# Patient Record
Sex: MALE | Race: WHITE | Employment: FULL TIME | URBAN - METROPOLITAN AREA
[De-identification: names, ages, dates, MRNs, and addresses within clinical notes are randomized per-mention and may not be internally consistent; named-entity substitution may affect disease eponyms.]

---

## 2017-02-06 ENCOUNTER — OFFICE VISIT (OUTPATIENT)
Dept: ORTHOPEDIC SURGERY | Age: 67
End: 2017-02-06

## 2017-02-06 ENCOUNTER — HOSPITAL ENCOUNTER (OUTPATIENT)
Dept: GENERAL RADIOLOGY | Facility: MEDICAL CENTER | Age: 67
Discharge: HOME | End: 2017-02-06
Attending: ORTHOPAEDIC SURGERY | Admitting: ORTHOPAEDIC SURGERY

## 2017-02-06 VITALS
SYSTOLIC BLOOD PRESSURE: 125 MMHG | HEART RATE: 65 BPM | WEIGHT: 221 LBS | HEIGHT: 73 IN | DIASTOLIC BLOOD PRESSURE: 76 MMHG | BODY MASS INDEX: 29.29 KG/M2

## 2017-02-06 DIAGNOSIS — Z98.890 STATUS POST ROTATOR CUFF SURGERY: ICD-10-CM

## 2017-02-06 DIAGNOSIS — G89.29 CHRONIC RIGHT SHOULDER PAIN: ICD-10-CM

## 2017-02-06 DIAGNOSIS — M54.2 NECK PAIN: ICD-10-CM

## 2017-02-06 DIAGNOSIS — S46.811A STRAIN OF DELTOID MUSCLE, RIGHT, INITIAL ENCOUNTER: ICD-10-CM

## 2017-02-06 DIAGNOSIS — M75.21 BICEPS TENDINITIS OF RIGHT SHOULDER: ICD-10-CM

## 2017-02-06 DIAGNOSIS — M25.511 CHRONIC RIGHT SHOULDER PAIN: ICD-10-CM

## 2017-02-06 DIAGNOSIS — G89.29 CHRONIC RIGHT SHOULDER PAIN: Primary | ICD-10-CM

## 2017-02-06 DIAGNOSIS — M25.511 CHRONIC RIGHT SHOULDER PAIN: Primary | ICD-10-CM

## 2017-02-06 DIAGNOSIS — Z98.890 S/P SHOULDER SURGERY: ICD-10-CM

## 2017-02-06 DIAGNOSIS — M75.81 ROTATOR CUFF TENDINITIS, RIGHT: ICD-10-CM

## 2017-02-06 PROCEDURE — 99214 OFFICE O/P EST MOD 30 MIN: CPT | Performed by: PHYSICIAN ASSISTANT

## 2017-02-06 PROCEDURE — 73030 X-RAY EXAM OF SHOULDER: CPT | Performed by: PHYSICIAN ASSISTANT

## 2017-02-06 RX ORDER — HYDROCODONE BITARTRATE AND ACETAMINOPHEN 5; 325 MG/1; MG/1
1 TABLET ORAL EVERY 6 HOURS PRN
COMMUNITY
End: 2017-05-10 | Stop reason: HOSPADM

## 2017-02-06 RX ORDER — MONTELUKAST SODIUM 10 MG/1
10 TABLET ORAL NIGHTLY
COMMUNITY

## 2017-02-13 ENCOUNTER — OFFICE VISIT (OUTPATIENT)
Dept: ORTHOPEDIC SURGERY | Age: 67
End: 2017-02-13

## 2017-02-13 ENCOUNTER — TELEPHONE (OUTPATIENT)
Dept: ORTHOPEDIC SURGERY | Age: 67
End: 2017-02-13

## 2017-02-13 VITALS
WEIGHT: 221 LBS | BODY MASS INDEX: 29.29 KG/M2 | HEART RATE: 70 BPM | HEIGHT: 73 IN | SYSTOLIC BLOOD PRESSURE: 121 MMHG | DIASTOLIC BLOOD PRESSURE: 72 MMHG

## 2017-02-13 DIAGNOSIS — M75.81 TENDINITIS OF RIGHT ROTATOR CUFF: ICD-10-CM

## 2017-02-13 DIAGNOSIS — Z98.890 STATUS POST SURGERY: ICD-10-CM

## 2017-02-13 DIAGNOSIS — M75.21 BICEPS TENDINITIS OF RIGHT SHOULDER: ICD-10-CM

## 2017-02-13 DIAGNOSIS — Z98.890 S/P SHOULDER SURGERY: ICD-10-CM

## 2017-02-13 DIAGNOSIS — Z98.890 STATUS POST ROTATOR CUFF SURGERY: ICD-10-CM

## 2017-02-13 DIAGNOSIS — S46.811A STRAIN OF RIGHT DELTOID MUSCLE, INITIAL ENCOUNTER: ICD-10-CM

## 2017-02-13 DIAGNOSIS — G89.29 CHRONIC RIGHT SHOULDER PAIN: Primary | ICD-10-CM

## 2017-02-13 DIAGNOSIS — M75.121 COMPLETE TEAR OF RIGHT ROTATOR CUFF: ICD-10-CM

## 2017-02-13 DIAGNOSIS — M25.511 CHRONIC RIGHT SHOULDER PAIN: Primary | ICD-10-CM

## 2017-02-13 PROBLEM — M75.80 ROTATOR CUFF TENDINITIS: Status: ACTIVE | Noted: 2017-02-13

## 2017-02-13 PROBLEM — S46.819A STRAIN OF DELTOID MUSCLE: Status: ACTIVE | Noted: 2017-02-13

## 2017-02-13 PROCEDURE — 99214 OFFICE O/P EST MOD 30 MIN: CPT | Performed by: ORTHOPAEDIC SURGERY

## 2017-02-13 RX ORDER — MELOXICAM 15 MG/1
TABLET ORAL
COMMUNITY
Start: 2017-02-13

## 2017-03-28 ENCOUNTER — TELEPHONE (OUTPATIENT)
Dept: ORTHOPEDIC SURGERY | Age: 67
End: 2017-03-28

## 2017-05-01 ENCOUNTER — TELEPHONE (OUTPATIENT)
Dept: ORTHOPEDIC SURGERY | Age: 67
End: 2017-05-01

## 2017-05-03 ENCOUNTER — TELEPHONE (OUTPATIENT)
Dept: ORTHOPEDIC SURGERY | Age: 67
End: 2017-05-03

## 2017-05-04 ENCOUNTER — HOSPITAL ENCOUNTER (OUTPATIENT)
Dept: PREADMISSION TESTING | Age: 67
Discharge: HOME | End: 2017-05-04
Attending: ORTHOPAEDIC SURGERY | Admitting: ORTHOPAEDIC SURGERY

## 2017-05-04 VITALS — BODY MASS INDEX: 29.16 KG/M2 | WEIGHT: 220 LBS | HEIGHT: 73 IN

## 2017-05-04 RX ORDER — CHLORHEXIDINE GLUCONATE 0.12 MG/ML
15 RINSE ORAL 2 TIMES DAILY
Status: CANCELLED | OUTPATIENT
Start: 2017-05-04

## 2017-05-10 ENCOUNTER — HOSPITAL ENCOUNTER (OUTPATIENT)
Dept: SURGERY | Age: 67
Discharge: HOME | End: 2017-05-10
Admitting: ORTHOPAEDIC SURGERY

## 2017-05-10 VITALS
HEIGHT: 73 IN | OXYGEN SATURATION: 94 % | RESPIRATION RATE: 18 BRPM | WEIGHT: 220 LBS | HEART RATE: 61 BPM | BODY MASS INDEX: 29.16 KG/M2 | TEMPERATURE: 97.9 F | SYSTOLIC BLOOD PRESSURE: 103 MMHG | DIASTOLIC BLOOD PRESSURE: 54 MMHG

## 2017-05-10 PROCEDURE — 93010 ELECTROCARDIOGRAM REPORT: CPT | Performed by: INTERNAL MEDICINE

## 2017-05-10 RX ORDER — ONDANSETRON 2 MG/ML
4 INJECTION INTRAMUSCULAR; INTRAVENOUS
Status: ACTIVE | OUTPATIENT
Start: 2017-05-10 | End: 2017-05-10

## 2017-05-10 RX ORDER — SODIUM CHLORIDE, SODIUM LACTATE, POTASSIUM CHLORIDE, CALCIUM CHLORIDE 600; 310; 30; 20 MG/100ML; MG/100ML; MG/100ML; MG/100ML
INJECTION, SOLUTION INTRAVENOUS CONTINUOUS
Status: DISCONTINUED | OUTPATIENT
Start: 2017-05-10 | End: 2017-05-11 | Stop reason: HOSPADM

## 2017-05-10 RX ORDER — ROPIVACAINE HYDROCHLORIDE 5 MG/ML
30 INJECTION, SOLUTION EPIDURAL; INFILTRATION; PERINEURAL ONCE
Status: DISCONTINUED | OUTPATIENT
Start: 2017-05-10 | End: 2017-05-11 | Stop reason: HOSPADM

## 2017-05-10 RX ORDER — MORPHINE SULFATE 2 MG/ML
1 INJECTION, SOLUTION INTRAMUSCULAR; INTRAVENOUS EVERY 5 MIN PRN
Status: DISCONTINUED | OUTPATIENT
Start: 2017-05-10 | End: 2017-05-11 | Stop reason: HOSPADM

## 2017-05-10 RX ORDER — PROMETHAZINE HYDROCHLORIDE 25 MG/1
25 TABLET ORAL EVERY 8 HOURS PRN
Qty: 30 TABLET | Refills: 0 | Status: SHIPPED | OUTPATIENT
Start: 2017-05-10 | End: 2017-05-17

## 2017-05-10 RX ORDER — CHLORHEXIDINE GLUCONATE 0.12 MG/ML
15 RINSE ORAL 2 TIMES DAILY
Status: DISCONTINUED | OUTPATIENT
Start: 2017-05-10 | End: 2017-05-11 | Stop reason: HOSPADM

## 2017-05-10 RX ORDER — LABETALOL HYDROCHLORIDE 5 MG/ML
5 INJECTION, SOLUTION INTRAVENOUS EVERY 10 MIN PRN
Status: DISCONTINUED | OUTPATIENT
Start: 2017-05-10 | End: 2017-05-11 | Stop reason: HOSPADM

## 2017-05-10 RX ORDER — LIDOCAINE HYDROCHLORIDE 10 MG/ML
INJECTION, SOLUTION INFILTRATION; PERINEURAL
Status: DISCONTINUED
Start: 2017-05-10 | End: 2017-05-10 | Stop reason: WASHOUT

## 2017-05-10 RX ORDER — SODIUM CHLORIDE 0.9 % (FLUSH) 0.9 %
10 SYRINGE (ML) INJECTION PRN
Status: DISCONTINUED | OUTPATIENT
Start: 2017-05-10 | End: 2017-05-11 | Stop reason: HOSPADM

## 2017-05-10 RX ORDER — SODIUM CHLORIDE 0.9 % (FLUSH) 0.9 %
10 SYRINGE (ML) INJECTION EVERY 12 HOURS SCHEDULED
Status: DISCONTINUED | OUTPATIENT
Start: 2017-05-10 | End: 2017-05-11 | Stop reason: HOSPADM

## 2017-05-10 RX ORDER — MIDAZOLAM HYDROCHLORIDE 1 MG/ML
4 INJECTION INTRAMUSCULAR; INTRAVENOUS
Status: COMPLETED | OUTPATIENT
Start: 2017-05-10 | End: 2017-05-10

## 2017-05-10 RX ORDER — OXYCODONE HYDROCHLORIDE AND ACETAMINOPHEN 5; 325 MG/1; MG/1
2 TABLET ORAL PRN
Status: ACTIVE | OUTPATIENT
Start: 2017-05-10 | End: 2017-05-10

## 2017-05-10 RX ORDER — FENTANYL CITRATE 50 UG/ML
100 INJECTION, SOLUTION INTRAMUSCULAR; INTRAVENOUS ONCE
Status: DISCONTINUED | OUTPATIENT
Start: 2017-05-10 | End: 2017-05-11 | Stop reason: HOSPADM

## 2017-05-10 RX ORDER — HYDRALAZINE HYDROCHLORIDE 20 MG/ML
5 INJECTION INTRAMUSCULAR; INTRAVENOUS
Status: DISCONTINUED | OUTPATIENT
Start: 2017-05-10 | End: 2017-05-11 | Stop reason: HOSPADM

## 2017-05-10 RX ORDER — OXYCODONE HYDROCHLORIDE AND ACETAMINOPHEN 5; 325 MG/1; MG/1
1 TABLET ORAL EVERY 4 HOURS PRN
Qty: 30 TABLET | Refills: 0 | Status: SHIPPED | OUTPATIENT
Start: 2017-05-10 | End: 2017-05-17

## 2017-05-10 RX ORDER — MEPERIDINE HYDROCHLORIDE 25 MG/ML
12.5 INJECTION INTRAMUSCULAR; INTRAVENOUS; SUBCUTANEOUS EVERY 5 MIN PRN
Status: DISCONTINUED | OUTPATIENT
Start: 2017-05-10 | End: 2017-05-11 | Stop reason: HOSPADM

## 2017-05-10 RX ORDER — GLYCOPYRROLATE 0.2 MG/ML
0.1 INJECTION INTRAMUSCULAR; INTRAVENOUS ONCE
Status: COMPLETED | OUTPATIENT
Start: 2017-05-10 | End: 2017-05-10

## 2017-05-10 RX ORDER — OXYCODONE HYDROCHLORIDE AND ACETAMINOPHEN 5; 325 MG/1; MG/1
1 TABLET ORAL PRN
Status: ACTIVE | OUTPATIENT
Start: 2017-05-10 | End: 2017-05-10

## 2017-05-10 RX ORDER — MORPHINE SULFATE 2 MG/ML
2 INJECTION, SOLUTION INTRAMUSCULAR; INTRAVENOUS EVERY 5 MIN PRN
Status: DISCONTINUED | OUTPATIENT
Start: 2017-05-10 | End: 2017-05-11 | Stop reason: HOSPADM

## 2017-05-10 RX ORDER — LIDOCAINE HYDROCHLORIDE 10 MG/ML
INJECTION, SOLUTION INFILTRATION; PERINEURAL
Status: DISPENSED
Start: 2017-05-10 | End: 2017-05-10

## 2017-05-10 RX ADMIN — CHLORHEXIDINE GLUCONATE 15 ML: 0.12 RINSE ORAL at 13:39

## 2017-05-10 RX ADMIN — CHLORHEXIDINE GLUCONATE 15 ML: 0.12 RINSE ORAL at 08:44

## 2017-05-10 RX ADMIN — MIDAZOLAM HYDROCHLORIDE 2 MG: 1 INJECTION INTRAMUSCULAR; INTRAVENOUS at 09:20

## 2017-05-10 RX ADMIN — GLYCOPYRROLATE 0.1 MG: 0.2 INJECTION INTRAMUSCULAR; INTRAVENOUS at 09:46

## 2017-05-10 RX ADMIN — SODIUM CHLORIDE, SODIUM LACTATE, POTASSIUM CHLORIDE, CALCIUM CHLORIDE: 600; 310; 30; 20 INJECTION, SOLUTION INTRAVENOUS at 08:53

## 2017-05-10 ASSESSMENT — PAIN DESCRIPTION - PAIN TYPE: TYPE: SURGICAL PAIN

## 2017-05-10 ASSESSMENT — ENCOUNTER SYMPTOMS: SHORTNESS OF BREATH: 1

## 2017-05-10 ASSESSMENT — PAIN SCALES - GENERAL
PAINLEVEL_OUTOF10: 0

## 2017-05-10 ASSESSMENT — PAIN - FUNCTIONAL ASSESSMENT: PAIN_FUNCTIONAL_ASSESSMENT: 0-10

## 2017-05-10 ASSESSMENT — PAIN DESCRIPTION - ORIENTATION: ORIENTATION: RIGHT

## 2017-05-10 ASSESSMENT — PAIN DESCRIPTION - LOCATION: LOCATION: SHOULDER

## 2017-05-11 ENCOUNTER — HOSPITAL ENCOUNTER (OUTPATIENT)
Dept: PHYSICAL THERAPY | Age: 67
Discharge: HOME | End: 2017-05-31
Admitting: ORTHOPAEDIC SURGERY

## 2017-05-11 LAB
EKG ATRIAL RATE: 54 BPM
EKG DIAGNOSIS: NORMAL
EKG P AXIS: 36 DEGREES
EKG P-R INTERVAL: 110 MS
EKG Q-T INTERVAL: 462 MS
EKG QRS DURATION: 94 MS
EKG QTC CALCULATION (BAZETT): 438 MS
EKG R AXIS: 19 DEGREES
EKG T AXIS: 17 DEGREES
EKG VENTRICULAR RATE: 54 BPM

## 2017-05-16 DIAGNOSIS — Z98.890 S/P ROTATOR CUFF REPAIR: ICD-10-CM

## 2017-05-19 ENCOUNTER — OFFICE VISIT (OUTPATIENT)
Dept: ORTHOPEDIC SURGERY | Age: 67
End: 2017-05-19

## 2017-05-19 VITALS
HEART RATE: 68 BPM | HEIGHT: 72 IN | DIASTOLIC BLOOD PRESSURE: 72 MMHG | WEIGHT: 220.02 LBS | BODY MASS INDEX: 29.8 KG/M2 | SYSTOLIC BLOOD PRESSURE: 136 MMHG

## 2017-05-19 DIAGNOSIS — Z98.890 S/P ROTATOR CUFF REPAIR: Primary | ICD-10-CM

## 2017-05-19 PROCEDURE — 99024 POSTOP FOLLOW-UP VISIT: CPT | Performed by: ORTHOPAEDIC SURGERY

## 2017-07-14 ENCOUNTER — OFFICE VISIT (OUTPATIENT)
Dept: ORTHOPEDIC SURGERY | Age: 67
End: 2017-07-14

## 2017-07-14 VITALS
SYSTOLIC BLOOD PRESSURE: 129 MMHG | HEART RATE: 69 BPM | WEIGHT: 220.02 LBS | DIASTOLIC BLOOD PRESSURE: 70 MMHG | HEIGHT: 72 IN | BODY MASS INDEX: 29.8 KG/M2

## 2017-07-14 DIAGNOSIS — Z98.890 S/P ROTATOR CUFF REPAIR: Primary | ICD-10-CM

## 2017-07-14 PROCEDURE — 99024 POSTOP FOLLOW-UP VISIT: CPT | Performed by: PHYSICIAN ASSISTANT

## 2017-09-13 ENCOUNTER — HOSPITAL ENCOUNTER (OUTPATIENT)
Dept: PHYSICAL THERAPY | Age: 67
Discharge: HOME | End: 2017-09-14
Admitting: ORTHOPAEDIC SURGERY

## 2017-09-18 ENCOUNTER — HOSPITAL ENCOUNTER (OUTPATIENT)
Dept: PHYSICAL THERAPY | Age: 67
Discharge: HOME | End: 2017-09-19
Admitting: ORTHOPAEDIC SURGERY

## 2017-09-22 ENCOUNTER — OFFICE VISIT (OUTPATIENT)
Dept: ORTHOPEDIC SURGERY | Age: 67
End: 2017-09-22

## 2017-09-22 VITALS
HEIGHT: 72 IN | SYSTOLIC BLOOD PRESSURE: 112 MMHG | HEART RATE: 65 BPM | BODY MASS INDEX: 29.8 KG/M2 | DIASTOLIC BLOOD PRESSURE: 68 MMHG | WEIGHT: 220 LBS

## 2017-09-22 DIAGNOSIS — Z98.890 S/P ROTATOR CUFF REPAIR: ICD-10-CM

## 2017-09-22 DIAGNOSIS — M25.511 CHRONIC RIGHT SHOULDER PAIN: Primary | ICD-10-CM

## 2017-09-22 DIAGNOSIS — G89.29 CHRONIC RIGHT SHOULDER PAIN: Primary | ICD-10-CM

## 2017-09-22 PROCEDURE — 99213 OFFICE O/P EST LOW 20 MIN: CPT | Performed by: PHYSICIAN ASSISTANT

## 2018-05-22 ENCOUNTER — HOSPITAL ENCOUNTER (OUTPATIENT)
Dept: OTHER | Age: 68
Discharge: HOME | End: 2018-05-31
Attending: INTERNAL MEDICINE | Admitting: INTERNAL MEDICINE

## 2018-06-01 ENCOUNTER — HOSPITAL ENCOUNTER (OUTPATIENT)
Dept: PHYSICAL THERAPY | Age: 68
Discharge: HOME | End: 2018-06-02
Admitting: INTERNAL MEDICINE

## 2018-06-01 ENCOUNTER — HOSPITAL ENCOUNTER (OUTPATIENT)
Dept: OTHER | Age: 68
Discharge: HOME | End: 2018-06-30
Attending: INTERNAL MEDICINE | Admitting: INTERNAL MEDICINE

## 2018-06-04 ENCOUNTER — HOSPITAL ENCOUNTER (OUTPATIENT)
Dept: PHYSICAL THERAPY | Age: 68
Discharge: HOME | End: 2018-06-05
Admitting: INTERNAL MEDICINE

## 2018-06-04 NOTE — FLOWSHEET NOTE
Physical Therapy Daily Treatment Note  Date:  2018    Patient Name:  China Meléndez    :  1950  MRN: [id number]  Restrictions/Precautions:  B quad weakness- R 2/5 and L 3/5. 18 had lumbar surgery to attempt to straighten back by connecting prior S1 - L1 fusion to T8- 13 hour surgery. Pt seems to have nerve palsy from laying prone which has caused B quad weakness. MD said she regain strength quickly    Pertinent Medical History: OA, HTN, Restless Leg, SOB, Back Surgery- multiple fusions prior to current one. Cervical Fusion. L TKR. L shoulder surgery. R shoulder surgery    Medical/Treatment Diagnosis Information:  · Diagnosis: B LE weakness  · Treatment Diagnosis: B LE weakness, impaired standing balance and ability to ambulate    Insurance/Certification information:  PT Insurance Information: [employer]. Physician Information:  Referring Practitioner: Dr. Smyth Square of care signed (Y/N):    Visit# / total visits:    Pain level: 0-1/10     G-Code (if applicable):      Date / Visit # G-Code Applied:  18  PT G-Codes  Functional Assessment Tool Used: PT assessment  Functional Limitation: Mobility: Walking and moving around  Mobility: Walking and Moving Around Current Status (): At least 80 percent but less than 100 percent impaired, limited or restricted  Mobility: Walking and Moving Around Goal Status (): At least 1 percent but less than 20 percent impaired, limited or restricted    Progress Note: []  Yes  []  No  Next due by: Visit #10      History of Injury:  Pt states he had lumbar surgery on 18 and 18 to do S1 disectomy and then to correct S1 to L5 fusion by connecting it to T8 to improve posture. Pt was having left leg pain from his knee down that was really bad, leading to the surgery. Currently, 0-1/10 pain in low back, nothing in LE. Pt states he was discharged from [facility] on 18. At that time he was doing transfers independently.  Last day at [facility] he ambulated 50 feet with RW and CGA from PT. Pt states his left leg has been getting stronger, left leg is still weak. Subjective:       5/24/18: States he is doing good   6/1/18: Pt states he is doing good  6/4/18: Pt states he has noticed he can move his R foot a little more when he tries to kick his leg out    Objective:  Observation:   Test measurements:    6/1/18 Knee Extension MMT: R 2/5, L 3+/5    Exercises:  Exercise/Equipment Resistance/Repetitions Other comments   // Bars Stood x 10 min, x 5 min with CGA. Nu Step X 10 min, level 4                                  HEP      Supine Quad and Glut set, bridging  5/22/18   Seated HS STretch  5/22/18                    Other Therapeutic Activities:      Home Exercise Program:    5/24/18: Practiced sit to stand at / bar to mimic standing at home at sink.  Pt to start standing at home with chair behind him and 1 person assist    Manual Treatments:    STM To B quads    Modalities:    Estim (attended): VMS to R quad x 10 min: 10 sec on/ 10 sec off - did quad contraction when on    Timed Code Treatment Minutes:  70    Total Treatment Minutes:  70    Treatment/Activity Tolerance:  [x] Patient tolerated treatment well [] Patient limited by fatigue  [] Patient limited by pain  [] Patient limited by other medical complications  [] Other:     Prognosis: [x] Good [] Fair  [] Poor    Patient Requires Follow-up: [x] Yes  [] No    Plan:   [x] Continue per plan of care [] Alter current plan (see comments)  [] Plan of care initiated [] Hold pending MD visit [] Discharge    Plan for Next Session:    Progress standing tolerance and gait in // bars  Work on quad strengthening with Estim if helping    Electronically signed by:  Silvia Khan, PT

## 2018-06-06 ENCOUNTER — HOSPITAL ENCOUNTER (OUTPATIENT)
Dept: PHYSICAL THERAPY | Age: 68
Discharge: HOME | End: 2018-06-07
Admitting: INTERNAL MEDICINE

## 2018-06-06 NOTE — FLOWSHEET NOTE
Physical Therapy Daily Treatment Note  Date:  2018    Patient Name:  Makenzie Hidalgo    :  1950  MRN: [id number]  Restrictions/Precautions:  B quad weakness- R 2/5 and L 3/5. 18 had lumbar surgery to attempt to straighten back by connecting prior S1 - L1 fusion to T8- 13 hour surgery. Pt seems to have nerve palsy from laying prone which has caused B quad weakness. MD said she regain strength quickly    Pertinent Medical History: OA, HTN, Restless Leg, SOB, Back Surgery- multiple fusions prior to current one. Cervical Fusion. L TKR. L shoulder surgery. R shoulder surgery    Medical/Treatment Diagnosis Information:  · Diagnosis: B LE weakness  · Treatment Diagnosis: B LE weakness, impaired standing balance and ability to ambulate    Insurance/Certification information:  PT Insurance Information[facility] Physician Information:  Referring Practitioner: Dr. Orestes Rodriguez of care signed (Y/N):    Visit# / total visits:     Pain level: 0-1/10     G-Code (if applicable):      Date / Visit # G-Code Applied:  18  PT G-Codes  Functional Assessment Tool Used: PT assessment  Functional Limitation: Mobility: Walking and moving around  Mobility: Walking and Moving Around Current Status (): At least 80 percent but less than 100 percent impaired, limited or restricted  Mobility: Walking and Moving Around Goal Status (): At least 1 percent but less than 20 percent impaired, limited or restricted    Progress Note: []  Yes  []  No  Next due by: Visit #10      History of Injury:  Pt states he had lumbar surgery on 18 and 18 to do S1 disectomy and then to correct S1 to L5 fusion by connecting it to T8 to improve posture. Pt was having left leg pain from his knee down that was really bad, leading to the surgery. Currently, 0-1/10 pain in low back, nothing in LE. Pt states he was discharged from [facility] on 18. At that time he was doing transfers independently.  Last

## 2018-06-11 ENCOUNTER — HOSPITAL ENCOUNTER (OUTPATIENT)
Dept: PHYSICAL THERAPY | Age: 68
Discharge: HOME | End: 2018-06-12
Admitting: INTERNAL MEDICINE

## 2018-06-11 NOTE — FLOWSHEET NOTE
Physical Therapy Daily Treatment Note  Date:  2018    Patient Name:  Tom Lees    :  1950  MRN: [id number]  Restrictions/Precautions:  B quad weakness- R 2/5 and L 3/5. 18 had lumbar surgery to attempt to straighten back by connecting prior S1 - L1 fusion to T8- 13 hour surgery. Pt seems to have nerve palsy from laying prone which has caused B quad weakness. MD said she regain strength quickly    Pertinent Medical History: OA, HTN, Restless Leg, SOB, Back Surgery- multiple fusions prior to current one. Cervical Fusion. L TKR. L shoulder surgery. R shoulder surgery    Medical/Treatment Diagnosis Information:  · Diagnosis: B LE weakness  · Treatment Diagnosis: B LE weakness, impaired standing balance and ability to ambulate    Insurance/Certification information:  PT Insurance Information: [email or web address]. Physician Information:  Referring Practitioner: Dr. Esdras Bhat of care signed (Y/N):    Visit# / total visits:     Pain level: 0-1/10     G-Code (if applicable):      Date / Visit # G-Code Applied:  18  PT G-Codes  Functional Assessment Tool Used: PT assessment  Functional Limitation: Mobility: Walking and moving around  Mobility: Walking and Moving Around Current Status (): At least 80 percent but less than 100 percent impaired, limited or restricted  Mobility: Walking and Moving Around Goal Status (): At least 1 percent but less than 20 percent impaired, limited or restricted    Progress Note: []  Yes  []  No  Next due by: Visit #10      History of Injury:  Pt states he had lumbar surgery on 18 and 18 to do S1 disectomy and then to correct S1 to L5 fusion by connecting it to T8 to improve posture. Pt was having left leg pain from his knee down that was really bad, leading to the surgery. Currently, 0-1/10 pain in low back, nothing in LE. Pt states he was discharged from [facility] on 18. At that time he was doing transfers independently.  Last day at [facility] he ambulated 50 feet with RW and CGA from PT. Pt states his left leg has been getting stronger, left leg is still weak. Subjective:       5/24/18: States he is doing good   6/1/18: Pt states he is doing good  6/4/18: Pt states he has noticed he can move his R foot a little more when he tries to kick his leg out  6/6/18: Pt states he is doing good  6/11/18: States his R foot is kicking out a little more    Objective:  Observation:   Test measurements:    6/1/18 Knee Extension MMT: R 2/5, L 3+/5    Exercises:  Exercise/Equipment Resistance/Repetitions Other comments   // Bars Stood x 10 min, x 5 min with CGA. Nu Step X 10 min, level 3    Seated LAQ X 10 B with assist- did eccentric on left                             HEP      Supine Quad and Glut set, bridging  5/22/18   Seated HS STretch  5/22/18                    Other Therapeutic Activities:      Home Exercise Program:    5/24/18: Practiced sit to stand at / bar to mimic standing at home at [facility].  Pt to start standing at home with chair behind him and 1 person assist    Manual Treatments:        Modalities:    Estim (attended): VMS to R quad x 10 min: 10 sec on/ 10 sec off - did quad contraction when on    Timed Code Treatment Minutes:  57    Total Treatment Minutes:  65    Treatment/Activity Tolerance:  [x] Patient tolerated treatment well [] Patient limited by fatigue  [] Patient limited by pain  [] Patient limited by other medical complications  [] Other:     Prognosis: [x] Good [] Fair  [] Poor    Patient Requires Follow-up: [x] Yes  [] No    Plan:   [x] Continue per plan of care [] Alter current plan (see comments)  [] Plan of care initiated [] Hold pending MD visit [] Discharge    Plan for Next Session:    Progress standing tolerance and gait in // bars  Work on quad strengthening with Estim if helping    Electronically signed by:  Quintin Quintanilla, PT

## 2018-06-13 ENCOUNTER — HOSPITAL ENCOUNTER (OUTPATIENT)
Dept: PHYSICAL THERAPY | Age: 68
Discharge: HOME | End: 2018-06-14
Admitting: INTERNAL MEDICINE

## 2018-06-13 NOTE — FLOWSHEET NOTE
Physical Therapy Daily Treatment Note  Date:  2018    Patient Name:  Colton Childress    :  1950  MRN: [id number]  Restrictions/Precautions:  B quad weakness- R 2/5 and L 3/5. 18 had lumbar surgery to attempt to straighten back by connecting prior S1 - L1 fusion to T8- 13 hour surgery. Pt seems to have nerve palsy from laying prone which has caused B quad weakness. MD said she regain strength quickly    Pertinent Medical History: OA, HTN, Restless Leg, SOB, Back Surgery- multiple fusions prior to current one. Cervical Fusion. L TKR. L shoulder surgery. R shoulder surgery    Medical/Treatment Diagnosis Information:  · Diagnosis: B LE weakness  · Treatment Diagnosis: B LE weakness, impaired standing balance and ability to ambulate    Insurance/Certification information:  PT Insurance Information: [employer]. Physician Information:  Referring Practitioner: Dr. Viola Magdaleno of care signed (Y/N):    Visit# / total visits:     Pain level: 0-1/10     G-Code (if applicable):      Date / Visit # G-Code Applied:  18  PT G-Codes  Functional Assessment Tool Used: PT assessment  Functional Limitation: Mobility: Walking and moving around  Mobility: Walking and Moving Around Current Status (): At least 80 percent but less than 100 percent impaired, limited or restricted  Mobility: Walking and Moving Around Goal Status (): At least 1 percent but less than 20 percent impaired, limited or restricted    Progress Note: []  Yes  []  No  Next due by: Visit #10      History of Injury:  Pt states he had lumbar surgery on 18 and 18 to do S1 disectomy and then to correct S1 to L5 fusion by connecting it to T8 to improve posture. Pt was having left leg pain from his knee down that was really bad, leading to the surgery. Currently, 0-1/10 pain in low back, nothing in LE. Pt states he was discharged from [facility] on 18. At that time he was doing transfers independently.  Last day at [facility] he ambulated 50 feet with RW and CGA from PT. Pt states his left leg has been getting stronger, left leg is still weak. Subjective:       5/24/18: States he is doing good   6/1/18: Pt states he is doing good  6/4/18: Pt states he has noticed he can move his R foot a little more when he tries to kick his leg out  6/6/18: Pt states he is doing good  6/11/18: States his R foot is kicking out a little more  06/13/18 Patient reports he is able to stand at kitchen sink a little longer. Objective:  Observation:   Test measurements:    6/1/18 Knee Extension MMT: R 2/5, L 3+/5    Exercises:  Exercise/Equipment Resistance/Repetitions Other comments   // Bars Stood x 10 min, x 5 min with CGA. Nu Step X 10 min, level 3    Seated LAQ X 10 B with assist- did eccentric on left                             HEP      Supine Quad and Glut set, bridging  5/22/18   Seated HS STretch  5/22/18                    Other Therapeutic Activities:      Home Exercise Program:    5/24/18: Practiced sit to stand at / bar to mimic standing at home at sink.  Pt to start standing at home with chair behind him and 1 person assist    Manual Treatments:        Modalities:    Estim (attended): VMS to R quad x 10 min: 10 sec on/ 10 sec off - did quad contraction when on    Timed Code Treatment Minutes:  57    Total Treatment Minutes:  65    Treatment/Activity Tolerance:  [x] Patient tolerated treatment well [] Patient limited by fatigue  [] Patient limited by pain  [] Patient limited by other medical complications  [] Other:     Prognosis: [x] Good [] Fair  [] Poor    Patient Requires Follow-up: [x] Yes  [] No    Plan:   [x] Continue per plan of care [] Alter current plan (see comments)  [] Plan of care initiated [] Hold pending MD visit [] Discharge    Plan for Next Session:    Progress standing tolerance and gait in // bars  Work on quad strengthening with Estim if helping    Electronically signed by:  Bao Swanson PTA

## 2018-06-17 NOTE — FLOWSHEET NOTE
Physical Therapy Daily Treatment Note  Date:  2018    Patient Name:  Amanda Francis    :  1950  MRN: [id number]  Restrictions/Precautions:  B quad weakness- R 2/5 and L 3/5. 18 had lumbar surgery to attempt to straighten back by connecting prior S1 - L1 fusion to T8- 13 hour surgery. Pt seems to have nerve palsy from laying prone which has caused B quad weakness. MD said she regain strength quickly    Pertinent Medical History: OA, HTN, Restless Leg, SOB, Back Surgery- multiple fusions prior to current one. Cervical Fusion. L TKR. L shoulder surgery. R shoulder surgery    Medical/Treatment Diagnosis Information:  · Diagnosis: B LE weakness  · Treatment Diagnosis: B LE weakness, impaired standing balance and ability to ambulate    Insurance/Certification information:  PT Insurance Information: [facility]. Physician Information:  Referring Practitioner: Dr. Renita Vargas of care signed (Y/N):    Visit# / total visits:  3/12  Pain level: 0-1/10     G-Code (if applicable):      Date / Visit # G-Code Applied:  18  PT G-Codes  Functional Assessment Tool Used: PT assessment  Functional Limitation: Mobility: Walking and moving around  Mobility: Walking and Moving Around Current Status (): At least 80 percent but less than 100 percent impaired, limited or restricted  Mobility: Walking and Moving Around Goal Status (): At least 1 percent but less than 20 percent impaired, limited or restricted    Progress Note: []  Yes  []  No  Next due by: Visit #10      History of Injury:  Pt states he had lumbar surgery on 18 and 18 to do S1 disectomy and then to correct S1 to L5 fusion by connecting it to T8 to improve posture. Pt was having left leg pain from his knee down that was really bad, leading to the surgery. Currently, 0-1/10 pain in low back, nothing in LE. Pt states he was discharged from [facility] on 18. At that time he was doing transfers independently.  Last day 18-Jun-2018 01:44

## 2018-06-25 ENCOUNTER — HOSPITAL ENCOUNTER (OUTPATIENT)
Dept: PHYSICAL THERAPY | Age: 68
Discharge: HOME | End: 2018-06-26
Admitting: INTERNAL MEDICINE

## 2018-06-27 ENCOUNTER — HOSPITAL ENCOUNTER (OUTPATIENT)
Dept: PHYSICAL THERAPY | Age: 68
Discharge: HOME | End: 2018-06-28
Admitting: INTERNAL MEDICINE

## 2018-07-01 ENCOUNTER — HOSPITAL ENCOUNTER (OUTPATIENT)
Dept: OTHER | Age: 68
Discharge: HOME | End: 2018-07-31
Attending: INTERNAL MEDICINE | Admitting: INTERNAL MEDICINE

## 2018-07-05 ENCOUNTER — HOSPITAL ENCOUNTER (OUTPATIENT)
Dept: PHYSICAL THERAPY | Age: 68
Discharge: HOME | End: 2018-07-06
Admitting: INTERNAL MEDICINE

## 2018-07-11 ENCOUNTER — HOSPITAL ENCOUNTER (OUTPATIENT)
Dept: PHYSICAL THERAPY | Age: 68
Discharge: HOME | End: 2018-07-12
Admitting: INTERNAL MEDICINE

## 2018-07-11 NOTE — FLOWSHEET NOTE
Last day at [facility] he ambulated 50 feet with RW and CGA from PT. Pt states his left leg has been getting stronger, left leg is still weak. Subjective:     6/11/18: States his R foot is kicking out a little more  06/13/18 Patient reports he is able to stand at kitchen sink a little longer. 6/18/18: Pt states he is doing good. R leg is moving further out when he kicks- glad it is progressing like the left one did  6/20/18: Pt states he stood for 12 minutes at home at the sink and was reaching around some to do dishes. Felt steady, no instances of knees buckling on him. 6/25/18: Things are continuing to progress  6/27/18: Pt states things are going good  7/2/18: States his right knee was stiff today  7/9/18: Pt reports both knees are stiff today  7/11/18: Pt states he had the EMG done yesterday but is not sure of the results- talks with Dr. Davian Oliver in two weeks      Objective:  Observation:   Test measurements:    6/1/18 Knee Extension MMT: R 2/5, L 3+/5    Exercises:  Exercise/Equipment Resistance/Repetitions Other comments   // Bars Stood x 5 min, x 5 min with CGA. Nu Step X 10 min, level 4    Seated LAQ X 10 B with assist- did eccentric on left, 3# on right                             HEP      Supine Quad and Glut set, bridging  5/22/18   Seated HS STretch  5/22/18                    Other Therapeutic Activities:      Home Exercise Program:    5/24/18: Practiced sit to stand at / bar to mimic standing at home at sink. Pt to start standing at home with chair behind him and 1 person assist    Manual Treatments:        Modalities:    Estim (attended): VMS to R quad x 10 min: 10 sec on/ 10 sec off - did quad contraction when on .      Timed Code Treatment Minutes:  41    Total Treatment Minutes:  45    Treatment/Activity Tolerance:  [x] Patient tolerated treatment well [] Patient limited by fatigue  [] Patient limited by pain  [] Patient limited by other medical complications  [] Other:     Prognosis: [x]

## 2018-07-12 ENCOUNTER — HOSPITAL ENCOUNTER (OUTPATIENT)
Dept: PHYSICAL THERAPY | Age: 68
Discharge: HOME | End: 2018-07-13
Admitting: INTERNAL MEDICINE

## 2018-07-16 ENCOUNTER — HOSPITAL ENCOUNTER (OUTPATIENT)
Dept: PHYSICAL THERAPY | Age: 68
Discharge: HOME | End: 2018-07-17
Admitting: INTERNAL MEDICINE

## 2018-07-16 NOTE — FLOWSHEET NOTE
Physical Therapy Daily Treatment Note  Date:  2018    Patient Name:  Lyndsay Rodriguez    :  1950  MRN: [id number]     Restrictions/Precautions:  B quad weakness- R 2/5 and L 3/5. 18 had lumbar surgery to attempt to straighten back by connecting prior S1 - L1 fusion to T8- 13 hour surgery. Pt seems to have nerve palsy from laying prone which has caused B quad weakness. MD said she regain strength quickly  Pertinent Medical History: OA, HTN, Restless Leg, SOB, Back Surgery- multiple fusions prior to current one. Cervical Fusion. L TKR. L shoulder surgery. R shoulder surgery    Medical/Treatment Diagnosis Information:  · Diagnosis: B LE weakness  · Treatment Diagnosis: B LE weakness, impaired standing balance and ability to ambulate    Insurance/Certification information:  PT Insurance Information: [employer]. Physician Information:  Referring Practitioner: Dr. Kristin Cook of care signed (Y/N):    Visit# / total visits:     Pain level: 0-1/10     G-Code (if applicable):      Date / Visit # G-Code Applied:  18  PT G-Codes  Functional Assessment Tool Used: PT assessment  Functional Limitation: Mobility: Walking and moving around  Mobility: Walking and Moving Around Current Status (): At least 80 percent but less than 100 percent impaired, limited or restricted  Mobility: Walking and Moving Around Goal Status (): At least 1 percent but less than 20 percent impaired, limited or restricted    Progress Note: [x]  Yes  []  No  Next due by: Visit #10      History of Injury:  Pt states he had lumbar surgery on 18 and 18 to do S1 disectomy and then to correct S1 to L5 fusion by connecting it to T8 to improve posture. Pt was having left leg pain from his knee down that was really bad, leading to the surgery. Currently, 0-1/10 pain in low back, nothing in LE. Pt states he was discharged from [facility] on 18. At that time he was doing transfers independently. Last day at [facility] he ambulated 50 feet with RW and CGA from PT. Pt states his left leg has been getting stronger, left leg is still weak. Subjective:     6/11/18: States his R foot is kicking out a little more  06/13/18 Patient reports he is able to stand at kitchen sink a little longer. 6/18/18: Pt states he is doing good. R leg is moving further out when he kicks- glad it is progressing like the left one did  6/20/18: Pt states he stood for 12 minutes at home at the sink and was reaching around some to do dishes. Felt steady, no instances of knees buckling on him. 6/25/18: Things are continuing to progress  6/27/18: Pt states things are going good  7/2/18: States his right knee was stiff today  7/9/18: Pt reports both knees are stiff today  7/11/18: Pt states he had the EMG done yesterday but is not sure of the results- talks with Dr. Kylie Escalante in two weeks  7/16/18: Pt states he is continuing to improve. Pool therapy went well      Objective:  Observation:   Test measurements:    6/1/18 Knee Extension MMT: R 2/5, L 3+/5    Exercises:  Exercise/Equipment Resistance/Repetitions Other comments   // Bars Stood x 5 min, x 5 min with CGA. Nu Step X 10 min, level 4    Seated LAQ X 10 B with assist- did eccentric on left, 4# on right                             HEP      Supine Quad and Glut set, bridging  5/22/18   Seated HS STretch  5/22/18                    Other Therapeutic Activities:      Home Exercise Program:    5/24/18: Practiced sit to stand at / bar to mimic standing at home at sink. Pt to start standing at home with chair behind him and 1 person assist    Manual Treatments:        Modalities:    Estim (attended): VMS to R quad x 10 min: 10 sec on/ 10 sec off - did quad contraction when on .      Timed Code Treatment Minutes:  57    Total Treatment Minutes:  62    Treatment/Activity Tolerance:  [x] Patient tolerated treatment well [] Patient limited by fatigue  [] Patient limited by pain  []

## 2018-07-17 ENCOUNTER — HOSPITAL ENCOUNTER (OUTPATIENT)
Dept: PHYSICAL THERAPY | Age: 68
Discharge: HOME | End: 2018-07-18
Admitting: INTERNAL MEDICINE

## 2018-07-17 NOTE — FLOWSHEET NOTE
Currently using a bedside commode and sponge bathing. Discussed options to safely maneuver in bathroom in order to use toilet. Patient to discuss with land PT practicing taking a few steps with use of rolling walker in order to get to toilet. Patient informed of the availability of raised toilet seats that have hand rails to assist with getting up. Patient also informed of tub benches that extend beyond the tub to allow him to shower without having to step over tub to enter shower. Patient verbalized understanding and is to check into the above options.      Plan:   [x] Continue per plan of care [] Alter current plan (see comments)  [] Plan of care initiated [] Hold pending MD visit [] Discharge    Plan for Next Session: add box step and step downs    Electronically signed by: Anish Quinonez, [id number] [address]

## 2018-07-19 ENCOUNTER — HOSPITAL ENCOUNTER (OUTPATIENT)
Dept: PHYSICAL THERAPY | Age: 68
Discharge: HOME | End: 2018-07-20
Admitting: INTERNAL MEDICINE

## 2018-07-23 ENCOUNTER — HOSPITAL ENCOUNTER (OUTPATIENT)
Dept: PHYSICAL THERAPY | Age: 68
Discharge: HOME | End: 2018-07-24
Admitting: INTERNAL MEDICINE

## 2018-07-23 NOTE — FLOWSHEET NOTE
Physical Therapy Daily Treatment Note  Date:  2018    Patient Name:  Connee Boxer    :  1950  MRN: [id number]     Restrictions/Precautions:  B quad weakness- R 2/5 and L 3/5. 18 had lumbar surgery to attempt to straighten back by connecting prior S1 - L1 fusion to T8- 13 hour surgery. Pt seems to have nerve palsy from laying prone which has caused B quad weakness. MD said she regain strength quickly  Pertinent Medical History: OA, HTN, Restless Leg, SOB, Back Surgery- multiple fusions prior to current one. Cervical Fusion. L TKR. L shoulder surgery. 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 Physician Information:  Referring Practitioner: Dr. Quigley Floor of care signed (Y/N):    Visit# / total visits:     Pain level: 0-1/10     G-Code (if applicable):      Date / Visit # G-Code Applied:  18  PT G-Codes  Functional Assessment Tool Used: PT assessment  Functional Limitation: Mobility: Walking and moving around  Mobility: Walking and Moving Around Current Status (): At least 80 percent but less than 100 percent impaired, limited or restricted  Mobility: Walking and Moving Around Goal Status (): At least 1 percent but less than 20 percent impaired, limited or restricted    Progress Note: [x]  Yes  []  No  Next due by: Visit #10      History of Injury:  Pt states he had lumbar surgery on 18 and 18 to do S1 disectomy and then to correct S1 to L5 fusion by connecting it to T8 to improve posture. Pt was having left leg pain from his knee down that was really bad, leading to the surgery. Currently, 0-1/10 pain in low back, nothing in LE. Pt states he was discharged from [facility] on 18. At that time he was doing transfers independently. Tolerance:  [x] Patient tolerated treatment well [] Patient limited by fatigue  [] Patient limited by pain  [] Patient limited by other medical complications  [] Other:     Prognosis: [x] Good [] Fair  [] Poor    Patient Requires Follow-up: [x] Yes  [] No    Plan:   [x] Continue per plan of care [] Alter current plan (see comments)  [] Plan of care initiated [] Hold pending MD visit [] Discharge    Plan for Next Session:    Progress standing tolerance and gait in // bars  Work on quad strengthening with Estim if helping    Electronically signed by:  Can Landry PTA

## 2018-07-24 ENCOUNTER — HOSPITAL ENCOUNTER (OUTPATIENT)
Dept: PHYSICAL THERAPY | Age: 68
Discharge: HOME | End: 2018-07-25
Admitting: INTERNAL MEDICINE

## 2018-07-26 ENCOUNTER — HOSPITAL ENCOUNTER (OUTPATIENT)
Dept: PHYSICAL THERAPY | Age: 68
Discharge: HOME | End: 2018-07-27
Admitting: INTERNAL MEDICINE

## 2018-07-26 NOTE — FLOWSHEET NOTE
Physical Therapy Aquatic Flow Sheet   Date:  2018    Patient Name:  Afsaneh Richards    :  1950    Restrictions/Precautions: B quad weakness- R 2/5 and L 3/5. 18 had lumbar surgery to attempt to straighten back by connecting prior S1 - L1 fusion to T8- 13 hour surgery. Pt seems to have nerve palsy from laying prone which has caused B quad weakness. MD said she regain strength quickly     Pertinent Medical History: OA, HTN, Restless Leg, SOB, Back Surgery- multiple fusions prior to current one. Cervical Fusion. L TKR. L shoulder surgery. R shoulder surgery     Diagnosis:  B LE weakness  Treatment Diagnosis:  B LE weakness, impaired standing balance and ability to ambulate    Insurance/Certification information: [employer]  Referring Physician: Dr. Randy Harper     Visit# / total visits:    Pain level: 0/10         Progress Note: []  Yes  [x]  No  Next due by: Visit #10:      Subjective:  MD reported at appt yesterday that he has femoral neuropathy and could take up to 18 months to heal.  MD thinks function will eventually return. Notes MD ordered patient a brace for LEs so he can return to walking. Per results EMG showed:  1. 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 Axonal continuity was not seen in all right lower extremity femoral-innervated muscles.    2. 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    Objective:  Observation:  See eval  Test measurements:      Key  B= Belt DB= Dumbells T= Theratube   G=Gloves N= Noodles W= Weights   P= Paddles WW=Water Walker K= Kickboard        Transfers:  Lift chair      % Immersion: 75%            Ambulation: laps Exercises UE:      Forwards 4 + 2 + 2  Shoulder Shrugs     Lateral 2 + 2   Shoulder circles     Marching    Scapular Retraction      Retro 1 Rolling      [place]  Push Downs    Swim using LEs only with belt and 2 noodles under arms for support 1/2 lap IR/ER      Punching    Stretching:  Rowing lewis  [] Patient limited by pain  [] Patient limited by other medical complications  [] Other:     Prognosis: [x] Good [] Fair  [] Poor    Patient Requires Follow-up: [x] Yes  [] No    Patient Education:   Plan:   [x] Continue per plan of care [] Alter current plan (see comments)  [] Plan of care initiated [] Hold pending MD visit [] Discharge    Plan for Next Session:  Add lateral lunges. Issue patient aquatic HEP. Patient to continue on own with aquatics after 1 more session and follow up with land PT.      Electronically signed by: Aditya Prieto, [id number] [address]

## 2018-07-30 ENCOUNTER — HOSPITAL ENCOUNTER (OUTPATIENT)
Dept: PHYSICAL THERAPY | Age: 68
Discharge: HOME | End: 2018-08-20
Admitting: INTERNAL MEDICINE

## 2018-07-30 NOTE — FLOWSHEET NOTE
Physical Therapy Daily Treatment Note  Date:  2018    Patient Name:  Lora Pedersen    :  1950  MRN: [id number]     Restrictions/Precautions:  B quad weakness- R 2/5 and L 3/5. 18 had lumbar surgery to attempt to straighten back by connecting prior S1 - L1 fusion to T8- 13 hour surgery. Pt seems to have nerve palsy from laying prone which has caused B quad weakness. MD said she regain strength quickly  Pertinent Medical History: OA, HTN, Restless Leg, SOB, Back Surgery- multiple fusions prior to current one. Cervical Fusion. L TKR. L shoulder surgery. R shoulder surgery    Medical/Treatment Diagnosis Information:  · Diagnosis: B LE weakness  · Treatment Diagnosis: B LE weakness, impaired standing balance and ability to ambulate    Insurance/Certification information:  PT Insurance Information: [employer]. Physician Information:  Referring Practitioner: Dr. Maxwell Bhatt of care signed (Y/N):    Visit# / total visits:     + 12  Pain level: 0-1/10     G-Code (if applicable):      Date / Visit # G-Code Applied:  18  PT G-Codes  Functional Assessment Tool Used: PT assessment  Functional Limitation: Mobility: Walking and moving around  Mobility: Walking and Moving Around Current Status (): At least 80 percent but less than 100 percent impaired, limited or restricted  Mobility: Walking and Moving Around Goal Status (): At least 1 percent but less than 20 percent impaired, limited or restricted    Progress Note: [x]  Yes  []  No  Next due by: Visit #10      History of Injury:  Pt states he had lumbar surgery on 18 and 18 to do S1 disectomy and then to correct S1 to L5 fusion by connecting it to T8 to improve posture. Pt was having left leg pain from his knee down that was really bad, leading to the surgery. Currently, 0-1/10 pain in low back, nothing in LE. Pt states he was discharged from [facility] on 18.  At that time he was doing transfers independently. Last day at [facility] he ambulated 50 feet with RW and CGA from PT. Pt states his left leg has been getting stronger, left leg is still weak. Subjective:     6/11/18: States his R foot is kicking out a little more  06/13/18 Patient reports he is able to stand at kitchen sink a little longer. 6/18/18: Pt states he is doing good. R leg is moving further out when he kicks- glad it is progressing like the left one did  6/20/18: Pt states he stood for 12 minutes at home at the sink and was reaching around some to do dishes. Felt steady, no instances of knees buckling on him. 6/25/18: Things are continuing to progress  6/27/18: Pt states things are going good  7/2/18: States his right knee was stiff today  7/9/18: Pt reports both knees are stiff today  7/11/18: Pt states he had the EMG done yesterday but is not sure of the results- talks with Dr. Kylie Escalante in two weeks  7/16/18: Pt states he is continuing to improve. Pool therapy went well  07/23/18 Patient reports he has been walking in a pool more. States right knee feels a little stiff today. 07/30/18 Patient reports MD ordered a brace for his right knee so he can start walking. Objective:  Observation:   Test measurements:    6/1/18 Knee Extension MMT: R 2/5, L 3+/5    Exercises:  Exercise/Equipment Resistance/Repetitions Other comments   // Bars    semisquats  HR Stood x 5 min, x 5 min with CGA. 15 x   15 x     Nu Step X 10 min, level 4    Seated LAQ X 10 B with assist- did eccentric on left, 4# on right                             HEP      Supine Quad and Glut set, bridging  5/22/18   Seated HS STretch  5/22/18                    Other Therapeutic Activities:      Home Exercise Program:    5/24/18: Practiced sit to stand at / bar to mimic standing at home at sink.  Pt to start standing at home with chair behind him and 1 person assist    Manual Treatments:        Modalities:    Estim (attended): VMS to R quad x 10 min: 10 sec on/ 10 sec off - did quad contraction when on .      Timed Code Treatment Minutes:  57    Total Treatment Minutes:  62    Treatment/Activity Tolerance:  [x] Patient tolerated treatment well [] Patient limited by fatigue  [] Patient limited by pain  [] Patient limited by other medical complications  [] Other:     Prognosis: [x] Good [] Fair  [] Poor    Patient Requires Follow-up: [x] Yes  [] No    Plan:   [x] Continue per plan of care [] Alter current plan (see comments)  [] Plan of care initiated [] Hold pending MD visit [] Discharge    Plan for Next Session:    Progress standing tolerance and gait in // bars  Work on quad strengthening with Estim if helping    Electronically signed by:  Simba Sanchez PTA

## 2018-07-31 ENCOUNTER — HOSPITAL ENCOUNTER (OUTPATIENT)
Dept: PHYSICAL THERAPY | Age: 68
Discharge: HOME | End: 2018-08-01
Admitting: INTERNAL MEDICINE

## 2018-07-31 NOTE — FLOWSHEET NOTE
Abd/Add    Piriformis   Horiz Abd/Add     Hip flexor    Arm Circles     SKTC   PNF Diagonals    DKTC  UE oscillations f/b, s/s    ITB   Wall Push-ups    Quad  Figure 8's    Mid back   Buoy pull/push downs    UT  Tband rows    Rhom  Tband lats    Post Shoulder  Lumbar Rot w/ paddles    Pec   Small ball pull downs                   diagonals             Cervical:       AROM Flex       AROM Extension     LEs exercises:   AROM Side Bending    Marching 10x  AROM Rotation    Heel Raises 10x  Chin tucks    Toe Raises 10x  Chin nods     Squats 10x       Hamstring Curls 10x      Hip Flexion 10x  Balance:      Hip Abduction 10x SLS    Hip Circles 10x Tandem stance x30 sec L/R   TA set 10x NBOS eyes open    Glut Set 10x NBOS eyes closed    Hip Extension 10x Hand to Opposite Knee    Hip Adduction    Box Step 3x L/R    Hip IR   Noodle Stance x30 sec     Hip ER  Stop/Go Gait    Fig 8's  Switch Gait                Seated: Lift chair Functional:    Ankle Pumps 20x Step up forward 10x L/R   Ankle circles  Step up lateral 10x L/R   Knee flex & ext 20x Step down 10x L/R   Hip Abd & Adduction 20x Shallow Water squats    Bicycle  20x Crate Lifts    Add Set with ball 10x Lunges forward 5x L/R   LX stab with med ball throws  Lunges lateral 5 R/L   Ankle INV  Lunges retro    Ankle EV  Lower ab curl with noodle      Upper ab curl with ball      Med ball straight lifts      Med ball diagonal lifts      Hydrorider          Noodle:      Leg Press Small 10x L/R Deep Water:    Noodle hang at wall  Jog    Noodle hang deep water  Jumping Jacks    Noodle Bicycle x3 mins Heel to toe      Hand to opposite knee      Cross country skier      Rocking Horse        Timed Code Treatment Minutes:  55    Total Treatment Minutes:  55    Treatment/Activity Tolerance:  [x] Patient tolerated treatment well [] Patient limited by fatique  [] Patient limited by pain  [] Patient limited by other medical complications  [] Other:     Prognosis: [x] Good [] Fair  []

## 2018-08-01 ENCOUNTER — HOSPITAL ENCOUNTER (OUTPATIENT)
Dept: OTHER | Age: 68
Discharge: HOME | End: 2018-08-01
Attending: INTERNAL MEDICINE | Admitting: INTERNAL MEDICINE

## 2018-08-02 ENCOUNTER — HOSPITAL ENCOUNTER (OUTPATIENT)
Dept: PHYSICAL THERAPY | Age: 68
Discharge: HOME | End: 2018-08-03
Admitting: INTERNAL MEDICINE

## 2018-08-02 NOTE — FLOWSHEET NOTE
Follow-up: [x] Yes  [] No    Patient Education: 7/31:   Pt given written aquatic HEP for pt to use at [facility]. Pt questions were answered. Pt verbalized an understanding of materials given. Plan:   [x] Continue per plan of care [] Alter current plan (see comments)  [] Plan of care initiated [] Hold pending MD visit [] Discharge    Plan for Next Session:  Pt to continue  land PT only and aquatics on own.     Electronically signed by: Anaid Bartholomew,

## 2018-08-06 ENCOUNTER — HOSPITAL ENCOUNTER (OUTPATIENT)
Dept: PHYSICAL THERAPY | Age: 68
Discharge: HOME | End: 2018-08-07
Admitting: INTERNAL MEDICINE

## 2018-08-06 NOTE — FLOWSHEET NOTE
Physical Therapy Daily Treatment Note  Date:  2018    Patient Name:  Geetha uLa    :  1950  MRN: [id number]     Restrictions/Precautions:  B quad weakness- R 2/5 and L 3/5. 18 had lumbar surgery to attempt to straighten back by connecting prior S1 - L1 fusion to T8- 13 hour surgery. Pt seems to have nerve palsy from laying prone which has caused B quad weakness. MD said she regain strength quickly  Pertinent Medical History: OA, HTN, Restless Leg, SOB, Back Surgery- multiple fusions prior to current one. Cervical Fusion. L TKR. L shoulder surgery. R shoulder surgery    Medical/Treatment Diagnosis Information:  · Diagnosis: B LE weakness  · Treatment Diagnosis: B LE weakness, impaired standing balance and ability to ambulate    Insurance/Certification information:  PT Insurance Information: [employer]. Physician Information:  Referring Practitioner: Dr. Tiffanie Waldron of care signed (Y/N):    Visit# / total visits:     +   Pain level: 0-1/10     G-Code (if applicable):      Date / Visit # G-Code Applied:  18  PT G-Codes  Functional Assessment Tool Used: PT assessment  Functional Limitation: Mobility: Walking and moving around  Mobility: Walking and Moving Around Current Status (): At least 80 percent but less than 100 percent impaired, limited or restricted  Mobility: Walking and Moving Around Goal Status (): At least 1 percent but less than 20 percent impaired, limited or restricted    Progress Note: [x]  Yes  []  No  Next due by: Visit #10      History of Injury:  Pt states he had lumbar surgery on 18 and 18 to do S1 disectomy and then to correct S1 to L5 fusion by connecting it to T8 to improve posture. Pt was having left leg pain from his knee down that was really bad, leading to the surgery. Currently, 0-1/10 pain in low back, nothing in LE. Pt states he was discharged from [facility] on 18.  At that time he was doing transfers independently. Last day at [facility] he ambulated 50 feet with RW and CGA from PT. Pt states his left leg has been getting stronger, left leg is still weak. Subjective:     6/25/18: Things are continuing to progress  6/27/18: Pt states things are going good  7/2/18: States his right knee was stiff today  7/9/18: Pt reports both knees are stiff today  7/11/18: Pt states he had the EMG done yesterday but is not sure of the results- talks with Dr. Hany Grijalva in two weeks  7/16/18: Pt states he is continuing to improve. Pool therapy went well  07/23/18 Patient reports he has been walking in a pool more. States right knee feels a little stiff today. 07/30/18 Patient reports MD ordered a brace for his right knee so he can start walking. 8/6/18: States aquatic PT went well- he is doing it on his own now. Pt states he wants to be able to walk a few steps with a walker to be able to get in the bathroom. Objective:  Observation:   Test measurements:    6/1/18 Knee Extension MMT: R 2/5, L 3+/5    Exercises:  Exercise/Equipment Resistance/Repetitions Other comments   // Bars    semisquats  HR     Nu Step X 10 min, level 4    Seated LAQ X 10 B with assist- did eccentric on left, 4# on right    // Bars Pt ambulated 2 laps with CGA and W/C follow  Then pt ambulated 1 lap with RW in // bars                        HEP      Supine Quad and Glut set, bridging  5/22/18   Seated HS STretch  5/22/18                    Other Therapeutic Activities:    8/6/18: Discussed possibilities of pt ambulating into bathroom with RW and what equipment/modifications he might need. PT recommended pt not do this until he has knee brace. Home Exercise Program:    5/24/18: Practiced sit to stand at / bar to mimic standing at home at sink.  Pt to start standing at home with chair behind him and 1 person assist    Manual Treatments:        Modalities:    Estim (attended): VMS to R quad x 10 min: 10 sec on/ 10 sec off - did quad contraction when on

## 2018-08-08 ENCOUNTER — HOSPITAL ENCOUNTER (OUTPATIENT)
Dept: PHYSICAL THERAPY | Age: 68
Discharge: HOME | End: 2018-08-09
Admitting: INTERNAL MEDICINE

## 2018-08-15 ENCOUNTER — HOSPITAL ENCOUNTER (OUTPATIENT)
Dept: PHYSICAL THERAPY | Age: 68
Discharge: HOME | End: 2018-08-16
Admitting: INTERNAL MEDICINE

## 2018-08-15 NOTE — FLOWSHEET NOTE
independently. Last day at [facility] he ambulated 50 feet with RW and CGA from PT. Pt states his left leg has been getting stronger, left leg is still weak. Subjective:     7/11/18: Pt states he had the EMG done yesterday but is not sure of the results- talks with Dr. Lissa Damon in two weeks  7/16/18: Pt states he is continuing to improve. Pool therapy went well  07/23/18 Patient reports he has been walking in a pool more. States right knee feels a little stiff today. 07/30/18 Patient reports MD ordered a brace for his right knee so he can start walking. 8/6/18: States aquatic PT went well- he is doing it on his own now. Pt states he wants to be able to walk a few steps with a walker to be able to get in the bathroom. 8/8/18: Doing good, R knee is getting stronger. SHould be getting brace soon  8/15/18: Pt states he is walking to the bathroom with his RW- hasn't buckled on him      Objective:  Observation:   Test measurements:    6/1/18 Knee Extension MMT: R 2/5, L 3+/5    Exercises:  Exercise/Equipment Resistance/Repetitions Other comments   // Bars   Mini Squats 4 x 10 reps with CGA    Nu Step X 10 min, level 4    Seated LAQ 2 X 20 B with assist- 1#  On right, 4# on Left Increase next visit   // Bars Pt ambulated 5 laps with CGA and then 5 laps in // bars with CGA Cueing to lock R knee straight before taking steps                       HEP      Supine Quad and Glut set, bridging  5/22/18   Seated HS STretch  5/22/18                    Other Therapeutic Activities:    8/6/18: Discussed possibilities of pt ambulating into bathroom with RW and what equipment/modifications he might need. PT recommended pt not do this until he has knee brace that locks his knee. Home Exercise Program:    5/24/18: Practiced sit to stand at / bar to mimic standing at home at sink.  Pt to start standing at home with chair behind him and 1 person assist    Manual Treatments:        Modalities:    Estim (attended): VMS to R quad x 10 min: 10 sec on/ 10 sec off - did quad contraction when on .      Timed Code Treatment Minutes:  45    Total Treatment Minutes:  68    Treatment/Activity Tolerance:  [x] Patient tolerated treatment well [] Patient limited by fatigue  [] Patient limited by pain  [] Patient limited by other medical complications  [] Other:     Prognosis: [x] Good [] Fair  [] Poor    Patient Requires Follow-up: [x] Yes  [] No    Plan:   [x] Continue per plan of care [] Alter current plan (see comments)  [] Plan of care initiated [] Hold pending MD visit [] Discharge    Plan for Next Session:    Progress standing tolerance and gait in // bars  Work on quad strengthening with Estim if helping    Electronically signed by:  Aga Boswell, PT

## 2018-08-20 ENCOUNTER — HOSPITAL ENCOUNTER (OUTPATIENT)
Dept: PHYSICAL THERAPY | Age: 68
Discharge: HOME | End: 2018-08-21
Admitting: INTERNAL MEDICINE

## 2018-08-20 NOTE — FLOWSHEET NOTE
Physical Therapy Daily Treatment Note  Date:  2018    Patient Name:  Owen Roe    :  1950  MRN: [id number]     Restrictions/Precautions:  B quad weakness- R 2/5 and L 3/5. 18 had lumbar surgery to attempt to straighten back by connecting prior S1 - L1 fusion to T8- 13 hour surgery. Pt seems to have nerve palsy from laying prone which has caused B quad weakness. MD said she regain strength quickly    Pertinent Medical History: OA, HTN, Restless Leg, SOB, Back Surgery- multiple fusions prior to current one. Cervical Fusion. L TKR. L shoulder surgery. R shoulder surgery    Medical/Treatment Diagnosis Information:  · Diagnosis: B LE weakness  · Treatment Diagnosis: B LE weakness, impaired standing balance and ability to ambulate    Insurance/Certification information:  PT Insurance Information: [employer]. Physician Information:  Referring Practitioner: Dr. Tamia Lucas of care signed (Y/N):    Visit# / total visits:     +   Pain level: 0-1/10     G-Code (if applicable):      Date / Visit # G-Code Applied:  18  PT G-Codes  Functional Assessment Tool Used: PT assessment  Functional Limitation: Mobility: Walking and moving around  Mobility: Walking and Moving Around Current Status (): At least 80 percent but less than 100 percent impaired, limited or restricted  Mobility: Walking and Moving Around Goal Status (): At least 1 percent but less than 20 percent impaired, limited or restricted    Progress Note: [x]  Yes  []  No  Next due by: Visit #10      History of Injury:  Pt states he had lumbar surgery on 18 and 18 to do S1 disectomy and then to correct S1 to L5 fusion by connecting it to T8 to improve posture. Pt was having left leg pain from his knee down that was really bad, leading to the surgery. Currently, 0-1/10 pain in low back, nothing in LE. Pt states he was discharged from [facility] on 18.  At that time he was doing transfers

## 2018-08-22 ENCOUNTER — HOSPITAL ENCOUNTER (OUTPATIENT)
Dept: OTHER | Age: 68
Discharge: HOME | End: 2018-08-31
Attending: INTERNAL MEDICINE | Admitting: INTERNAL MEDICINE

## 2018-08-27 ENCOUNTER — HOSPITAL ENCOUNTER (OUTPATIENT)
Dept: PHYSICAL THERAPY | Age: 68
Discharge: HOME | End: 2018-08-28
Admitting: INTERNAL MEDICINE

## 2018-08-27 NOTE — FLOWSHEET NOTE
independently. Last day at [facility] he ambulated 50 feet with RW and CGA from PT. Pt states his left leg has been getting stronger, left leg is still weak. Subjective:     7/11/18: Pt states he had the EMG done yesterday but is not sure of the results- talks with Dr. Rasheed Zhou in two weeks  7/16/18: Pt states he is continuing to improve. Pool therapy went well  07/23/18 Patient reports he has been walking in a pool more. States right knee feels a little stiff today. 07/30/18 Patient reports MD ordered a brace for his right knee so he can start walking. 8/6/18: States aquatic PT went well- he is doing it on his own now. Pt states he wants to be able to walk a few steps with a walker to be able to get in the bathroom. 8/8/18: Doing good, R knee is getting stronger. SHould be getting brace soon  8/15/18: Pt states he is walking to the bathroom with his RW- hasn't buckled on him  8/20/18: Pt states he is doing good  8/22/18: Still doing good, ready to walk more with his walker  08/27/18: Patient reports knee is getting stronger. Objective:  Observation:   Test measurements:    6/1/18 Knee Extension MMT: R 2+/5, L 3+/5    Exercises:  Exercise/Equipment Resistance/Repetitions Other comments   // Bars   Mini Squats 5 x 10 reps with SBA    Nu Step X 10 min, level 4    Seated LAQ 2 X 20 B with assist- 2#  On right, 5# on Left Increase next visit   // Bars Pt ambulated 6 laps with CGA x 3 reps for total of 18 laps Cueing to lock R knee straight before taking steps                       HEP      Supine Quad and Glut set, bridging  5/22/18   Seated HS STretch  5/22/18                    Other Therapeutic Activities:    8/6/18: Discussed possibilities of pt ambulating into bathroom with RW and what equipment/modifications he might need. PT recommended pt not do this until he has knee brace that locks his knee. Home Exercise Program:    5/24/18: Practiced sit to stand at / bar to mimic standing at home at sink.  Pt to start standing at home with chair behind him and 1 person assist    Manual Treatments:        Modalities:    Estim (attended): VMS to R quad x 10 min: 10 sec on/ 10 sec off - did quad contraction when on .      Timed Code Treatment Minutes:  55    Total Treatment Minutes:  63    Treatment/Activity Tolerance:  [x] Patient tolerated treatment well [] Patient limited by fatigue  [] Patient limited by pain  [] Patient limited by other medical complications  [] Other:     Prognosis: [x] Good [] Fair  [] Poor    Patient Requires Follow-up: [x] Yes  [] No    Plan:   [x] Continue per plan of care [] Alter current plan (see comments)  [] Plan of care initiated [] Hold pending MD visit [] Discharge    Plan for Next Session:    Progress standing tolerance and gait in // bars  Work on quad strengthening with Estim if helping    Electronically signed by:  Raya Hayward PTA

## 2018-09-01 ENCOUNTER — HOSPITAL ENCOUNTER (OUTPATIENT)
Dept: OTHER | Age: 68
Discharge: HOME | End: 2018-09-01
Attending: INTERNAL MEDICINE | Admitting: INTERNAL MEDICINE

## 2018-09-24 ENCOUNTER — HOSPITAL ENCOUNTER (OUTPATIENT)
Dept: PHYSICAL THERAPY | Age: 68
Setting detail: THERAPIES SERIES
Discharge: HOME | End: 2018-09-24
Payer: MEDICARE

## 2018-09-24 PROCEDURE — 97110 THERAPEUTIC EXERCISES: CPT

## 2018-09-24 PROCEDURE — G0283 ELEC STIM OTHER THAN WOUND: HCPCS

## 2018-09-24 NOTE — FLOWSHEET NOTE
Physical Therapy Daily Treatment Note  Date:  2018    Patient Name:  Jillian Long    :  1950  MRN: [id number]     Restrictions/Precautions:    B quad weakness- R 2/5 and L 3/5. 18 had lumbar surgery to attempt to straighten back by connecting prior S1 - L1 fusion to T8- 13 hour surgery. Pt seems to have nerve palsy from laying prone which has caused B quad weakness. MD said she regain strength quickly    Pertinent Medical History:   OA, HTN, Restless Leg, SOB, Back Surgery- multiple fusions prior to current one. Cervical Fusion. L TKR. L shoulder surgery. R shoulder surgery    Medical/Treatment Diagnosis Information:  · Diagnosis: B LE weakness  · Treatment Diagnosis: B LE weakness, impaired standing balance and ability to ambulate    Insurance/Certification information: PT Insurance Information: [employer] Physician Information:  Referring Practitioner: Dr. Herber Rosario of care signed (Y/N):    Visit# / total visits:     + + 2  Pain level: 0-1/10     G-Code (if applicable):      Date / Visit # G-Code Applied:  18  PT G-Codes  Functional Assessment Tool Used: PT assessment  Functional Limitation: Mobility: Walking and moving around  Mobility: Walking and Moving Around Current Status (): At least 80 percent but less than 100 percent impaired, limited or restricted  Mobility: Walking and Moving Around Goal Status (): At least 1 percent but less than 20 percent impaired, limited or restricted    Progress Note: [x]  Yes  []  No  Next due by: Visit #10      History of Injury:  Pt states he had lumbar surgery on 18 and 18 to do S1 disectomy and then to correct S1 to L5 fusion by connecting it to T8 to improve posture. Pt was having left leg pain from his knee down that was really bad, leading to the surgery. Currently, 0-1/10 pain in low back, nothing in LE. Pt states he was discharged from [facility] on 18.  At that time he was doing transfers

## 2018-09-26 ENCOUNTER — HOSPITAL ENCOUNTER (OUTPATIENT)
Dept: PHYSICAL THERAPY | Age: 68
Setting detail: THERAPIES SERIES
Discharge: HOME | End: 2018-09-26
Payer: MEDICARE

## 2018-09-26 PROCEDURE — 97116 GAIT TRAINING THERAPY: CPT

## 2018-09-26 PROCEDURE — 97110 THERAPEUTIC EXERCISES: CPT

## 2018-09-26 PROCEDURE — 97530 THERAPEUTIC ACTIVITIES: CPT

## 2018-09-26 NOTE — FLOWSHEET NOTE
Treatments:        Modalities:    Estim (attended): VMS to R quad x 10 min: 10 sec on/ 10 sec off - did quad contraction when on .      Timed Code Treatment Minutes:  56    Total Treatment Minutes:  64    Treatment/Activity Tolerance:  [x] Patient tolerated treatment well [] Patient limited by fatigue  [] Patient limited by pain  [] Patient limited by other medical complications  [] Other:     Prognosis: [x] Good [] Fair  [] Poor    Patient Requires Follow-up: [x] Yes  [] No    Plan:   [x] Continue per plan of care [] Alter current plan (see comments)  [] Plan of care initiated [] Hold pending MD visit [] Discharge    Plan for Next Session:    Progress gait training and standing balance in // bars with no UE support    Electronically signed by:  Aga Boswell, PT

## 2018-10-01 ENCOUNTER — HOSPITAL ENCOUNTER (OUTPATIENT)
Dept: PHYSICAL THERAPY | Age: 68
Setting detail: THERAPIES SERIES
Discharge: HOME | End: 2018-10-01
Payer: MEDICARE

## 2018-10-01 PROCEDURE — G0283 ELEC STIM OTHER THAN WOUND: HCPCS

## 2018-10-01 PROCEDURE — 97110 THERAPEUTIC EXERCISES: CPT

## 2018-10-01 NOTE — FLOWSHEET NOTE
mimic standing at home at sink. Pt to start standing at home with chair behind him and 1 person assist    Manual Treatments:        Modalities:    Estim (attended): VMS to R quad x 10 min: 10 sec on/ 10 sec off - did quad contraction when on .      Timed Code Treatment Minutes:  56    Total Treatment Minutes:  64    Treatment/Activity Tolerance:  [x] Patient tolerated treatment well [] Patient limited by fatigue  [] Patient limited by pain  [] Patient limited by other medical complications  [] Other:     Prognosis: [x] Good [] Fair  [] Poor    Patient Requires Follow-up: [x] Yes  [] No    Plan:   [x] Continue per plan of care [] Alter current plan (see comments)  [] Plan of care initiated [] Hold pending MD visit [] Discharge    Plan for Next Session:    Progress gait training and standing balance in // bars with no UE support    Electronically signed by:  Rohit Andrews PTA

## 2018-10-03 ENCOUNTER — HOSPITAL ENCOUNTER (OUTPATIENT)
Dept: PHYSICAL THERAPY | Age: 68
Setting detail: THERAPIES SERIES
Discharge: HOME | End: 2018-10-03
Payer: MEDICARE

## 2018-10-03 PROCEDURE — 97530 THERAPEUTIC ACTIVITIES: CPT

## 2018-10-03 PROCEDURE — 97116 GAIT TRAINING THERAPY: CPT

## 2018-10-03 PROCEDURE — 97110 THERAPEUTIC EXERCISES: CPT

## 2018-10-08 ENCOUNTER — HOSPITAL ENCOUNTER (OUTPATIENT)
Dept: PHYSICAL THERAPY | Age: 68
Setting detail: THERAPIES SERIES
Discharge: HOME | End: 2018-10-08
Payer: MEDICARE

## 2018-10-08 PROCEDURE — G0283 ELEC STIM OTHER THAN WOUND: HCPCS

## 2018-10-08 PROCEDURE — 97110 THERAPEUTIC EXERCISES: CPT

## 2018-10-10 ENCOUNTER — HOSPITAL ENCOUNTER (OUTPATIENT)
Dept: PHYSICAL THERAPY | Age: 68
Setting detail: THERAPIES SERIES
Discharge: HOME | End: 2018-10-10
Payer: MEDICARE

## 2018-10-10 PROCEDURE — G0283 ELEC STIM OTHER THAN WOUND: HCPCS

## 2018-10-10 PROCEDURE — 97110 THERAPEUTIC EXERCISES: CPT

## 2018-10-10 NOTE — FLOWSHEET NOTE
transfers independently. Last day at [facility] he ambulated 50 feet with RW and CGA from PT. Pt states his left leg has been getting stronger, left leg is still weak. Subjective:     9/5/18: States he is doing great. Has been walking short distances at home with RW and states his knee has not given out at all  9/10/18: Pt states he is doing good. Has been walking more at home with the RW and was able to go down two steps to his porch with holding onto the house. 9/12/18: Pt states he is doing good, walking more with his RW but is being careful with transfers. Knee has not buckled on him. 9/17/18: Pt states he went out to a concert over the weekend and had some trouble getting in and out of the seat-used his RW to get in and out. Still doing water exercises  9/24/18 Patient reports he has been walking more with RW. States knee feels a little more stable. 9/26/18: Pt states he is doing better. 10/01/18: Patient reports he has been using rolling walker to get around instead of wheelchair. 10/3/18: States he is doing good, legs get tired on him occasionally but he just takes a break  10/08/18: Patient reports his legs doing good,only occasional fatigue in right leg. 10/10/18: Patient reports knee were  a little sore yesterday.       Objective:  Observation: 10/3/18: Pt came to PT clinic with 4WW  Test measurements:    9/26/18 Knee Extension MMT: R 3+/5, L 4-/5    Exercises:  Exercise/Equipment Resistance/Repetitions Other comments   // Bars   Mini Squats 2 x 20 reps    Nu Step X 10 min, Level 5    Seated LAQ 2 X 20 B with assist- 5#  on right, 10# on Left    // BarsPt ambulated 6 laps with CGA in // bars and no UE support  DLB on airex x 2 min with perturbationsTandem Balance x 1 min B  Step Up  6 inch  Fwd and Side   2 x 10  Wobble board 2 min  iram disc balance 2 min  Steady                     HEP      Supine Quad and Glut set, bridging  5/22/18   Seated HS STretch  5/22/18                    Other

## 2018-10-15 ENCOUNTER — HOSPITAL ENCOUNTER (OUTPATIENT)
Dept: PHYSICAL THERAPY | Age: 68
Setting detail: THERAPIES SERIES
Discharge: HOME | End: 2018-10-15
Payer: MEDICARE

## 2018-10-15 PROCEDURE — G0283 ELEC STIM OTHER THAN WOUND: HCPCS

## 2018-10-15 PROCEDURE — 97110 THERAPEUTIC EXERCISES: CPT

## 2018-10-17 ENCOUNTER — HOSPITAL ENCOUNTER (OUTPATIENT)
Dept: PHYSICAL THERAPY | Age: 68
Setting detail: THERAPIES SERIES
Discharge: HOME | End: 2018-10-17
Payer: MEDICARE

## 2018-10-17 PROCEDURE — 97530 THERAPEUTIC ACTIVITIES: CPT

## 2018-10-17 PROCEDURE — 97110 THERAPEUTIC EXERCISES: CPT

## 2018-10-17 PROCEDURE — 97116 GAIT TRAINING THERAPY: CPT

## 2018-10-22 ENCOUNTER — HOSPITAL ENCOUNTER (OUTPATIENT)
Dept: PHYSICAL THERAPY | Age: 68
Setting detail: THERAPIES SERIES
Discharge: HOME | End: 2018-10-22
Payer: MEDICARE

## 2018-10-22 PROCEDURE — 97530 THERAPEUTIC ACTIVITIES: CPT

## 2018-10-22 PROCEDURE — 97110 THERAPEUTIC EXERCISES: CPT

## 2018-10-22 NOTE — FLOWSHEET NOTE
transfers independently. Last day at [facility] he ambulated 50 feet with RW and CGA from PT. Pt states his left leg has been getting stronger, left leg is still weak. Subjective:     10/01/18: Patient reports he has been using rolling walker to get around instead of wheelchair. 10/3/18: States he is doing good, legs get tired on him occasionally but he just takes a break  10/08/18: Patient reports his legs doing good,only occasional fatigue in right leg. 10/10/18: Patient reports knee were  a little sore yesterday. 10/15/18: Patient reports legs are getting stronger. States he still don't have much sensation below the knees. 10/17/18: States things are going good- know his limitations at home. Trouble reaching down for ADLs  10/22/18: Pt states he is doing good, had to go up and down 22 steps over the weekend. Was able to do it with 1 HR. Objective:  Observation:   10/3/18: Pt came to PT clinic with 4WW        Test measurements:           10/17/18 Knee Extension MMT: R 3+/5, L 4/5    Exercises:  Exercise/Equipment Resistance/Repetitions Other comments   // Bars   Mini Squats 2 x 20 reps    Nu Step X 10 min, Level 5    Seated LAQ 2 X 20 B with assist- 5#  on right, 10# on Left    // BarsPt ambulated 6 laps with SBA  in // bars and no UE support, 1 lap side stepping  DLB on airex x 2 min with perturbations  DLB with minisquats on airexTandem Balance x 1 min B on airex  Step Up  6 inch  Fwd and Side   2 x 10  Wobble board 2 min  Talia Disc balance 2 min  Steady                     HEP      Supine Quad and Glut set, bridging  5/22/18   Seated HS STretch  5/22/18                    Other Therapeutic Activities:    8/6/18: Discussed possibilities of pt ambulating into bathroom with RW and what equipment/modifications he might need. PT recommended pt not do this until he has knee brace that locks his knee.   10/22/18: Practiced pt kneeling to one knee and getting up using his 4WW- Needed MIN A to get up and used // bars to get down to one knee      Home Exercise Program:    5/24/18: Practiced sit to stand at / bar to mimic standing at home at sink. Pt to start standing at home with chair behind him and 1 person assist    Manual Treatments:        Modalities:    Estim (attended): VMS to R quad x 10 min: 10 sec on/ 10 sec off - did quad contraction when on .      Timed Code Treatment Minutes:  62     Total Treatment Minutes:  75    Treatment/Activity Tolerance:  [x] Patient tolerated treatment well [] Patient limited by fatigue  [] Patient limited by pain  [] Patient limited by other medical complications  [] Other:     Prognosis: [x] Good [] Fair  [] Poor    Patient Requires Follow-up: [x] Yes  [] No    Plan:   [x] Continue per plan of care [] Alter current plan (see comments)  [] Plan of care initiated [] Hold pending MD visit [] Discharge    Plan for Next Session:    Progress gait training and standing balance in // bars with no UE support    Electronically signed by:  Sole Norris, PT

## 2018-10-29 ENCOUNTER — HOSPITAL ENCOUNTER (OUTPATIENT)
Dept: PHYSICAL THERAPY | Age: 68
Setting detail: THERAPIES SERIES
Discharge: HOME | End: 2018-10-29
Payer: MEDICARE

## 2018-10-29 PROCEDURE — G0283 ELEC STIM OTHER THAN WOUND: HCPCS

## 2018-10-29 PROCEDURE — 97110 THERAPEUTIC EXERCISES: CPT

## 2018-11-05 ENCOUNTER — HOSPITAL ENCOUNTER (OUTPATIENT)
Dept: PHYSICAL THERAPY | Age: 68
Setting detail: THERAPIES SERIES
Discharge: HOME | End: 2018-11-05
Payer: MEDICARE

## 2018-11-05 PROCEDURE — 97110 THERAPEUTIC EXERCISES: CPT

## 2018-11-05 PROCEDURE — 97530 THERAPEUTIC ACTIVITIES: CPT

## 2018-11-05 PROCEDURE — 97116 GAIT TRAINING THERAPY: CPT

## 2018-11-05 NOTE — FLOWSHEET NOTE
transfers independently. Last day at [facility] he ambulated 50 feet with RW and CGA from PT. Pt states his left leg has been getting stronger, left leg is still weak. Subjective:     10/01/18: Patient reports he has been using rolling walker to get around instead of wheelchair. 10/3/18: States he is doing good, legs get tired on him occasionally but he just takes a break  10/08/18: Patient reports his legs doing good,only occasional fatigue in right leg. 10/10/18: Patient reports knee were  a little sore yesterday. 10/15/18: Patient reports legs are getting stronger. States he still don't have much sensation below the knees. 10/17/18: States things are going good- know his limitations at home. Trouble reaching down for ADLs  10/22/18: Pt states he is doing good, had to go up and down 22 steps over the weekend. Was able to do it with 1 HR.  10/29/18: Patient reports he is able to go up and down the steps easier. States he was able to cut the grass last week. 11/5/18: Doing good, was able to bend in the yard to do some landscaping.  He used the 4WW to get himself up-little difficult    Objective:  Observation:   10/3/18: Pt came to PT clinic with 4WW        Test measurements:           11/5/18 Knee Extension MMT: R 4-/5, L 4/5    Exercises:  Exercise/Equipment Resistance/Repetitions Other comments   // Bars   Mini Squats 2 x 20 reps    Nu Step X 10 min, Level 5    Seated LAQ 2 X 20 B with assist- 5#  on right, 10# on Left    // Bars  DLB on airex x 2 min with perturbations  DLB with minisquats on airexTandem Balance x 1 min B on airex    6 inch    Fwd step up and down  2 x 10  Fwd side step  2 x 20  Wobble board 2 min  Talia Disc balance 2 min  Steady   GaitPt ambulated with SPC and CGA  x 200 feet                  HEP      Supine Quad and Glut set, bridging  5/22/18   Seated HS STretch  5/22/18                    Other Therapeutic Activities:    8/6/18: Discussed possibilities of pt ambulating into bathroom

## 2018-11-12 ENCOUNTER — HOSPITAL ENCOUNTER (OUTPATIENT)
Dept: PHYSICAL THERAPY | Age: 68
Setting detail: THERAPIES SERIES
Discharge: HOME | End: 2018-11-12
Payer: MEDICARE

## 2018-11-12 PROCEDURE — 97530 THERAPEUTIC ACTIVITIES: CPT

## 2018-11-12 PROCEDURE — 97110 THERAPEUTIC EXERCISES: CPT

## 2018-11-12 PROCEDURE — 97116 GAIT TRAINING THERAPY: CPT

## 2018-11-12 NOTE — FLOWSHEET NOTE
Physical Therapy Daily Treatment Note  Date:  2018    Patient Name:  Satish Torres    :  1950  MRN: [id number]     Restrictions/Precautions:    B quad weakness- R 2/ and L 3/. 18 had lumbar surgery to attempt to straighten back by connecting prior S1 - L1 fusion to T8- 13 hour surgery. Pt seems to have nerve palsy from laying prone which has caused B quad weakness. MD said she regain strength quickly    Pertinent Medical History:   OA, HTN, Restless Leg, SOB, Back Surgery- multiple fusions prior to current one. Cervical Fusion. L TKR. L shoulder surgery. R shoulder surgery    Medical/Treatment Diagnosis Information:  · Diagnosis: B LE weakness  · Treatment Diagnosis: B LE weakness, impaired standing balance and ability to ambulate    Insurance/Certification information: PT Insurance Information: [employer]. Physician Information:  Referring Practitioner: Dr. Ad Rodriguez of care signed (Y/N):    Visit# / total visits:     + +  +   Pain level: 0-1/10     G-Code (if applicable):      Date / Visit # G-Code Applied:  18  PT G-Codes  Functional Assessment Tool Used: PT assessment  Functional Limitation: Mobility: Walking and moving around  Mobility: Walking and Moving Around Current Status (): At least 80 percent but less than 100 percent impaired, limited or restricted  Mobility: Walking and Moving Around Goal Status (): At least 1 percent but less than 20 percent impaired, limited or restricted    Progress Note: [x]  Yes  []  No  Next due by: Visit #10      History of Injury:  Pt states he had lumbar surgery on 18 and 18 to do S1 disectomy and then to correct S1 to L5 fusion by connecting it to T8 to improve posture. Pt was having left leg pain from his knee down that was really bad, leading to the surgery. Currently, 0-1/10 pain in low back, nothing in LE. Pt states he was discharged from [facility] on 18.  At that time he was doing

## 2018-11-19 ENCOUNTER — HOSPITAL ENCOUNTER (OUTPATIENT)
Dept: PHYSICAL THERAPY | Age: 68
Setting detail: THERAPIES SERIES
Discharge: HOME | End: 2018-11-19
Payer: MEDICARE

## 2018-11-19 PROCEDURE — G0283 ELEC STIM OTHER THAN WOUND: HCPCS

## 2018-11-19 PROCEDURE — 97110 THERAPEUTIC EXERCISES: CPT

## 2018-11-26 ENCOUNTER — HOSPITAL ENCOUNTER (OUTPATIENT)
Dept: PHYSICAL THERAPY | Age: 68
Setting detail: THERAPIES SERIES
Discharge: HOME | End: 2018-11-26
Payer: MEDICARE

## 2018-11-26 PROCEDURE — 97530 THERAPEUTIC ACTIVITIES: CPT

## 2018-11-26 PROCEDURE — 97110 THERAPEUTIC EXERCISES: CPT

## 2018-11-26 PROCEDURE — 97116 GAIT TRAINING THERAPY: CPT

## 2018-12-03 ENCOUNTER — HOSPITAL ENCOUNTER (OUTPATIENT)
Dept: PHYSICAL THERAPY | Age: 68
Setting detail: THERAPIES SERIES
Discharge: HOME | End: 2018-12-03
Payer: MEDICARE

## 2018-12-03 PROCEDURE — 97110 THERAPEUTIC EXERCISES: CPT

## 2018-12-03 PROCEDURE — 97530 THERAPEUTIC ACTIVITIES: CPT

## 2018-12-10 ENCOUNTER — HOSPITAL ENCOUNTER (OUTPATIENT)
Dept: PHYSICAL THERAPY | Age: 68
Setting detail: THERAPIES SERIES
Discharge: HOME | End: 2018-12-10
Payer: MEDICARE

## 2018-12-10 PROCEDURE — 97530 THERAPEUTIC ACTIVITIES: CPT

## 2018-12-10 PROCEDURE — 97110 THERAPEUTIC EXERCISES: CPT

## 2018-12-10 PROCEDURE — G0283 ELEC STIM OTHER THAN WOUND: HCPCS

## 2018-12-10 NOTE — FLOWSHEET NOTE
doing transfers independently. Last day at [facility] he ambulated 50 feet with RW and CGA from PT. Pt states his left leg has been getting stronger, left leg is still weak. Subjective:     10/29/18: Patient reports he is able to go up and down the steps easier. States he was able to cut the grass last week. 11/5/18: Doing good, was able to bend in the yard to do some landscaping. He used the 4WW to get himself up-little difficult  11/12/18: Pt states he is doing good, left leg is still weak and he has trouble getting up from kneeling position. Has been using the [facility]  11/19/18: Patient reports knee has been doing ok,he has been using SPC and able to get up from a toilet easier. 11/26/18: Doing good, left leg is still weaker. Was able to get on /off a commode that was raised without UE support  12/3/18: Pt states he is doing good. Able to go up and down the steps reciprocally with HR. Going down his R leg still feels a little unsteady going down the steps  12/10/18: Pt states he is doing better. Able to bend down to the floor then stand up with his cane.     Objective:  Observation:   10/3/18: Pt came to PT clinic with 4WW        Test measurements:           11/5/18 Knee Extension MMT: R 4-/5, L 4/5    Exercises:  Exercise/Equipment Resistance/Repetitions Other comments   // Bars   Mini Squats 2 x 20 reps    Nu Step X 10 min, Level 6    Seated LAQ 2 X 20 B with assist- 5#  on right, 10# on Left    // Bars  DLB on airex x 2 min with perturbations  DLB with minisquats on airexTandem Balance x 1 min B on airex    6 inch    Fwd step up and down  2 x 10  Fwd side step    Fwd lunge and Bwd lunge x 20 B with no UE support    Wobble board 2 min  Steady   Gait                  HEP      Supine Quad and Glut set, bridging  5/22/18   Seated HS STretch  5/22/18                    Other Therapeutic Activities:    8/6/18: Discussed possibilities of pt ambulating into bathroom with RW and what equipment/modifications he might

## 2019-01-08 ENCOUNTER — HOSPITAL ENCOUNTER (OUTPATIENT)
Dept: PHYSICAL THERAPY | Age: 69
Setting detail: THERAPIES SERIES
Discharge: HOME | End: 2019-01-08
Payer: MEDICARE

## 2019-01-08 PROCEDURE — 97530 THERAPEUTIC ACTIVITIES: CPT

## 2019-01-08 PROCEDURE — 97110 THERAPEUTIC EXERCISES: CPT

## 2019-01-14 ENCOUNTER — HOSPITAL ENCOUNTER (OUTPATIENT)
Dept: PHYSICAL THERAPY | Age: 69
Setting detail: THERAPIES SERIES
Discharge: HOME | End: 2019-01-14
Payer: MEDICARE

## 2019-01-14 PROCEDURE — 97110 THERAPEUTIC EXERCISES: CPT

## 2019-01-14 PROCEDURE — 97530 THERAPEUTIC ACTIVITIES: CPT

## 2019-01-15 ENCOUNTER — APPOINTMENT (OUTPATIENT)
Dept: PHYSICAL THERAPY | Age: 69
End: 2019-01-15
Payer: MEDICARE

## 2019-01-21 ENCOUNTER — APPOINTMENT (OUTPATIENT)
Dept: PHYSICAL THERAPY | Age: 69
End: 2019-01-21
Payer: MEDICARE

## 2019-01-22 ENCOUNTER — APPOINTMENT (OUTPATIENT)
Dept: PHYSICAL THERAPY | Age: 69
End: 2019-01-22
Payer: MEDICARE

## 2019-01-28 ENCOUNTER — APPOINTMENT (OUTPATIENT)
Dept: PHYSICAL THERAPY | Age: 69
End: 2019-01-28
Payer: MEDICARE

## 2019-01-29 ENCOUNTER — APPOINTMENT (OUTPATIENT)
Dept: PHYSICAL THERAPY | Age: 69
End: 2019-01-29
Payer: MEDICARE

## 2023-04-03 ENCOUNTER — HOSPITAL ENCOUNTER (OUTPATIENT)
Age: 73
Discharge: HOME | End: 2023-04-03
Payer: MEDICARE

## 2023-04-03 ENCOUNTER — HOSPITAL ENCOUNTER (OUTPATIENT)
Dept: GENERAL RADIOLOGY | Age: 73
Discharge: HOME | End: 2023-04-03
Payer: MEDICARE

## 2023-04-03 DIAGNOSIS — M54.16 LUMBAR RADICULOPATHY: ICD-10-CM

## 2023-04-03 DIAGNOSIS — M54.14 THORACIC RADICULOPATHY: ICD-10-CM

## 2023-04-03 PROCEDURE — 72074 X-RAY EXAM THORAC SPINE4/>VW: CPT

## 2023-04-03 PROCEDURE — 72110 X-RAY EXAM L-2 SPINE 4/>VWS: CPT

## 2024-03-07 ENCOUNTER — APPOINTMENT (OUTPATIENT)
Dept: CT IMAGING | Age: 74
DRG: 460 | End: 2024-03-07
Payer: MEDICARE

## 2024-03-07 ENCOUNTER — HOSPITAL ENCOUNTER (INPATIENT)
Age: 74
LOS: 6 days | Discharge: NURSING, REHAB OR LONG-TERM CARE | DRG: 460 | End: 2024-03-13
Attending: STUDENT IN AN ORGANIZED HEALTH CARE EDUCATION/TRAINING PROGRAM | Admitting: FAMILY MEDICINE
Payer: MEDICARE

## 2024-03-07 DIAGNOSIS — R26.2 DIFFICULTY WALKING: Primary | ICD-10-CM

## 2024-03-07 DIAGNOSIS — M48.04 SPINAL STENOSIS, THORACIC: ICD-10-CM

## 2024-03-07 LAB
ANION GAP SERPL CALCULATED.3IONS-SCNC: 14 MMOL/L (ref 3–16)
BASOPHILS # BLD: 0.1 K/UL (ref 0–0.2)
BASOPHILS NFR BLD: 0.7 %
BUN SERPL-MCNC: 15 MG/DL (ref 7–20)
CALCIUM SERPL-MCNC: 9.5 MG/DL (ref 8.3–10.6)
CHLORIDE SERPL-SCNC: 104 MMOL/L (ref 99–110)
CO2 SERPL-SCNC: 26 MMOL/L (ref 21–32)
CREAT SERPL-MCNC: 1.4 MG/DL (ref 0.8–1.3)
DEPRECATED RDW RBC AUTO: 12.7 % (ref 12.4–15.4)
EOSINOPHIL # BLD: 0.1 K/UL (ref 0–0.6)
EOSINOPHIL NFR BLD: 1.5 %
GFR SERPLBLD CREATININE-BSD FMLA CKD-EPI: 53 ML/MIN/{1.73_M2}
GLUCOSE SERPL-MCNC: 85 MG/DL (ref 70–99)
HCT VFR BLD AUTO: 47.7 % (ref 40.5–52.5)
HGB BLD-MCNC: 16 G/DL (ref 13.5–17.5)
LYMPHOCYTES # BLD: 1.2 K/UL (ref 1–5.1)
LYMPHOCYTES NFR BLD: 13.9 %
MCH RBC QN AUTO: 34.3 PG (ref 26–34)
MCHC RBC AUTO-ENTMCNC: 33.5 G/DL (ref 31–36)
MCV RBC AUTO: 102.4 FL (ref 80–100)
MONOCYTES # BLD: 0.8 K/UL (ref 0–1.3)
MONOCYTES NFR BLD: 9.8 %
NEUTROPHILS # BLD: 6.2 K/UL (ref 1.7–7.7)
NEUTROPHILS NFR BLD: 74.1 %
PLATELET # BLD AUTO: 164 K/UL (ref 135–450)
PMV BLD AUTO: 9.9 FL (ref 5–10.5)
POTASSIUM SERPL-SCNC: 4.1 MMOL/L (ref 3.5–5.1)
RBC # BLD AUTO: 4.66 M/UL (ref 4.2–5.9)
SODIUM SERPL-SCNC: 144 MMOL/L (ref 136–145)
WBC # BLD AUTO: 8.4 K/UL (ref 4–11)

## 2024-03-07 PROCEDURE — 87040 BLOOD CULTURE FOR BACTERIA: CPT

## 2024-03-07 PROCEDURE — 99285 EMERGENCY DEPT VISIT HI MDM: CPT

## 2024-03-07 PROCEDURE — 80048 BASIC METABOLIC PNL TOTAL CA: CPT

## 2024-03-07 PROCEDURE — 36415 COLL VENOUS BLD VENIPUNCTURE: CPT

## 2024-03-07 PROCEDURE — 85025 COMPLETE CBC W/AUTO DIFF WBC: CPT

## 2024-03-07 PROCEDURE — 99223 1ST HOSP IP/OBS HIGH 75: CPT | Performed by: FAMILY MEDICINE

## 2024-03-07 PROCEDURE — 1200000000 HC SEMI PRIVATE

## 2024-03-07 PROCEDURE — 72128 CT CHEST SPINE W/O DYE: CPT

## 2024-03-07 PROCEDURE — 72131 CT LUMBAR SPINE W/O DYE: CPT

## 2024-03-07 RX ORDER — MAGNESIUM SULFATE IN WATER 40 MG/ML
2000 INJECTION, SOLUTION INTRAVENOUS PRN
Status: DISCONTINUED | OUTPATIENT
Start: 2024-03-07 | End: 2024-03-13 | Stop reason: HOSPADM

## 2024-03-07 RX ORDER — CARBIDOPA/LEVODOPA 25MG-250MG
1 TABLET ORAL NIGHTLY
Status: ON HOLD | COMMUNITY

## 2024-03-07 RX ORDER — SODIUM CHLORIDE 9 MG/ML
INJECTION, SOLUTION INTRAVENOUS CONTINUOUS
Status: DISCONTINUED | OUTPATIENT
Start: 2024-03-07 | End: 2024-03-09

## 2024-03-07 RX ORDER — ACETAMINOPHEN 650 MG/1
650 SUPPOSITORY RECTAL EVERY 6 HOURS PRN
Status: DISCONTINUED | OUTPATIENT
Start: 2024-03-07 | End: 2024-03-09

## 2024-03-07 RX ORDER — POTASSIUM CHLORIDE 7.45 MG/ML
10 INJECTION INTRAVENOUS PRN
Status: DISCONTINUED | OUTPATIENT
Start: 2024-03-07 | End: 2024-03-13 | Stop reason: HOSPADM

## 2024-03-07 RX ORDER — OMEPRAZOLE 20 MG/1
40 CAPSULE, DELAYED RELEASE ORAL DAILY
Status: ON HOLD | COMMUNITY

## 2024-03-07 RX ORDER — ACETAMINOPHEN 325 MG/1
650 TABLET ORAL EVERY 6 HOURS PRN
Status: DISCONTINUED | OUTPATIENT
Start: 2024-03-07 | End: 2024-03-09

## 2024-03-07 RX ORDER — SPIRONOLACTONE 25 MG/1
25 TABLET ORAL DAILY
Status: ON HOLD | COMMUNITY

## 2024-03-07 RX ORDER — FLUTICASONE PROPIONATE 50 MCG
1 SPRAY, SUSPENSION (ML) NASAL 2 TIMES DAILY
Status: ON HOLD | COMMUNITY

## 2024-03-07 RX ORDER — AMLODIPINE BESYLATE 5 MG/1
5 TABLET ORAL DAILY
Status: ON HOLD | COMMUNITY

## 2024-03-07 RX ORDER — POTASSIUM CHLORIDE 20 MEQ/1
40 TABLET, EXTENDED RELEASE ORAL PRN
Status: DISCONTINUED | OUTPATIENT
Start: 2024-03-07 | End: 2024-03-13 | Stop reason: HOSPADM

## 2024-03-07 RX ORDER — OXYCODONE HYDROCHLORIDE 5 MG/1
5 TABLET ORAL EVERY 4 HOURS PRN
Status: DISCONTINUED | OUTPATIENT
Start: 2024-03-07 | End: 2024-03-09

## 2024-03-07 RX ORDER — VITAMIN B COMPLEX
1 CAPSULE ORAL DAILY
Status: ON HOLD | COMMUNITY

## 2024-03-07 RX ORDER — TRAZODONE HYDROCHLORIDE 100 MG/1
100 TABLET ORAL NIGHTLY
Status: ON HOLD | COMMUNITY

## 2024-03-07 RX ORDER — SODIUM CHLORIDE 0.9 % (FLUSH) 0.9 %
5-40 SYRINGE (ML) INJECTION EVERY 12 HOURS SCHEDULED
Status: DISCONTINUED | OUTPATIENT
Start: 2024-03-07 | End: 2024-03-09

## 2024-03-07 RX ORDER — SODIUM CHLORIDE 0.9 % (FLUSH) 0.9 %
5-40 SYRINGE (ML) INJECTION PRN
Status: DISCONTINUED | OUTPATIENT
Start: 2024-03-07 | End: 2024-03-09

## 2024-03-07 RX ORDER — CETIRIZINE HYDROCHLORIDE 5 MG/1
5 TABLET ORAL DAILY
Status: ON HOLD | COMMUNITY

## 2024-03-07 RX ORDER — SODIUM CHLORIDE 9 MG/ML
INJECTION, SOLUTION INTRAVENOUS PRN
Status: DISCONTINUED | OUTPATIENT
Start: 2024-03-07 | End: 2024-03-09

## 2024-03-07 RX ORDER — ONDANSETRON 4 MG/1
4 TABLET, ORALLY DISINTEGRATING ORAL EVERY 8 HOURS PRN
Status: DISCONTINUED | OUTPATIENT
Start: 2024-03-07 | End: 2024-03-09 | Stop reason: SDUPTHER

## 2024-03-07 RX ORDER — ENOXAPARIN SODIUM 100 MG/ML
30 INJECTION SUBCUTANEOUS 2 TIMES DAILY
Status: DISCONTINUED | OUTPATIENT
Start: 2024-03-07 | End: 2024-03-09

## 2024-03-07 RX ORDER — AZELASTINE 1 MG/ML
1 SPRAY, METERED NASAL 2 TIMES DAILY
Status: ON HOLD | COMMUNITY

## 2024-03-07 RX ORDER — ROPINIROLE 1 MG/1
3 TABLET, FILM COATED ORAL 3 TIMES DAILY
Status: ON HOLD | COMMUNITY

## 2024-03-07 RX ORDER — POLYETHYLENE GLYCOL 3350 17 G/17G
17 POWDER, FOR SOLUTION ORAL DAILY PRN
Status: DISCONTINUED | OUTPATIENT
Start: 2024-03-07 | End: 2024-03-09

## 2024-03-07 RX ORDER — FLUTICASONE PROPIONATE AND SALMETEROL XINAFOATE 230; 21 UG/1; UG/1
2 AEROSOL, METERED RESPIRATORY (INHALATION) 2 TIMES DAILY
Status: ON HOLD | COMMUNITY

## 2024-03-07 RX ORDER — ONDANSETRON 2 MG/ML
4 INJECTION INTRAMUSCULAR; INTRAVENOUS EVERY 6 HOURS PRN
Status: DISCONTINUED | OUTPATIENT
Start: 2024-03-07 | End: 2024-03-09 | Stop reason: SDUPTHER

## 2024-03-07 RX ORDER — GABAPENTIN 600 MG/1
600 TABLET ORAL 4 TIMES DAILY
Status: ON HOLD | COMMUNITY

## 2024-03-07 RX ORDER — HYDROMORPHONE HYDROCHLORIDE 1 MG/ML
1 INJECTION, SOLUTION INTRAMUSCULAR; INTRAVENOUS; SUBCUTANEOUS
Status: DISCONTINUED | OUTPATIENT
Start: 2024-03-07 | End: 2024-03-09

## 2024-03-07 ASSESSMENT — PAIN - FUNCTIONAL ASSESSMENT: PAIN_FUNCTIONAL_ASSESSMENT: NONE - DENIES PAIN

## 2024-03-07 ASSESSMENT — LIFESTYLE VARIABLES
HOW OFTEN DO YOU HAVE A DRINK CONTAINING ALCOHOL: NEVER
HOW MANY STANDARD DRINKS CONTAINING ALCOHOL DO YOU HAVE ON A TYPICAL DAY: PATIENT DOES NOT DRINK

## 2024-03-08 ENCOUNTER — APPOINTMENT (OUTPATIENT)
Dept: CT IMAGING | Age: 74
DRG: 460 | End: 2024-03-08
Payer: MEDICARE

## 2024-03-08 ENCOUNTER — ANESTHESIA EVENT (OUTPATIENT)
Dept: OPERATING ROOM | Age: 74
End: 2024-03-08
Payer: MEDICARE

## 2024-03-08 ENCOUNTER — APPOINTMENT (OUTPATIENT)
Dept: GENERAL RADIOLOGY | Age: 74
DRG: 460 | End: 2024-03-08
Payer: MEDICARE

## 2024-03-08 LAB
ABO + RH BLD: NORMAL
ALBUMIN SERPL-MCNC: 3.7 G/DL (ref 3.4–5)
ALBUMIN/GLOB SERPL: 1.3 {RATIO} (ref 1.1–2.2)
ALP SERPL-CCNC: 97 U/L (ref 40–129)
ALT SERPL-CCNC: 30 U/L (ref 10–40)
ANION GAP SERPL CALCULATED.3IONS-SCNC: 14 MMOL/L (ref 3–16)
AST SERPL-CCNC: 42 U/L (ref 15–37)
BASOPHILS # BLD: 0 K/UL (ref 0–0.2)
BASOPHILS NFR BLD: 0.3 %
BILIRUB SERPL-MCNC: 0.5 MG/DL (ref 0–1)
BLD GP AB SCN SERPL QL: NORMAL
BUN SERPL-MCNC: 14 MG/DL (ref 7–20)
CALCIUM SERPL-MCNC: 9.2 MG/DL (ref 8.3–10.6)
CHLORIDE SERPL-SCNC: 107 MMOL/L (ref 99–110)
CO2 SERPL-SCNC: 21 MMOL/L (ref 21–32)
CREAT SERPL-MCNC: 1.3 MG/DL (ref 0.8–1.3)
DEPRECATED RDW RBC AUTO: 12.8 % (ref 12.4–15.4)
EOSINOPHIL # BLD: 0.1 K/UL (ref 0–0.6)
EOSINOPHIL NFR BLD: 0.9 %
GFR SERPLBLD CREATININE-BSD FMLA CKD-EPI: 57 ML/MIN/{1.73_M2}
GLUCOSE SERPL-MCNC: 99 MG/DL (ref 70–99)
HCT VFR BLD AUTO: 46.6 % (ref 40.5–52.5)
HGB BLD-MCNC: 16 G/DL (ref 13.5–17.5)
INR PPP: 1.05 (ref 0.84–1.16)
LYMPHOCYTES # BLD: 0.7 K/UL (ref 1–5.1)
LYMPHOCYTES NFR BLD: 7.9 %
MCH RBC QN AUTO: 34.2 PG (ref 26–34)
MCHC RBC AUTO-ENTMCNC: 34.3 G/DL (ref 31–36)
MCV RBC AUTO: 99.8 FL (ref 80–100)
MONOCYTES # BLD: 0.3 K/UL (ref 0–1.3)
MONOCYTES NFR BLD: 3.5 %
NEUTROPHILS # BLD: 7.6 K/UL (ref 1.7–7.7)
NEUTROPHILS NFR BLD: 87.4 %
PLATELET # BLD AUTO: 153 K/UL (ref 135–450)
PMV BLD AUTO: 10.1 FL (ref 5–10.5)
POTASSIUM SERPL-SCNC: 4.9 MMOL/L (ref 3.5–5.1)
PROT SERPL-MCNC: 6.5 G/DL (ref 6.4–8.2)
PROTHROMBIN TIME: 13.7 SEC (ref 11.5–14.8)
RBC # BLD AUTO: 4.67 M/UL (ref 4.2–5.9)
SODIUM SERPL-SCNC: 142 MMOL/L (ref 136–145)
WBC # BLD AUTO: 8.7 K/UL (ref 4–11)

## 2024-03-08 PROCEDURE — 85025 COMPLETE CBC W/AUTO DIFF WBC: CPT

## 2024-03-08 PROCEDURE — 85610 PROTHROMBIN TIME: CPT

## 2024-03-08 PROCEDURE — 6360000002 HC RX W HCPCS: Performed by: FAMILY MEDICINE

## 2024-03-08 PROCEDURE — 86900 BLOOD TYPING SEROLOGIC ABO: CPT

## 2024-03-08 PROCEDURE — 36415 COLL VENOUS BLD VENIPUNCTURE: CPT

## 2024-03-08 PROCEDURE — 6370000000 HC RX 637 (ALT 250 FOR IP): Performed by: FAMILY MEDICINE

## 2024-03-08 PROCEDURE — 2500000003 HC RX 250 WO HCPCS: Performed by: NURSE PRACTITIONER

## 2024-03-08 PROCEDURE — 72128 CT CHEST SPINE W/O DYE: CPT

## 2024-03-08 PROCEDURE — 6360000004 HC RX CONTRAST MEDICATION: Performed by: NURSE PRACTITIONER

## 2024-03-08 PROCEDURE — 2709999900 FL MYELOGRAM 2 OR MORE REGIONS VIA LUMB INJ

## 2024-03-08 PROCEDURE — 80053 COMPREHEN METABOLIC PANEL: CPT

## 2024-03-08 PROCEDURE — 1200000000 HC SEMI PRIVATE

## 2024-03-08 PROCEDURE — 86850 RBC ANTIBODY SCREEN: CPT

## 2024-03-08 PROCEDURE — APPNB45 APP NON BILLABLE 31-45 MINUTES: Performed by: NURSE PRACTITIONER

## 2024-03-08 PROCEDURE — 2580000003 HC RX 258: Performed by: FAMILY MEDICINE

## 2024-03-08 PROCEDURE — 86901 BLOOD TYPING SEROLOGIC RH(D): CPT

## 2024-03-08 PROCEDURE — 72131 CT LUMBAR SPINE W/O DYE: CPT

## 2024-03-08 RX ORDER — IOPAMIDOL 408 MG/ML
13 INJECTION, SOLUTION INTRATHECAL
Status: COMPLETED | OUTPATIENT
Start: 2024-03-08 | End: 2024-03-08

## 2024-03-08 RX ORDER — DEXAMETHASONE SODIUM PHOSPHATE 4 MG/ML
4 INJECTION, SOLUTION INTRA-ARTICULAR; INTRALESIONAL; INTRAMUSCULAR; INTRAVENOUS; SOFT TISSUE EVERY 6 HOURS
Status: DISCONTINUED | OUTPATIENT
Start: 2024-03-08 | End: 2024-03-09

## 2024-03-08 RX ORDER — LIDOCAINE HYDROCHLORIDE 10 MG/ML
5 INJECTION, SOLUTION EPIDURAL; INFILTRATION; INTRACAUDAL; PERINEURAL ONCE
Status: COMPLETED | OUTPATIENT
Start: 2024-03-08 | End: 2024-03-08

## 2024-03-08 RX ORDER — ATORVASTATIN CALCIUM 40 MG/1
40 TABLET, FILM COATED ORAL DAILY
Status: DISCONTINUED | OUTPATIENT
Start: 2024-03-08 | End: 2024-03-13 | Stop reason: HOSPADM

## 2024-03-08 RX ORDER — CARBIDOPA/LEVODOPA 25MG-250MG
1 TABLET ORAL NIGHTLY
Status: DISCONTINUED | OUTPATIENT
Start: 2024-03-08 | End: 2024-03-13 | Stop reason: HOSPADM

## 2024-03-08 RX ORDER — FLUTICASONE PROPIONATE 50 MCG
1 SPRAY, SUSPENSION (ML) NASAL 2 TIMES DAILY
Status: DISCONTINUED | OUTPATIENT
Start: 2024-03-08 | End: 2024-03-13 | Stop reason: HOSPADM

## 2024-03-08 RX ORDER — GABAPENTIN 600 MG/1
600 TABLET ORAL 4 TIMES DAILY
Status: DISCONTINUED | OUTPATIENT
Start: 2024-03-08 | End: 2024-03-13 | Stop reason: HOSPADM

## 2024-03-08 RX ADMIN — OXYCODONE 5 MG: 5 TABLET ORAL at 02:35

## 2024-03-08 RX ADMIN — GABAPENTIN 600 MG: 600 TABLET, FILM COATED ORAL at 20:06

## 2024-03-08 RX ADMIN — SODIUM CHLORIDE: 9 INJECTION, SOLUTION INTRAVENOUS at 01:14

## 2024-03-08 RX ADMIN — ROPINIROLE 3 MG: 2 TABLET, FILM COATED ORAL at 08:39

## 2024-03-08 RX ADMIN — DEXAMETHASONE SODIUM PHOSPHATE 4 MG: 4 INJECTION, SOLUTION INTRAMUSCULAR; INTRAVENOUS at 04:02

## 2024-03-08 RX ADMIN — DEXAMETHASONE SODIUM PHOSPHATE 4 MG: 4 INJECTION, SOLUTION INTRAMUSCULAR; INTRAVENOUS at 18:24

## 2024-03-08 RX ADMIN — ROPINIROLE 3 MG: 2 TABLET, FILM COATED ORAL at 20:06

## 2024-03-08 RX ADMIN — ENOXAPARIN SODIUM 30 MG: 100 INJECTION SUBCUTANEOUS at 01:11

## 2024-03-08 RX ADMIN — GABAPENTIN 600 MG: 600 TABLET, FILM COATED ORAL at 18:24

## 2024-03-08 RX ADMIN — SODIUM CHLORIDE, PRESERVATIVE FREE 10 ML: 5 INJECTION INTRAVENOUS at 08:44

## 2024-03-08 RX ADMIN — DEXAMETHASONE SODIUM PHOSPHATE 4 MG: 4 INJECTION, SOLUTION INTRAMUSCULAR; INTRAVENOUS at 08:39

## 2024-03-08 RX ADMIN — ENOXAPARIN SODIUM 30 MG: 100 INJECTION SUBCUTANEOUS at 08:39

## 2024-03-08 RX ADMIN — IOPAMIDOL 13 ML: 408 INJECTION, SOLUTION INTRATHECAL at 15:52

## 2024-03-08 RX ADMIN — GABAPENTIN 600 MG: 600 TABLET, FILM COATED ORAL at 08:39

## 2024-03-08 RX ADMIN — SODIUM CHLORIDE, PRESERVATIVE FREE 10 ML: 5 INJECTION INTRAVENOUS at 20:09

## 2024-03-08 RX ADMIN — CARBIDOPA AND LEVODOPA 1 TABLET: 25; 250 TABLET ORAL at 20:07

## 2024-03-08 RX ADMIN — DEXAMETHASONE SODIUM PHOSPHATE 4 MG: 4 INJECTION, SOLUTION INTRAMUSCULAR; INTRAVENOUS at 20:07

## 2024-03-08 RX ADMIN — LIDOCAINE HYDROCHLORIDE 5 ML: 10 INJECTION, SOLUTION EPIDURAL; INFILTRATION; INTRACAUDAL; PERINEURAL at 15:53

## 2024-03-08 RX ADMIN — ROPINIROLE 3 MG: 2 TABLET, FILM COATED ORAL at 15:12

## 2024-03-08 RX ADMIN — ATORVASTATIN CALCIUM 40 MG: 40 TABLET, FILM COATED ORAL at 08:39

## 2024-03-08 ASSESSMENT — PAIN - FUNCTIONAL ASSESSMENT: PAIN_FUNCTIONAL_ASSESSMENT: PREVENTS OR INTERFERES SOME ACTIVE ACTIVITIES AND ADLS

## 2024-03-08 ASSESSMENT — PAIN DESCRIPTION - DESCRIPTORS: DESCRIPTORS: CRAMPING

## 2024-03-08 ASSESSMENT — PAIN DESCRIPTION - LOCATION: LOCATION: LEG

## 2024-03-08 ASSESSMENT — PAIN SCALES - GENERAL
PAINLEVEL_OUTOF10: 0
PAINLEVEL_OUTOF10: 2
PAINLEVEL_OUTOF10: 4

## 2024-03-08 ASSESSMENT — PAIN DESCRIPTION - ONSET: ONSET: ON-GOING

## 2024-03-08 ASSESSMENT — ENCOUNTER SYMPTOMS: SHORTNESS OF BREATH: 1

## 2024-03-08 ASSESSMENT — PAIN DESCRIPTION - ORIENTATION: ORIENTATION: RIGHT;LEFT

## 2024-03-08 ASSESSMENT — PAIN DESCRIPTION - FREQUENCY: FREQUENCY: INTERMITTENT

## 2024-03-08 ASSESSMENT — PAIN DESCRIPTION - PAIN TYPE: TYPE: ACUTE PAIN

## 2024-03-08 NOTE — ANESTHESIA PRE PROCEDURE
Applicable): No results found for: \"COVID19\"        Anesthesia Evaluation  Patient summary reviewed and Nursing notes reviewed   no history of anesthetic complications:   Airway: Mallampati: II  TM distance: >3 FB   Neck ROM: full  Mouth opening: > = 3 FB   Dental:          Pulmonary:   (+)   shortness of breath:   sleep apnea: on CPAP,       asthma:                            Cardiovascular:    (+) hypertension:                  Neuro/Psych:   Negative Neuro/Psych ROS              GI/Hepatic/Renal: Neg GI/Hepatic/Renal ROS            Endo/Other: Negative Endo/Other ROS                    Abdominal:             Vascular: negative vascular ROS.         Other Findings:       Anesthesia Plan      general and TIVA     ASA 3     (73-year-old male presents for THORACIC 6 - THORACIC 12 EXTENSION OF PREVIOUS SPINAL FUSION TO THORACIC 4 WITH DECOMPRESSION.  Plan general anesthesia via TIVA with ASA standard monitors.  Questions answered.  Patient agreeable with anesthetic plan.  )  Induction: intravenous.      Anesthetic plan and risks discussed with patient.                    KAVIN HERRMANN MD   3/8/2024

## 2024-03-08 NOTE — PLAN OF CARE
Problem: Discharge Planning  Goal: Discharge to home or other facility with appropriate resources  Outcome: Progressing     Problem: Safety - Adult  Goal: Free from fall injury  Outcome: Progressing  Note: Fall precautions in place. Bed alarm on, bed in lowest positon, call light within reach, non slip socks, hourly rounding.      Problem: ABCDS Injury Assessment  Goal: Absence of physical injury  Outcome: Progressing

## 2024-03-08 NOTE — PLAN OF CARE
Per care everywhere, patient is s/p placement of Debteye SCS on 8/28/23 By Dr. Solis at [place]. Patient unsure if his SCS is MRI compatible. Patient reports that he is going to have his wife bring in his SCS card, remote and  to the hospital later this afternoon. Discussed patient case with Imelda, MRI technician. She reported she would try to assist in finding if patient SCS is compatible for MRI or not, since we do not have his card here presently. If patient is unable to obtain MRI, then can obtain CT myelogram for further evaluation of his spine.

## 2024-03-08 NOTE — PLAN OF CARE
Problem: Safety - Adult  Goal: Free from fall injury  3/8/2024 1047 by Tari Yang RN  Outcome: Progressing  Note: Patient will remain fall free during stay by implementing and following all safety precautions.      Problem: Pain  Goal: Verbalizes/displays adequate comfort level or baseline comfort level  Outcome: Progressing  Flowsheets (Taken 3/8/2024 1047)  Verbalizes/displays adequate comfort level or baseline comfort level:   Encourage patient to monitor pain and request assistance   Administer analgesics based on type and severity of pain and evaluate response   Assess pain using appropriate pain scale  Note: Patient will have pain managed by utilizing both pharmacological and non pharmacological interventions.

## 2024-03-08 NOTE — ED NOTES
ED TO INPATIENT SBAR HANDOFF    Patient Name: Marko Bermudez   :  1950  73 y.o.   MRN:  [id number]  Preferred Name    ED Room #:  B17/B17-17  Family/Caregiver Present yes   Restraints no   Sitter no   Sepsis Risk Score Sepsis Risk Score: 0.75    Situation  Code Status: Full Code No additional code details.    Allergies: Patient has no known allergies.  Weight: Patient Vitals for the past 96 hrs (Last 3 readings):   Weight   24 2109 127.2 kg (280 lb 8 oz)     Arrived from: home  Chief Complaint:   Chief Complaint   Patient presents with    trouble walking     Pt. Reports he has been having trouble walking since , pt. Has been working with neurologist at [facility]. Pt. Reports they told him it was most likely his bulging disc and if he continued to get worse to come to ER. Pt reports no feeling in bilateral legs. History of neuropathy     Hospital Problem/Diagnosis:  Principal Problem:    Ambulatory dysfunction  Resolved Problems:    * No resolved hospital problems. *    Imaging:   CT LUMBAR SPINE WO CONTRAST   Final Result      1.  T8-9 severe disc degeneration and facet arthropathy immediately above the level spinal fusion results in moderate to severe spinal canal stenosis.   2.  Lucency around the tip of the left transsacroiliac screws in right S1 transpedicular screw likely reflective of hardware loosening.   3.  4.0 x 4.7 x 8.4 cm subcutaneous fluid collection in the back. This may reflect a postoperative seroma.   4.  No acute fracture or traumatic subluxation.      Electronically signed by Jono Valladares MD      CT THORACIC SPINE WO CONTRAST   Final Result      1.  T8-9 severe disc degeneration and facet arthropathy immediately above the level spinal fusion results in moderate to severe spinal canal stenosis.   2.  Lucency around the tip of the left transsacroiliac screws in right S1 transpedicular screw likely reflective of hardware loosening.   3.  4.0 x 4.7 x 8.4 cm subcutaneous fluid

## 2024-03-08 NOTE — ED PROVIDER NOTES
ED Attending Attestation Note     Date of evaluation: 3/7/2024    This patient was seen by the advance practice provider.  I have seen and examined the patient, agree with the workup, evaluation, management and diagnosis. The care plan has been discussed.  My assessment reveals a 73-year-old male who is presenting for acute on chronic worsening bilateral lower extremity weakness and numbness.  Patient endorses a history of chronic neuropathy in the feet secondary to a prior spinal surgery, previously only involved his feet and lower legs but has progressed over the last 2 weeks to involve his anterior thighs.  Additionally, he was able to walk independently until 2 weeks ago and is now at the point that he is falling even with a walker.  He does not have any pain and has not had any recent illnesses or fevers.  He denies any loss of bowel or bladder or other symptoms of cauda equina.  He does follow with [place] neurology who apparently were going to order an outpatient CT T and L-spine.  Patient cannot have an MRI due to a spinal cord stimulator.    Gen: Alert, awake, non toxic appearing   Head: NCAT  Eyes: PERRL, EOMI  Neck: Supple, full ROM  Cardiac: RRR, no murmurs, rubs or gallops  Lungs: No respiratory distress, lungs CTAB  Abd: Soft, non distended, non tender to palpation  Extremities: No deformities, warm and well perfused, strong peripheral pulses  Neuro: Alert and oriented x 3, no cranial nerve deficits, normal speech and normal coordination in the upper extremities.  On strength testing of the lower extremities, he has 5/5 strength in the big toes, ankles bilaterally.  He has 4/5 on hip flexion, knee flexion and knee extension.  He is able to perform a straight leg raise bilaterally and weakly raise his knees towards his chest.  He has subjective decrease sensation in the bilateral legs from the knee down to the toes bilaterally.  Psych: Appropriate mood and affect       Tres Dai MD  03/07/24

## 2024-03-08 NOTE — H&P
Hospital Medicine History & Physical      Date of Admission: 3/7/2024    Date of Service:  Pt seen/examined on 3/7/2024    [x]Admitted to Inpatient with expected LOS greater than two midnights due to medical therapy.  []Placed in Observation status.    Chief Admission Complaint: Back pain ambulatory dysfunction    Presenting Admission History:      73 y.o. male who presented to [facility] with ambulatory dysfunction.  PMHx significant for previous spinal fusion with neuropathy hypertension diabetes.      States has chronic back pain however the last 2 weeks has acutely worsened, extreme pain progressively worsening walking and now is in a wheelchair, states legs are weak and painful.  Reports his legs are jerking uncontrollably.  Reports numbness.  Denies saddle paresthesia denies loss of bladder or bowel function.  Has a spinal stimulator that he states is incompatible with an MRI.  Denies chest pain chest pressure abdominal pain nausea vomiting    In the ER  Creatinine 1.4    CT T/L-spine shows T8-9 severe disc degeneration and facet arthropathy immediately above the level spinal fusion results in moderate to severe spinal canal stenosis./ Lucency around the tip of the left transsacroiliac screws in right S1 transpedicular screw likely reflective of hardware loosening./4.0 x 4.7 x 8.4 cm subcutaneous fluid collection in the back. This may reflect a postoperative seroma./No acute fracture or traumatic subluxation.    Assessment/Plan:      Current Principal Problem:  Ambulatory dysfunction    Myoclonus/ambulatory dysfunction/spinal hardware malfunction/decreased reflexes/back pain  -Neurosurgery consult  -Decadron  -Pain control  -Continue home gabapentin    Continue home Requip Sinemet Lipitor    Held home blood pressure medications due to soft blood pressures may restart if elevated    Discussed management of the case in detail w/ the Emergency Department Provider:     CXR: I have reviewed the CXR with the

## 2024-03-08 NOTE — ED PROVIDER NOTES
[facility]  EMERGENCY DEPARTMENT ENCOUNTER          PHYSICIAN ASSISTANT NOTE       Date of evaluation: 3/7/2024    Chief Complaint     trouble walking (Pt. Reports he has been having trouble walking since 2/26, pt. Has been working with neurologist at [facility]. Pt. Reports they told him it was most likely his bulging disc and if he continued to get worse to come to ER. Pt reports no feeling in bilateral legs. History of neuropathy)      History of Present Illness     Marko Bermudez is a 73 y.o. male who presents with trouble walking.  Patient endorses history of spinal fusion from T9-S1 in 2018.  Has baseline neuropathy secondary to this surgery from the knees distal.  Reports spinal cord stimulator placement in 2023 for ongoing pain to the right leg, the symptoms have overall resolved.  Reports approximately 2 weeks ago, began having increased sensation changes to the bilateral thighs anteriorly.  Also has been having difficulty walking.  Typically does not require any assistive devices, however has had to begin using a walker and is now using a wheelchair due to multiple falls with a walker.  He has been seen by [facility] neurology who recommended CT imaging (cannot have MRI due to spinal cord stimulator).  However he has had worsening symptoms and was unable to get up from the ground after a fall, prompting presentation to the ER.  He denies any back pain whatsoever.  No injury or trauma to the back itself.  No bowel or bladder incontinence, no perineal numbness.    ASSESSMENT / PLAN  (MEDICAL DECISION MAKING)     INITIAL VITALS: BP: 137/67, Temp: 97.6 °F (36.4 °C), Pulse: 83, Respirations: 18, SpO2: 96 %    Marko Bermudez is a 73 y.o. male presenting with difficulty walking.  Patient is hemodynamically stable on arrival.  He is overall very well-appearing and in no acute distress.  However, he did require wheelchair transportation into the emergency department due to inability to walk

## 2024-03-09 ENCOUNTER — ANESTHESIA (OUTPATIENT)
Dept: OPERATING ROOM | Age: 74
End: 2024-03-09
Payer: MEDICARE

## 2024-03-09 ENCOUNTER — APPOINTMENT (OUTPATIENT)
Dept: GENERAL RADIOLOGY | Age: 74
DRG: 460 | End: 2024-03-09
Payer: MEDICARE

## 2024-03-09 LAB
ALBUMIN SERPL-MCNC: 3.7 G/DL (ref 3.4–5)
ALBUMIN/GLOB SERPL: 1.2 {RATIO} (ref 1.1–2.2)
ALP SERPL-CCNC: 105 U/L (ref 40–129)
ALT SERPL-CCNC: 19 U/L (ref 10–40)
ANION GAP SERPL CALCULATED.3IONS-SCNC: 13 MMOL/L (ref 3–16)
AST SERPL-CCNC: 53 U/L (ref 15–37)
BASOPHILS # BLD: 0 K/UL (ref 0–0.2)
BASOPHILS NFR BLD: 0.2 %
BILIRUB SERPL-MCNC: 0.7 MG/DL (ref 0–1)
BUN SERPL-MCNC: 25 MG/DL (ref 7–20)
CALCIUM SERPL-MCNC: 9.7 MG/DL (ref 8.3–10.6)
CHLORIDE SERPL-SCNC: 105 MMOL/L (ref 99–110)
CO2 SERPL-SCNC: 22 MMOL/L (ref 21–32)
CREAT SERPL-MCNC: 1.2 MG/DL (ref 0.8–1.3)
DEPRECATED RDW RBC AUTO: 13.1 % (ref 12.4–15.4)
EOSINOPHIL # BLD: 0 K/UL (ref 0–0.6)
EOSINOPHIL NFR BLD: 0.1 %
GFR SERPLBLD CREATININE-BSD FMLA CKD-EPI: >60 ML/MIN/{1.73_M2}
GLUCOSE SERPL-MCNC: 124 MG/DL (ref 70–99)
HCT VFR BLD AUTO: 46.7 % (ref 40.5–52.5)
HGB BLD-MCNC: 16.5 G/DL (ref 13.5–17.5)
LYMPHOCYTES # BLD: 0.6 K/UL (ref 1–5.1)
LYMPHOCYTES NFR BLD: 4.5 %
MCH RBC QN AUTO: 35.3 PG (ref 26–34)
MCHC RBC AUTO-ENTMCNC: 35.2 G/DL (ref 31–36)
MCV RBC AUTO: 100.1 FL (ref 80–100)
MONOCYTES # BLD: 0.4 K/UL (ref 0–1.3)
MONOCYTES NFR BLD: 2.9 %
NEUTROPHILS # BLD: 13 K/UL (ref 1.7–7.7)
NEUTROPHILS NFR BLD: 92.3 %
PLATELET # BLD AUTO: 164 K/UL (ref 135–450)
PMV BLD AUTO: 10.1 FL (ref 5–10.5)
POTASSIUM SERPL-SCNC: 4.6 MMOL/L (ref 3.5–5.1)
PROT SERPL-MCNC: 6.8 G/DL (ref 6.4–8.2)
RBC # BLD AUTO: 4.67 M/UL (ref 4.2–5.9)
SODIUM SERPL-SCNC: 140 MMOL/L (ref 136–145)
WBC # BLD AUTO: 14.1 K/UL (ref 4–11)

## 2024-03-09 PROCEDURE — 94761 N-INVAS EAR/PLS OXIMETRY MLT: CPT

## 2024-03-09 PROCEDURE — 2580000003 HC RX 258: Performed by: NEUROLOGICAL SURGERY

## 2024-03-09 PROCEDURE — 3700000001 HC ADD 15 MINUTES (ANESTHESIA): Performed by: NEUROLOGICAL SURGERY

## 2024-03-09 PROCEDURE — 2580000003 HC RX 258: Performed by: STUDENT IN AN ORGANIZED HEALTH CARE EDUCATION/TRAINING PROGRAM

## 2024-03-09 PROCEDURE — 6370000000 HC RX 637 (ALT 250 FOR IP): Performed by: NEUROLOGICAL SURGERY

## 2024-03-09 PROCEDURE — 00UT0KZ SUPPLEMENT SPINAL MENINGES WITH NONAUTOLOGOUS TISSUE SUBSTITUTE, OPEN APPROACH: ICD-10-PCS | Performed by: NEUROLOGICAL SURGERY

## 2024-03-09 PROCEDURE — 2500000003 HC RX 250 WO HCPCS: Performed by: STUDENT IN AN ORGANIZED HEALTH CARE EDUCATION/TRAINING PROGRAM

## 2024-03-09 PROCEDURE — 00PV0MZ REMOVAL OF NEUROSTIMULATOR LEAD FROM SPINAL CORD, OPEN APPROACH: ICD-10-PCS | Performed by: NEUROLOGICAL SURGERY

## 2024-03-09 PROCEDURE — 7100000001 HC PACU RECOVERY - ADDTL 15 MIN: Performed by: NEUROLOGICAL SURGERY

## 2024-03-09 PROCEDURE — C1713 ANCHOR/SCREW BN/BN,TIS/BN: HCPCS | Performed by: NEUROLOGICAL SURGERY

## 2024-03-09 PROCEDURE — 80053 COMPREHEN METABOLIC PANEL: CPT

## 2024-03-09 PROCEDURE — 7100000000 HC PACU RECOVERY - FIRST 15 MIN: Performed by: NEUROLOGICAL SURGERY

## 2024-03-09 PROCEDURE — 2700000000 HC OXYGEN THERAPY PER DAY

## 2024-03-09 PROCEDURE — 8E0WXBZ COMPUTER ASSISTED PROCEDURE OF TRUNK REGION: ICD-10-PCS | Performed by: NEUROLOGICAL SURGERY

## 2024-03-09 PROCEDURE — P9045 ALBUMIN (HUMAN), 5%, 250 ML: HCPCS | Performed by: STUDENT IN AN ORGANIZED HEALTH CARE EDUCATION/TRAINING PROGRAM

## 2024-03-09 PROCEDURE — 2500000003 HC RX 250 WO HCPCS: Performed by: NEUROLOGICAL SURGERY

## 2024-03-09 PROCEDURE — 6360000002 HC RX W HCPCS: Performed by: STUDENT IN AN ORGANIZED HEALTH CARE EDUCATION/TRAINING PROGRAM

## 2024-03-09 PROCEDURE — 03HY32Z INSERTION OF MONITORING DEVICE INTO UPPER ARTERY, PERCUTANEOUS APPROACH: ICD-10-PCS | Performed by: ANESTHESIOLOGY

## 2024-03-09 PROCEDURE — 2060000000 HC ICU INTERMEDIATE R&B

## 2024-03-09 PROCEDURE — 6360000002 HC RX W HCPCS: Performed by: ANESTHESIOLOGY

## 2024-03-09 PROCEDURE — C9290 INJ, BUPIVACAINE LIPOSOME: HCPCS | Performed by: NEUROLOGICAL SURGERY

## 2024-03-09 PROCEDURE — 94660 CPAP INITIATION&MGMT: CPT

## 2024-03-09 PROCEDURE — 6360000002 HC RX W HCPCS: Performed by: NEUROLOGICAL SURGERY

## 2024-03-09 PROCEDURE — 0RG70K1 FUSION OF 2 TO 7 THORACIC VERTEBRAL JOINTS WITH NONAUTOLOGOUS TISSUE SUBSTITUTE, POSTERIOR APPROACH, POSTERIOR COLUMN, OPEN APPROACH: ICD-10-PCS | Performed by: NEUROLOGICAL SURGERY

## 2024-03-09 PROCEDURE — 0JPT0MZ REMOVAL OF STIMULATOR GENERATOR FROM TRUNK SUBCUTANEOUS TISSUE AND FASCIA, OPEN APPROACH: ICD-10-PCS | Performed by: NEUROLOGICAL SURGERY

## 2024-03-09 PROCEDURE — 00NX0ZZ RELEASE THORACIC SPINAL CORD, OPEN APPROACH: ICD-10-PCS | Performed by: NEUROLOGICAL SURGERY

## 2024-03-09 PROCEDURE — 3600000004 HC SURGERY LEVEL 4 BASE: Performed by: NEUROLOGICAL SURGERY

## 2024-03-09 PROCEDURE — 72070 X-RAY EXAM THORAC SPINE 2VWS: CPT

## 2024-03-09 PROCEDURE — 3600000014 HC SURGERY LEVEL 4 ADDTL 15MIN: Performed by: NEUROLOGICAL SURGERY

## 2024-03-09 PROCEDURE — 6370000000 HC RX 637 (ALT 250 FOR IP): Performed by: STUDENT IN AN ORGANIZED HEALTH CARE EDUCATION/TRAINING PROGRAM

## 2024-03-09 PROCEDURE — 85025 COMPLETE CBC W/AUTO DIFF WBC: CPT

## 2024-03-09 PROCEDURE — 36415 COLL VENOUS BLD VENIPUNCTURE: CPT

## 2024-03-09 PROCEDURE — 2580000003 HC RX 258: Performed by: FAMILY MEDICINE

## 2024-03-09 PROCEDURE — 0PP404Z REMOVAL OF INTERNAL FIXATION DEVICE FROM THORACIC VERTEBRA, OPEN APPROACH: ICD-10-PCS | Performed by: NEUROLOGICAL SURGERY

## 2024-03-09 PROCEDURE — 3700000000 HC ANESTHESIA ATTENDED CARE: Performed by: NEUROLOGICAL SURGERY

## 2024-03-09 PROCEDURE — C1763 CONN TISS, NON-HUMAN: HCPCS | Performed by: NEUROLOGICAL SURGERY

## 2024-03-09 PROCEDURE — 6360000002 HC RX W HCPCS: Performed by: FAMILY MEDICINE

## 2024-03-09 PROCEDURE — A4217 STERILE WATER/SALINE, 500 ML: HCPCS | Performed by: NEUROLOGICAL SURGERY

## 2024-03-09 PROCEDURE — 6370000000 HC RX 637 (ALT 250 FOR IP): Performed by: FAMILY MEDICINE

## 2024-03-09 PROCEDURE — 2709999900 HC NON-CHARGEABLE SUPPLY: Performed by: NEUROLOGICAL SURGERY

## 2024-03-09 PROCEDURE — 2720000010 HC SURG SUPPLY STERILE: Performed by: NEUROLOGICAL SURGERY

## 2024-03-09 PROCEDURE — 94640 AIRWAY INHALATION TREATMENT: CPT

## 2024-03-09 DEVICE — SCREW SPNL L45MM DIA6.5MM POST THORACOLUMBOSACRAL POLYAX 2S: Type: IMPLANTABLE DEVICE | Site: BACK | Status: FUNCTIONAL

## 2024-03-09 DEVICE — BONE GRAFT KIT 7510400 INFUSE MEDIUM
Type: IMPLANTABLE DEVICE | Site: BACK | Status: FUNCTIONAL
Brand: INFUSE® BONE GRAFT

## 2024-03-09 DEVICE — SCREW SPNL DIA5.5MM OPN TULIP LOK RELINE: Type: IMPLANTABLE DEVICE | Site: BACK | Status: FUNCTIONAL

## 2024-03-09 DEVICE — ALLOGRAFT BNE CUBE 30 CC FD CANC: Type: IMPLANTABLE DEVICE | Site: BACK | Status: FUNCTIONAL

## 2024-03-09 DEVICE — DURAGEN® PLUS DURAL REGENERATION MATRIX, 3 IN X 3 IN (7.5 CM X 7.5 CM)
Type: IMPLANTABLE DEVICE | Site: BACK | Status: FUNCTIONAL
Brand: DURAGEN® PLUS

## 2024-03-09 DEVICE — SCREW SPNL L50MM DIA6.5MM POST THORACOLUMBOSACRAL POLYAX 2S: Type: IMPLANTABLE DEVICE | Site: BACK | Status: FUNCTIONAL

## 2024-03-09 DEVICE — IMPLANTABLE DEVICE: Type: IMPLANTABLE DEVICE | Site: BACK | Status: FUNCTIONAL

## 2024-03-09 DEVICE — SCREW SPNL L40MM DIA5.5MM POST THORACOLUMBOSACRAL POLYAX 2S: Type: IMPLANTABLE DEVICE | Site: BACK | Status: FUNCTIONAL

## 2024-03-09 RX ORDER — SUCCINYLCHOLINE/SOD CL,ISO/PF 200MG/10ML
SYRINGE (ML) INTRAVENOUS PRN
Status: DISCONTINUED | OUTPATIENT
Start: 2024-03-09 | End: 2024-03-09 | Stop reason: SDUPTHER

## 2024-03-09 RX ORDER — NALOXONE HYDROCHLORIDE 0.4 MG/ML
0.2 INJECTION, SOLUTION INTRAMUSCULAR; INTRAVENOUS; SUBCUTANEOUS PRN
Status: DISCONTINUED | OUTPATIENT
Start: 2024-03-09 | End: 2024-03-13 | Stop reason: HOSPADM

## 2024-03-09 RX ORDER — SODIUM CHLORIDE 0.9 % (FLUSH) 0.9 %
5-40 SYRINGE (ML) INJECTION EVERY 12 HOURS SCHEDULED
Status: DISCONTINUED | OUTPATIENT
Start: 2024-03-09 | End: 2024-03-09 | Stop reason: HOSPADM

## 2024-03-09 RX ORDER — SENNA AND DOCUSATE SODIUM 50; 8.6 MG/1; MG/1
1 TABLET, FILM COATED ORAL 2 TIMES DAILY
Status: DISCONTINUED | OUTPATIENT
Start: 2024-03-09 | End: 2024-03-13 | Stop reason: HOSPADM

## 2024-03-09 RX ORDER — ALLOPURINOL 300 MG/1
300 TABLET ORAL DAILY
Status: DISCONTINUED | OUTPATIENT
Start: 2024-03-10 | End: 2024-03-13 | Stop reason: HOSPADM

## 2024-03-09 RX ORDER — ARFORMOTEROL TARTRATE 15 UG/2ML
15 SOLUTION RESPIRATORY (INHALATION)
Status: DISCONTINUED | OUTPATIENT
Start: 2024-03-09 | End: 2024-03-13 | Stop reason: HOSPADM

## 2024-03-09 RX ORDER — DEXAMETHASONE 4 MG/1
4 TABLET ORAL DAILY
Status: DISCONTINUED | OUTPATIENT
Start: 2024-03-12 | End: 2024-03-11

## 2024-03-09 RX ORDER — ENOXAPARIN SODIUM 100 MG/ML
30 INJECTION SUBCUTANEOUS 2 TIMES DAILY
Status: DISCONTINUED | OUTPATIENT
Start: 2024-03-11 | End: 2024-03-13 | Stop reason: HOSPADM

## 2024-03-09 RX ORDER — PROMETHAZINE HYDROCHLORIDE 12.5 MG/1
12.5 TABLET ORAL EVERY 6 HOURS PRN
Status: DISCONTINUED | OUTPATIENT
Start: 2024-03-09 | End: 2024-03-13 | Stop reason: HOSPADM

## 2024-03-09 RX ORDER — CEFAZOLIN SODIUM 1 G/3ML
INJECTION, POWDER, FOR SOLUTION INTRAMUSCULAR; INTRAVENOUS PRN
Status: DISCONTINUED | OUTPATIENT
Start: 2024-03-09 | End: 2024-03-09 | Stop reason: SDUPTHER

## 2024-03-09 RX ORDER — MAGNESIUM HYDROXIDE/ALUMINUM HYDROXICE/SIMETHICONE 120; 1200; 1200 MG/30ML; MG/30ML; MG/30ML
15 SUSPENSION ORAL EVERY 6 HOURS PRN
Status: DISCONTINUED | OUTPATIENT
Start: 2024-03-09 | End: 2024-03-13 | Stop reason: HOSPADM

## 2024-03-09 RX ORDER — DOPAMINE HYDROCHLORIDE 160 MG/100ML
INJECTION, SOLUTION INTRAVENOUS CONTINUOUS PRN
Status: DISCONTINUED | OUTPATIENT
Start: 2024-03-09 | End: 2024-03-09 | Stop reason: SDUPTHER

## 2024-03-09 RX ORDER — ONDANSETRON 2 MG/ML
4 INJECTION INTRAMUSCULAR; INTRAVENOUS EVERY 6 HOURS PRN
Status: DISCONTINUED | OUTPATIENT
Start: 2024-03-09 | End: 2024-03-13 | Stop reason: HOSPADM

## 2024-03-09 RX ORDER — DOXAZOSIN MESYLATE 4 MG/1
4 TABLET ORAL NIGHTLY
Status: DISCONTINUED | OUTPATIENT
Start: 2024-03-09 | End: 2024-03-13 | Stop reason: HOSPADM

## 2024-03-09 RX ORDER — ALBUMIN, HUMAN INJ 5% 5 %
SOLUTION INTRAVENOUS PRN
Status: DISCONTINUED | OUTPATIENT
Start: 2024-03-09 | End: 2024-03-09 | Stop reason: SDUPTHER

## 2024-03-09 RX ORDER — SODIUM CHLORIDE 0.9 % (FLUSH) 0.9 %
5-40 SYRINGE (ML) INJECTION PRN
Status: DISCONTINUED | OUTPATIENT
Start: 2024-03-09 | End: 2024-03-13 | Stop reason: HOSPADM

## 2024-03-09 RX ORDER — METHOCARBAMOL 750 MG/1
750 TABLET, FILM COATED ORAL EVERY 6 HOURS PRN
Status: DISCONTINUED | OUTPATIENT
Start: 2024-03-12 | End: 2024-03-13 | Stop reason: HOSPADM

## 2024-03-09 RX ORDER — SODIUM CHLORIDE 9 MG/ML
INJECTION, SOLUTION INTRAVENOUS CONTINUOUS
Status: DISCONTINUED | OUTPATIENT
Start: 2024-03-09 | End: 2024-03-13 | Stop reason: HOSPADM

## 2024-03-09 RX ORDER — POLYETHYLENE GLYCOL 3350 17 G/17G
17 POWDER, FOR SOLUTION ORAL DAILY
Status: DISCONTINUED | OUTPATIENT
Start: 2024-03-09 | End: 2024-03-13 | Stop reason: HOSPADM

## 2024-03-09 RX ORDER — ONDANSETRON 2 MG/ML
4 INJECTION INTRAMUSCULAR; INTRAVENOUS
Status: DISCONTINUED | OUTPATIENT
Start: 2024-03-09 | End: 2024-03-09 | Stop reason: HOSPADM

## 2024-03-09 RX ORDER — SODIUM CHLORIDE 9 MG/ML
INJECTION, SOLUTION INTRAVENOUS CONTINUOUS PRN
Status: DISCONTINUED | OUTPATIENT
Start: 2024-03-09 | End: 2024-03-09 | Stop reason: SDUPTHER

## 2024-03-09 RX ORDER — ALBUTEROL SULFATE 2.5 MG/3ML
SOLUTION RESPIRATORY (INHALATION)
Status: DISPENSED
Start: 2024-03-09 | End: 2024-03-10

## 2024-03-09 RX ORDER — METHOCARBAMOL 100 MG/ML
INJECTION, SOLUTION INTRAMUSCULAR; INTRAVENOUS PRN
Status: DISCONTINUED | OUTPATIENT
Start: 2024-03-09 | End: 2024-03-09 | Stop reason: SDUPTHER

## 2024-03-09 RX ORDER — SODIUM CHLORIDE, SODIUM LACTATE, POTASSIUM CHLORIDE, CALCIUM CHLORIDE 600; 310; 30; 20 MG/100ML; MG/100ML; MG/100ML; MG/100ML
INJECTION, SOLUTION INTRAVENOUS CONTINUOUS PRN
Status: DISCONTINUED | OUTPATIENT
Start: 2024-03-09 | End: 2024-03-09 | Stop reason: SDUPTHER

## 2024-03-09 RX ORDER — SODIUM CHLORIDE 9 MG/ML
INJECTION, SOLUTION INTRAVENOUS PRN
Status: DISCONTINUED | OUTPATIENT
Start: 2024-03-09 | End: 2024-03-09 | Stop reason: HOSPADM

## 2024-03-09 RX ORDER — LIDOCAINE HYDROCHLORIDE 20 MG/ML
INJECTION, SOLUTION INTRAVENOUS PRN
Status: DISCONTINUED | OUTPATIENT
Start: 2024-03-09 | End: 2024-03-09 | Stop reason: SDUPTHER

## 2024-03-09 RX ORDER — ONDANSETRON 2 MG/ML
INJECTION INTRAMUSCULAR; INTRAVENOUS PRN
Status: DISCONTINUED | OUTPATIENT
Start: 2024-03-09 | End: 2024-03-09 | Stop reason: SDUPTHER

## 2024-03-09 RX ORDER — SODIUM CHLORIDE 9 MG/ML
INJECTION, SOLUTION INTRAVENOUS PRN
Status: DISCONTINUED | OUTPATIENT
Start: 2024-03-09 | End: 2024-03-13 | Stop reason: HOSPADM

## 2024-03-09 RX ORDER — PANTOPRAZOLE SODIUM 40 MG/1
40 TABLET, DELAYED RELEASE ORAL
Status: DISCONTINUED | OUTPATIENT
Start: 2024-03-10 | End: 2024-03-13 | Stop reason: HOSPADM

## 2024-03-09 RX ORDER — PROCHLORPERAZINE EDISYLATE 5 MG/ML
5 INJECTION INTRAMUSCULAR; INTRAVENOUS
Status: COMPLETED | OUTPATIENT
Start: 2024-03-09 | End: 2024-03-09

## 2024-03-09 RX ORDER — NALOXONE HYDROCHLORIDE 0.4 MG/ML
INJECTION, SOLUTION INTRAMUSCULAR; INTRAVENOUS; SUBCUTANEOUS PRN
Status: DISCONTINUED | OUTPATIENT
Start: 2024-03-09 | End: 2024-03-09 | Stop reason: HOSPADM

## 2024-03-09 RX ORDER — HYDROMORPHONE HYDROCHLORIDE 2 MG/ML
INJECTION, SOLUTION INTRAMUSCULAR; INTRAVENOUS; SUBCUTANEOUS PRN
Status: DISCONTINUED | OUTPATIENT
Start: 2024-03-09 | End: 2024-03-09 | Stop reason: SDUPTHER

## 2024-03-09 RX ORDER — PROPOFOL 10 MG/ML
INJECTION, EMULSION INTRAVENOUS PRN
Status: DISCONTINUED | OUTPATIENT
Start: 2024-03-09 | End: 2024-03-09 | Stop reason: SDUPTHER

## 2024-03-09 RX ORDER — BUDESONIDE 0.5 MG/2ML
0.5 INHALANT ORAL
Status: DISCONTINUED | OUTPATIENT
Start: 2024-03-09 | End: 2024-03-13 | Stop reason: HOSPADM

## 2024-03-09 RX ORDER — VANCOMYCIN HYDROCHLORIDE 1 G/20ML
INJECTION, POWDER, LYOPHILIZED, FOR SOLUTION INTRAVENOUS PRN
Status: DISCONTINUED | OUTPATIENT
Start: 2024-03-09 | End: 2024-03-09 | Stop reason: ALTCHOICE

## 2024-03-09 RX ORDER — OXYCODONE HYDROCHLORIDE 5 MG/1
10 TABLET ORAL EVERY 4 HOURS PRN
Status: DISCONTINUED | OUTPATIENT
Start: 2024-03-09 | End: 2024-03-13 | Stop reason: HOSPADM

## 2024-03-09 RX ORDER — ALBUTEROL SULFATE 2.5 MG/3ML
2.5 SOLUTION RESPIRATORY (INHALATION) ONCE
Status: COMPLETED | OUTPATIENT
Start: 2024-03-09 | End: 2024-03-09

## 2024-03-09 RX ORDER — VITAMIN B COMPLEX
1 CAPSULE ORAL DAILY
Status: DISCONTINUED | OUTPATIENT
Start: 2024-03-10 | End: 2024-03-13 | Stop reason: HOSPADM

## 2024-03-09 RX ORDER — DEXAMETHASONE 4 MG/1
4 TABLET ORAL EVERY 12 HOURS SCHEDULED
Status: COMPLETED | OUTPATIENT
Start: 2024-03-10 | End: 2024-03-10

## 2024-03-09 RX ORDER — HYDROMORPHONE HYDROCHLORIDE 1 MG/ML
0.25 INJECTION, SOLUTION INTRAMUSCULAR; INTRAVENOUS; SUBCUTANEOUS EVERY 5 MIN PRN
Status: DISCONTINUED | OUTPATIENT
Start: 2024-03-09 | End: 2024-03-09 | Stop reason: HOSPADM

## 2024-03-09 RX ORDER — DEXAMETHASONE SODIUM PHOSPHATE 4 MG/ML
INJECTION, SOLUTION INTRA-ARTICULAR; INTRALESIONAL; INTRAMUSCULAR; INTRAVENOUS; SOFT TISSUE PRN
Status: DISCONTINUED | OUTPATIENT
Start: 2024-03-09 | End: 2024-03-09 | Stop reason: SDUPTHER

## 2024-03-09 RX ORDER — HYDROMORPHONE HYDROCHLORIDE 1 MG/ML
0.5 INJECTION, SOLUTION INTRAMUSCULAR; INTRAVENOUS; SUBCUTANEOUS
Status: DISCONTINUED | OUTPATIENT
Start: 2024-03-09 | End: 2024-03-11

## 2024-03-09 RX ORDER — DIPHENHYDRAMINE HYDROCHLORIDE 50 MG/ML
12.5 INJECTION INTRAMUSCULAR; INTRAVENOUS
Status: DISCONTINUED | OUTPATIENT
Start: 2024-03-09 | End: 2024-03-09 | Stop reason: HOSPADM

## 2024-03-09 RX ORDER — SODIUM CHLORIDE 9 MG/ML
INJECTION, SOLUTION INTRAVENOUS CONTINUOUS
Status: DISCONTINUED | OUTPATIENT
Start: 2024-03-09 | End: 2024-03-09

## 2024-03-09 RX ORDER — CETIRIZINE HYDROCHLORIDE 10 MG/1
5 TABLET ORAL DAILY PRN
Status: DISCONTINUED | OUTPATIENT
Start: 2024-03-10 | End: 2024-03-13 | Stop reason: HOSPADM

## 2024-03-09 RX ORDER — ACETAMINOPHEN 325 MG/1
650 TABLET ORAL EVERY 4 HOURS PRN
Status: DISCONTINUED | OUTPATIENT
Start: 2024-03-09 | End: 2024-03-13 | Stop reason: HOSPADM

## 2024-03-09 RX ORDER — OXYCODONE HYDROCHLORIDE 5 MG/1
5 TABLET ORAL EVERY 4 HOURS PRN
Status: DISCONTINUED | OUTPATIENT
Start: 2024-03-09 | End: 2024-03-13 | Stop reason: HOSPADM

## 2024-03-09 RX ORDER — DIAZEPAM 5 MG/1
5 TABLET ORAL EVERY 6 HOURS PRN
Status: DISCONTINUED | OUTPATIENT
Start: 2024-03-09 | End: 2024-03-13 | Stop reason: HOSPADM

## 2024-03-09 RX ORDER — FAMOTIDINE 10 MG/ML
INJECTION, SOLUTION INTRAVENOUS PRN
Status: DISCONTINUED | OUTPATIENT
Start: 2024-03-09 | End: 2024-03-09 | Stop reason: SDUPTHER

## 2024-03-09 RX ORDER — HYDROMORPHONE HYDROCHLORIDE 1 MG/ML
1 INJECTION, SOLUTION INTRAMUSCULAR; INTRAVENOUS; SUBCUTANEOUS
Status: DISCONTINUED | OUTPATIENT
Start: 2024-03-09 | End: 2024-03-11

## 2024-03-09 RX ORDER — DEXAMETHASONE 4 MG/1
4 TABLET ORAL EVERY 8 HOURS SCHEDULED
Status: COMPLETED | OUTPATIENT
Start: 2024-03-09 | End: 2024-03-10

## 2024-03-09 RX ORDER — PROPOFOL 10 MG/ML
INJECTION, EMULSION INTRAVENOUS CONTINUOUS PRN
Status: DISCONTINUED | OUTPATIENT
Start: 2024-03-09 | End: 2024-03-09 | Stop reason: SDUPTHER

## 2024-03-09 RX ORDER — BISACODYL 10 MG
10 SUPPOSITORY, RECTAL RECTAL DAILY PRN
Status: DISCONTINUED | OUTPATIENT
Start: 2024-03-09 | End: 2024-03-13 | Stop reason: HOSPADM

## 2024-03-09 RX ORDER — FENTANYL CITRATE 50 UG/ML
INJECTION, SOLUTION INTRAMUSCULAR; INTRAVENOUS
Status: DISPENSED
Start: 2024-03-09 | End: 2024-03-10

## 2024-03-09 RX ORDER — SODIUM CHLORIDE 0.9 % (FLUSH) 0.9 %
5-40 SYRINGE (ML) INJECTION EVERY 12 HOURS SCHEDULED
Status: DISCONTINUED | OUTPATIENT
Start: 2024-03-09 | End: 2024-03-13 | Stop reason: HOSPADM

## 2024-03-09 RX ORDER — TRAZODONE HYDROCHLORIDE 100 MG/1
100 TABLET ORAL NIGHTLY
Status: DISCONTINUED | OUTPATIENT
Start: 2024-03-09 | End: 2024-03-13 | Stop reason: HOSPADM

## 2024-03-09 RX ORDER — FENTANYL CITRATE 50 UG/ML
50 INJECTION, SOLUTION INTRAMUSCULAR; INTRAVENOUS
Status: COMPLETED | OUTPATIENT
Start: 2024-03-09 | End: 2024-03-09

## 2024-03-09 RX ORDER — ROCURONIUM BROMIDE 10 MG/ML
INJECTION, SOLUTION INTRAVENOUS PRN
Status: DISCONTINUED | OUTPATIENT
Start: 2024-03-09 | End: 2024-03-09 | Stop reason: SDUPTHER

## 2024-03-09 RX ORDER — LOSARTAN POTASSIUM 50 MG/1
50 TABLET ORAL DAILY
Status: DISCONTINUED | OUTPATIENT
Start: 2024-03-10 | End: 2024-03-13 | Stop reason: HOSPADM

## 2024-03-09 RX ORDER — HYDROMORPHONE HYDROCHLORIDE 1 MG/ML
0.5 INJECTION, SOLUTION INTRAMUSCULAR; INTRAVENOUS; SUBCUTANEOUS EVERY 5 MIN PRN
Status: DISCONTINUED | OUTPATIENT
Start: 2024-03-09 | End: 2024-03-09 | Stop reason: HOSPADM

## 2024-03-09 RX ORDER — MEPERIDINE HYDROCHLORIDE 25 MG/ML
12.5 INJECTION INTRAMUSCULAR; INTRAVENOUS; SUBCUTANEOUS EVERY 5 MIN PRN
Status: DISCONTINUED | OUTPATIENT
Start: 2024-03-09 | End: 2024-03-09 | Stop reason: HOSPADM

## 2024-03-09 RX ORDER — LIDOCAINE HYDROCHLORIDE ANHYDROUS AND DEXTROSE MONOHYDRATE 5; 400 G/100ML; MG/100ML
INJECTION, SOLUTION INTRAVENOUS CONTINUOUS PRN
Status: DISCONTINUED | OUTPATIENT
Start: 2024-03-09 | End: 2024-03-09 | Stop reason: SDUPTHER

## 2024-03-09 RX ORDER — FENTANYL CITRATE 50 UG/ML
INJECTION, SOLUTION INTRAMUSCULAR; INTRAVENOUS PRN
Status: DISCONTINUED | OUTPATIENT
Start: 2024-03-09 | End: 2024-03-09 | Stop reason: SDUPTHER

## 2024-03-09 RX ORDER — AMLODIPINE BESYLATE 5 MG/1
5 TABLET ORAL DAILY
Status: DISCONTINUED | OUTPATIENT
Start: 2024-03-10 | End: 2024-03-13 | Stop reason: HOSPADM

## 2024-03-09 RX ORDER — SPIRONOLACTONE 25 MG/1
25 TABLET ORAL DAILY
Status: DISCONTINUED | OUTPATIENT
Start: 2024-03-10 | End: 2024-03-13 | Stop reason: HOSPADM

## 2024-03-09 RX ORDER — SODIUM CHLORIDE 0.9 % (FLUSH) 0.9 %
5-40 SYRINGE (ML) INJECTION PRN
Status: DISCONTINUED | OUTPATIENT
Start: 2024-03-09 | End: 2024-03-09 | Stop reason: HOSPADM

## 2024-03-09 RX ADMIN — PROPOFOL 150 MCG/KG/MIN: 10 INJECTION, EMULSION INTRAVENOUS at 11:50

## 2024-03-09 RX ADMIN — TRAZODONE HYDROCHLORIDE 100 MG: 100 TABLET ORAL at 20:22

## 2024-03-09 RX ADMIN — SUGAMMADEX 200 MG: 100 INJECTION, SOLUTION INTRAVENOUS at 12:45

## 2024-03-09 RX ADMIN — METHOCARBAMOL 1000 MG: 100 INJECTION, SOLUTION INTRAMUSCULAR; INTRAVENOUS at 11:55

## 2024-03-09 RX ADMIN — CARBIDOPA AND LEVODOPA 1 TABLET: 25; 250 TABLET ORAL at 20:29

## 2024-03-09 RX ADMIN — FENTANYL CITRATE 100 MCG: 50 INJECTION, SOLUTION INTRAMUSCULAR; INTRAVENOUS at 11:49

## 2024-03-09 RX ADMIN — Medication 160 MG: at 11:49

## 2024-03-09 RX ADMIN — ROPINIROLE 3 MG: 2 TABLET, FILM COATED ORAL at 20:22

## 2024-03-09 RX ADMIN — REMIFENTANIL HYDROCHLORIDE 0.15 MCG/KG/MIN: 1 INJECTION, POWDER, LYOPHILIZED, FOR SOLUTION INTRAVENOUS at 11:50

## 2024-03-09 RX ADMIN — ALBUMIN (HUMAN) 12.5 G: 12.5 SOLUTION INTRAVENOUS at 14:14

## 2024-03-09 RX ADMIN — HYDROMORPHONE HYDROCHLORIDE 0.5 MG: 1 INJECTION, SOLUTION INTRAMUSCULAR; INTRAVENOUS; SUBCUTANEOUS at 15:58

## 2024-03-09 RX ADMIN — GABAPENTIN 600 MG: 600 TABLET, FILM COATED ORAL at 18:00

## 2024-03-09 RX ADMIN — HYDROMORPHONE HYDROCHLORIDE 1 MG: 2 INJECTION, SOLUTION INTRAMUSCULAR; INTRAVENOUS; SUBCUTANEOUS at 12:26

## 2024-03-09 RX ADMIN — LIDOCAINE HYDROCHLORIDE 2 MG/MIN: 4 INJECTION, SOLUTION INTRAVENOUS at 11:50

## 2024-03-09 RX ADMIN — HYDROMORPHONE HYDROCHLORIDE 0.5 MG: 1 INJECTION, SOLUTION INTRAMUSCULAR; INTRAVENOUS; SUBCUTANEOUS at 15:46

## 2024-03-09 RX ADMIN — HYDROMORPHONE HYDROCHLORIDE 1 MG: 1 INJECTION, SOLUTION INTRAMUSCULAR; INTRAVENOUS; SUBCUTANEOUS at 18:00

## 2024-03-09 RX ADMIN — SUGAMMADEX 200 MG: 100 INJECTION, SOLUTION INTRAVENOUS at 12:42

## 2024-03-09 RX ADMIN — ARFORMOTEROL TARTRATE 15 MCG: 15 SOLUTION RESPIRATORY (INHALATION) at 22:07

## 2024-03-09 RX ADMIN — ROCURONIUM BROMIDE 50 MG: 10 INJECTION, SOLUTION INTRAVENOUS at 12:21

## 2024-03-09 RX ADMIN — DEXAMETHASONE 4 MG: 4 TABLET ORAL at 20:22

## 2024-03-09 RX ADMIN — SODIUM CHLORIDE: 9 INJECTION, SOLUTION INTRAVENOUS at 09:04

## 2024-03-09 RX ADMIN — SODIUM CHLORIDE: 9 INJECTION, SOLUTION INTRAVENOUS at 18:04

## 2024-03-09 RX ADMIN — PROPOFOL 120 MG: 10 INJECTION, EMULSION INTRAVENOUS at 11:49

## 2024-03-09 RX ADMIN — DOCUSATE SODIUM 50 MG AND SENNOSIDES 8.6 MG 1 TABLET: 8.6; 5 TABLET, FILM COATED ORAL at 20:22

## 2024-03-09 RX ADMIN — DEXAMETHASONE SODIUM PHOSPHATE 4 MG: 4 INJECTION, SOLUTION INTRAMUSCULAR; INTRAVENOUS at 03:10

## 2024-03-09 RX ADMIN — FENTANYL CITRATE 50 MCG: 50 INJECTION INTRAMUSCULAR; INTRAVENOUS at 15:33

## 2024-03-09 RX ADMIN — SODIUM CHLORIDE, PRESERVATIVE FREE 5 ML: 5 INJECTION INTRAVENOUS at 09:43

## 2024-03-09 RX ADMIN — PROCHLORPERAZINE EDISYLATE 5 MG: 5 INJECTION INTRAMUSCULAR; INTRAVENOUS at 15:40

## 2024-03-09 RX ADMIN — SODIUM CHLORIDE, SODIUM LACTATE, POTASSIUM CHLORIDE, AND CALCIUM CHLORIDE: .6; .31; .03; .02 INJECTION, SOLUTION INTRAVENOUS at 14:13

## 2024-03-09 RX ADMIN — ROPINIROLE 3 MG: 2 TABLET, FILM COATED ORAL at 08:56

## 2024-03-09 RX ADMIN — BUDESONIDE 500 MCG: 0.5 INHALANT RESPIRATORY (INHALATION) at 22:07

## 2024-03-09 RX ADMIN — ALBUTEROL SULFATE 2.5 MG: 2.5 SOLUTION RESPIRATORY (INHALATION) at 15:54

## 2024-03-09 RX ADMIN — GABAPENTIN 600 MG: 600 TABLET, FILM COATED ORAL at 08:57

## 2024-03-09 RX ADMIN — GABAPENTIN 600 MG: 600 TABLET, FILM COATED ORAL at 20:22

## 2024-03-09 RX ADMIN — DEXAMETHASONE 4 MG: 4 TABLET ORAL at 21:49

## 2024-03-09 RX ADMIN — PROPOFOL 80 MG: 10 INJECTION, EMULSION INTRAVENOUS at 12:24

## 2024-03-09 RX ADMIN — ONDANSETRON 4 MG: 2 INJECTION INTRAMUSCULAR; INTRAVENOUS at 11:55

## 2024-03-09 RX ADMIN — HYDROMORPHONE HYDROCHLORIDE 1 MG: 2 INJECTION, SOLUTION INTRAMUSCULAR; INTRAVENOUS; SUBCUTANEOUS at 14:44

## 2024-03-09 RX ADMIN — SODIUM CHLORIDE: 9 INJECTION, SOLUTION INTRAVENOUS at 11:45

## 2024-03-09 RX ADMIN — SODIUM CHLORIDE, SODIUM LACTATE, POTASSIUM CHLORIDE, AND CALCIUM CHLORIDE: .6; .31; .03; .02 INJECTION, SOLUTION INTRAVENOUS at 11:45

## 2024-03-09 RX ADMIN — ROCURONIUM BROMIDE 50 MG: 10 INJECTION, SOLUTION INTRAVENOUS at 12:26

## 2024-03-09 RX ADMIN — DOPAMINE HYDROCHLORIDE IN DEXTROSE 5 MCG/KG/MIN: 1.6 INJECTION, SOLUTION INTRAVENOUS at 12:13

## 2024-03-09 RX ADMIN — ATORVASTATIN CALCIUM 40 MG: 40 TABLET, FILM COATED ORAL at 08:56

## 2024-03-09 RX ADMIN — PROPOFOL 40 MG: 10 INJECTION, EMULSION INTRAVENOUS at 14:51

## 2024-03-09 RX ADMIN — CEFAZOLIN SODIUM 3 G: 1 POWDER, FOR SOLUTION INTRAMUSCULAR; INTRAVENOUS at 11:55

## 2024-03-09 RX ADMIN — OXYCODONE 10 MG: 5 TABLET ORAL at 22:33

## 2024-03-09 RX ADMIN — DEXAMETHASONE SODIUM PHOSPHATE 4 MG: 4 INJECTION, SOLUTION INTRAMUSCULAR; INTRAVENOUS at 08:56

## 2024-03-09 RX ADMIN — FAMOTIDINE 20 MG: 10 INJECTION, SOLUTION INTRAVENOUS at 11:55

## 2024-03-09 RX ADMIN — FLUTICASONE PROPIONATE 1 SPRAY: 50 SPRAY, METERED NASAL at 08:57

## 2024-03-09 RX ADMIN — METHOCARBAMOL 1000 MG: 100 INJECTION, SOLUTION INTRAMUSCULAR; INTRAVENOUS at 18:20

## 2024-03-09 RX ADMIN — FLUTICASONE PROPIONATE 1 SPRAY: 50 SPRAY, METERED NASAL at 20:24

## 2024-03-09 RX ADMIN — DEXAMETHASONE SODIUM PHOSPHATE 8 MG: 4 INJECTION INTRA-ARTICULAR; INTRALESIONAL; INTRAMUSCULAR; INTRAVENOUS; SOFT TISSUE at 11:55

## 2024-03-09 RX ADMIN — LIDOCAINE HYDROCHLORIDE 100 MG: 20 INJECTION, SOLUTION INTRAVENOUS at 11:49

## 2024-03-09 ASSESSMENT — PAIN DESCRIPTION - FREQUENCY: FREQUENCY: CONTINUOUS

## 2024-03-09 ASSESSMENT — PAIN SCALES - GENERAL
PAINLEVEL_OUTOF10: 10
PAINLEVEL_OUTOF10: 7
PAINLEVEL_OUTOF10: 2
PAINLEVEL_OUTOF10: 4
PAINLEVEL_OUTOF10: 5
PAINLEVEL_OUTOF10: 8
PAINLEVEL_OUTOF10: 8
PAINLEVEL_OUTOF10: 4
PAINLEVEL_OUTOF10: 4

## 2024-03-09 ASSESSMENT — PAIN DESCRIPTION - ORIENTATION
ORIENTATION: MID

## 2024-03-09 ASSESSMENT — PAIN DESCRIPTION - LOCATION
LOCATION: BACK

## 2024-03-09 ASSESSMENT — PAIN DESCRIPTION - DESCRIPTORS
DESCRIPTORS: ACHING
DESCRIPTORS: ACHING;DISCOMFORT

## 2024-03-09 ASSESSMENT — PAIN - FUNCTIONAL ASSESSMENT
PAIN_FUNCTIONAL_ASSESSMENT: PREVENTS OR INTERFERES WITH MANY ACTIVE NOT PASSIVE ACTIVITIES
PAIN_FUNCTIONAL_ASSESSMENT: NONE - DENIES PAIN
PAIN_FUNCTIONAL_ASSESSMENT: ACTIVITIES ARE NOT PREVENTED

## 2024-03-09 ASSESSMENT — PAIN DESCRIPTION - ONSET: ONSET: ON-GOING

## 2024-03-09 ASSESSMENT — PAIN DESCRIPTION - PAIN TYPE: TYPE: SURGICAL PAIN

## 2024-03-09 ASSESSMENT — PAIN SCALES - WONG BAKER: WONGBAKER_NUMERICALRESPONSE: 4

## 2024-03-09 NOTE — PLAN OF CARE
Problem: Safety - Adult  Goal: Free from fall injury  3/9/2024 1010 by Soraida Garcia, RN  Outcome: Progressing   Patient has all standard fall precautions in place.      Problem: Pain  Goal: Verbalizes/displays adequate comfort level or baseline comfort level  3/9/2024 1010 by Soraida Garcia, RN  Outcome: Progressing     Pain medications are being Managed by the MAR.      Problem: ABCDS Injury Assessment  Goal: Absence of physical injury  3/9/2024 1010 by Soraida Garcia, RN  Outcome: Progressing   Patient remains stable at this time.

## 2024-03-09 NOTE — ANESTHESIA POSTPROCEDURE EVALUATION
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    Wally Phase I: Wally Score: 8    Wally Phase II:      Anesthesia Post Evaluation    Patient location during evaluation: PACU  Patient participation: complete - patient participated  Level of consciousness: awake and alert  Pain score: 2  Airway patency: patent  Nausea & Vomiting: no nausea and no vomiting  Cardiovascular status: hemodynamically stable  Respiratory status: acceptable  Hydration status: stable  Pain management: adequate and satisfactory to patient    No notable events documented.

## 2024-03-09 NOTE — PLAN OF CARE
Problem: Safety - Adult  Goal: Free from fall injury  3/8/2024 2126 by Carol Enriquez, RN  Outcome: Progressing  Note: Fall precautions in place. Bed alarm on, bed in lowest position, call light within reach, non slip socks, hourly rounding.      Problem: ABCDS Injury Assessment  Goal: Absence of physical injury  Outcome: Progressing     Problem: Pain  Goal: Verbalizes/displays adequate comfort level or baseline comfort level  3/8/2024 2126 by Carol Enriquez, RN  Outcome: Progressing  Note: No complaints of pain thus far.

## 2024-03-09 NOTE — PLAN OF CARE
Problem: Safety - Adult  Goal: Free from fall injury  3/9/2024 1647 by Soraida Garcia RN  Outcome: Progressing  3/9/2024 1010 by Soraida Garcia RN  Outcome: Progressing     Problem: ABCDS Injury Assessment  Goal: Absence of physical injury  3/9/2024 1647 by Soraida Garcia, RN  Outcome: Progressing  3/9/2024 1010 by Soraida Garcia RN  Outcome: Progressing     Problem: Pain  Goal: Verbalizes/displays adequate comfort level or baseline comfort level  3/9/2024 1647 by Soraida Garcia RN  Outcome: Progressing  3/9/2024 1010 by Soraida Garcia RN  Outcome: Progressing

## 2024-03-09 NOTE — OP NOTE
Operative Report    Patient Name: Marko Bermudez  MRN: [id number]  : 1950  DOS: 2024    PRE-OPERATIVE DIAGNOSIS:   1. 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 Severe T8-9 central stenosis with spinal cord compression and thoracic myelopathy    POST-OPERATIVE DIAGNOSIS:   1. 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 Severe T8-9 central stenosis with spinal cord compression and thoracic myelopathy    SURGEON: Danny Wilson M.D., Ph.D.    ASSISTANT SURGEON: Vasu Marie M.D.    Dr. Marie served as assistant on this surgery due to the complex nature of the procedure and the lack of a resident or fellow assistant. He provided assistance with instrumentation, exposure, and closure of the wound.     ANESTHESIA: GETA    ESTIMATED BLOOD LOSS:  150 mL.       OPERATIVE PROCEDURES PERFORMED:  1. T6, T7, and T8 bilateral pedicle screw fixation  2. T8 and T9 total bilateral laminectomies for central decompression  3. T6-T11 bilateral posterolateral fusion with Infuse and bone chips  4. Computer-assisted stereotactic navigation with O-arm.  5. Intraoperative neuromonitoring (SSEP, MEP, EMG)  6. Removal of spinal cord stimulator    INDICATIONS:  Mr. Bermudez is a 73 year old man with previous T9-pelvis fusion and fixation completed in 2018 at an outside facility. He now presented with progressive lower extremitiy weakness, inability to ambulate, and numbness. Imaging demonstrated loosening of his hardware at the pelvis with L5-S1 pseudoarthrosis but also identified severe proximal junctional kyphosis with central stenosis and cord compression. Given his symptoms and the imaging findings, surgical intervention was recommended to extend his fusion and bridge his region of kyphosis. We discussed addressing the L5-S1 interspace but elected to postpone this intervention given the acuity of his cord compression and myelopathy. The patient understands the risks and benefits of the procedure including but not limited to

## 2024-03-09 NOTE — BRIEF OP NOTE
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 Item Serial No.  Lot No. LRB No. Used Action   GRAFT DURA [id number] ULTRAPURE CLLGN ADH BARR MTRX DURAGN + - [id number]  GRAFT DURA [id number] ULTRAPURE CLLGN ADH DILLARD MTRX DURAGN +  [employer]EV[employer]WD [id number] N/A 1 Implanted   ALLOGRAFT BNE CUBE 30 CC FD CANC - [id number]  ALLOGRAFT BNE CUBE 30 CC FD CANC [id number] [employer] HOWM-WD  N/A 1 Implanted   ALLOGRAFT BNE CUBE 30 CC FD CANC - [id number]  ALLOGRAFT BNE CUBE 30 CC FD CANC [id number] [employer] HOWM-WD  N/A 1 Implanted         Drains:   Urinary Catheter 03/09/24 Core[employer] (Active)       Findings: Severe central stenosis at adjacent level      Electronically signed by Danny Wilson MD on 3/9/2024 at 2:40 PM

## 2024-03-09 NOTE — ANESTHESIA PROCEDURE NOTES
Arterial Line:    An arterial line was placed using surface landmarks, in the OR for the following indication(s): continuous blood pressure monitoring.    A 20 gauge (size), 1 and 3/4 inch (length), Arrow (type) catheter was placed, Seldinger technique used, into the left radial artery, secured by tape and Tegaderm and biopatch.  Anesthesia type: General    Events:  patient tolerated procedure well with no complications and EBL < 5mL.3/9/2024 11:53 AM3/9/2024 11:55 AM  Anesthesiologist: Tres Heard DO  Performed: Anesthesiologist   Preanesthetic Checklist  Completed: patient identified, IV checked, site marked, risks and benefits discussed, surgical/procedural consents, equipment checked, pre-op evaluation, timeout performed, anesthesia consent given, oxygen available, monitors applied/VS acknowledged, fire risk safety assessment completed and verbalized and blood product R/B/A discussed and consented

## 2024-03-10 ENCOUNTER — APPOINTMENT (OUTPATIENT)
Dept: GENERAL RADIOLOGY | Age: 74
DRG: 460 | End: 2024-03-10
Payer: MEDICARE

## 2024-03-10 LAB
ANION GAP SERPL CALCULATED.3IONS-SCNC: 15 MMOL/L (ref 3–16)
BUN SERPL-MCNC: 28 MG/DL (ref 7–20)
CALCIUM SERPL-MCNC: 9 MG/DL (ref 8.3–10.6)
CHLORIDE SERPL-SCNC: 105 MMOL/L (ref 99–110)
CO2 SERPL-SCNC: 18 MMOL/L (ref 21–32)
CREAT SERPL-MCNC: 1.3 MG/DL (ref 0.8–1.3)
DEPRECATED RDW RBC AUTO: 12.7 % (ref 12.4–15.4)
GFR SERPLBLD CREATININE-BSD FMLA CKD-EPI: 57 ML/MIN/{1.73_M2}
GLUCOSE SERPL-MCNC: 126 MG/DL (ref 70–99)
HCT VFR BLD AUTO: 41.8 % (ref 40.5–52.5)
HGB BLD-MCNC: 14.1 G/DL (ref 13.5–17.5)
MCH RBC QN AUTO: 34.4 PG (ref 26–34)
MCHC RBC AUTO-ENTMCNC: 33.6 G/DL (ref 31–36)
MCV RBC AUTO: 102.2 FL (ref 80–100)
PLATELET # BLD AUTO: 165 K/UL (ref 135–450)
PMV BLD AUTO: 9.6 FL (ref 5–10.5)
POTASSIUM SERPL-SCNC: 5.8 MMOL/L (ref 3.5–5.1)
RBC # BLD AUTO: 4.09 M/UL (ref 4.2–5.9)
REASON FOR REJECTION: NORMAL
REJECTED TEST: NORMAL
SODIUM SERPL-SCNC: 138 MMOL/L (ref 136–145)
WBC # BLD AUTO: 15.8 K/UL (ref 4–11)

## 2024-03-10 PROCEDURE — 85027 COMPLETE CBC AUTOMATED: CPT

## 2024-03-10 PROCEDURE — 2580000003 HC RX 258: Performed by: NURSE PRACTITIONER

## 2024-03-10 PROCEDURE — 94761 N-INVAS EAR/PLS OXIMETRY MLT: CPT

## 2024-03-10 PROCEDURE — 6360000002 HC RX W HCPCS: Performed by: NEUROLOGICAL SURGERY

## 2024-03-10 PROCEDURE — 94660 CPAP INITIATION&MGMT: CPT

## 2024-03-10 PROCEDURE — 2580000003 HC RX 258: Performed by: NEUROLOGICAL SURGERY

## 2024-03-10 PROCEDURE — 6360000002 HC RX W HCPCS: Performed by: NURSE PRACTITIONER

## 2024-03-10 PROCEDURE — 36415 COLL VENOUS BLD VENIPUNCTURE: CPT

## 2024-03-10 PROCEDURE — 72070 X-RAY EXAM THORAC SPINE 2VWS: CPT

## 2024-03-10 PROCEDURE — 97116 GAIT TRAINING THERAPY: CPT

## 2024-03-10 PROCEDURE — 6370000000 HC RX 637 (ALT 250 FOR IP): Performed by: NEUROLOGICAL SURGERY

## 2024-03-10 PROCEDURE — 97530 THERAPEUTIC ACTIVITIES: CPT

## 2024-03-10 PROCEDURE — 99024 POSTOP FOLLOW-UP VISIT: CPT | Performed by: NURSE PRACTITIONER

## 2024-03-10 PROCEDURE — 6360000002 HC RX W HCPCS: Performed by: STUDENT IN AN ORGANIZED HEALTH CARE EDUCATION/TRAINING PROGRAM

## 2024-03-10 PROCEDURE — 2060000000 HC ICU INTERMEDIATE R&B

## 2024-03-10 PROCEDURE — 6370000000 HC RX 637 (ALT 250 FOR IP): Performed by: NURSE PRACTITIONER

## 2024-03-10 PROCEDURE — 94640 AIRWAY INHALATION TREATMENT: CPT

## 2024-03-10 PROCEDURE — 80048 BASIC METABOLIC PNL TOTAL CA: CPT

## 2024-03-10 PROCEDURE — 97535 SELF CARE MNGMENT TRAINING: CPT

## 2024-03-10 PROCEDURE — 97162 PT EVAL MOD COMPLEX 30 MIN: CPT

## 2024-03-10 PROCEDURE — 97166 OT EVAL MOD COMPLEX 45 MIN: CPT

## 2024-03-10 PROCEDURE — 6370000000 HC RX 637 (ALT 250 FOR IP): Performed by: STUDENT IN AN ORGANIZED HEALTH CARE EDUCATION/TRAINING PROGRAM

## 2024-03-10 PROCEDURE — APPNB30 APP NON BILLABLE TIME 0-30 MINS: Performed by: NURSE PRACTITIONER

## 2024-03-10 RX ORDER — ACETAMINOPHEN 325 MG/1
650 TABLET ORAL EVERY 6 HOURS SCHEDULED
Status: DISCONTINUED | OUTPATIENT
Start: 2024-03-10 | End: 2024-03-11

## 2024-03-10 RX ADMIN — ACETAMINOPHEN 650 MG: 325 TABLET ORAL at 09:12

## 2024-03-10 RX ADMIN — DOCUSATE SODIUM 50 MG AND SENNOSIDES 8.6 MG 1 TABLET: 8.6; 5 TABLET, FILM COATED ORAL at 09:13

## 2024-03-10 RX ADMIN — FLUTICASONE PROPIONATE 1 SPRAY: 50 SPRAY, METERED NASAL at 09:29

## 2024-03-10 RX ADMIN — SODIUM CHLORIDE 2000 MG: 900 INJECTION INTRAVENOUS at 16:28

## 2024-03-10 RX ADMIN — GABAPENTIN 600 MG: 600 TABLET, FILM COATED ORAL at 20:53

## 2024-03-10 RX ADMIN — TRAZODONE HYDROCHLORIDE 100 MG: 100 TABLET ORAL at 20:53

## 2024-03-10 RX ADMIN — ACETAMINOPHEN 650 MG: 325 TABLET ORAL at 22:45

## 2024-03-10 RX ADMIN — GABAPENTIN 600 MG: 600 TABLET, FILM COATED ORAL at 16:21

## 2024-03-10 RX ADMIN — BUDESONIDE 500 MCG: 0.5 INHALANT RESPIRATORY (INHALATION) at 22:22

## 2024-03-10 RX ADMIN — DEXAMETHASONE 4 MG: 4 TABLET ORAL at 13:32

## 2024-03-10 RX ADMIN — BUDESONIDE 500 MCG: 0.5 INHALANT RESPIRATORY (INHALATION) at 10:23

## 2024-03-10 RX ADMIN — PANTOPRAZOLE SODIUM 40 MG: 40 TABLET, DELAYED RELEASE ORAL at 05:55

## 2024-03-10 RX ADMIN — DOXAZOSIN 4 MG: 4 TABLET ORAL at 20:53

## 2024-03-10 RX ADMIN — ARFORMOTEROL TARTRATE 15 MCG: 15 SOLUTION RESPIRATORY (INHALATION) at 22:22

## 2024-03-10 RX ADMIN — SODIUM CHLORIDE 2000 MG: 900 INJECTION INTRAVENOUS at 09:26

## 2024-03-10 RX ADMIN — ROPINIROLE 3 MG: 2 TABLET, FILM COATED ORAL at 13:30

## 2024-03-10 RX ADMIN — OXYCODONE 10 MG: 5 TABLET ORAL at 03:18

## 2024-03-10 RX ADMIN — DIAZEPAM 5 MG: 5 TABLET ORAL at 22:45

## 2024-03-10 RX ADMIN — LOSARTAN POTASSIUM 50 MG: 50 TABLET, FILM COATED ORAL at 09:38

## 2024-03-10 RX ADMIN — AMLODIPINE BESYLATE 5 MG: 5 TABLET ORAL at 09:14

## 2024-03-10 RX ADMIN — FLUTICASONE PROPIONATE 1 SPRAY: 50 SPRAY, METERED NASAL at 20:57

## 2024-03-10 RX ADMIN — ROPINIROLE 3 MG: 2 TABLET, FILM COATED ORAL at 09:13

## 2024-03-10 RX ADMIN — Medication 1 CAPSULE: at 09:39

## 2024-03-10 RX ADMIN — CARBIDOPA AND LEVODOPA 1 TABLET: 25; 250 TABLET ORAL at 20:56

## 2024-03-10 RX ADMIN — POLYETHYLENE GLYCOL 3350 17 G: 17 POWDER, FOR SOLUTION ORAL at 09:14

## 2024-03-10 RX ADMIN — ROPINIROLE 3 MG: 2 TABLET, FILM COATED ORAL at 20:54

## 2024-03-10 RX ADMIN — METHOCARBAMOL 1000 MG: 100 INJECTION, SOLUTION INTRAMUSCULAR; INTRAVENOUS at 10:50

## 2024-03-10 RX ADMIN — METHOCARBAMOL 1000 MG: 100 INJECTION, SOLUTION INTRAMUSCULAR; INTRAVENOUS at 03:19

## 2024-03-10 RX ADMIN — OXYCODONE 5 MG: 5 TABLET ORAL at 20:53

## 2024-03-10 RX ADMIN — ARFORMOTEROL TARTRATE 15 MCG: 15 SOLUTION RESPIRATORY (INHALATION) at 10:23

## 2024-03-10 RX ADMIN — DEXAMETHASONE 4 MG: 4 TABLET ORAL at 20:53

## 2024-03-10 RX ADMIN — SODIUM CHLORIDE 2000 MG: 900 INJECTION INTRAVENOUS at 22:47

## 2024-03-10 RX ADMIN — GABAPENTIN 600 MG: 600 TABLET, FILM COATED ORAL at 13:32

## 2024-03-10 RX ADMIN — SODIUM CHLORIDE, PRESERVATIVE FREE 10 ML: 5 INJECTION INTRAVENOUS at 09:29

## 2024-03-10 RX ADMIN — SPIRONOLACTONE 25 MG: 25 TABLET ORAL at 09:12

## 2024-03-10 RX ADMIN — DOCUSATE SODIUM 50 MG AND SENNOSIDES 8.6 MG 1 TABLET: 8.6; 5 TABLET, FILM COATED ORAL at 20:53

## 2024-03-10 RX ADMIN — GABAPENTIN 600 MG: 600 TABLET, FILM COATED ORAL at 09:14

## 2024-03-10 RX ADMIN — ATORVASTATIN CALCIUM 40 MG: 40 TABLET, FILM COATED ORAL at 09:13

## 2024-03-10 RX ADMIN — ACETAMINOPHEN 650 MG: 325 TABLET ORAL at 13:31

## 2024-03-10 RX ADMIN — OXYCODONE 5 MG: 5 TABLET ORAL at 16:22

## 2024-03-10 RX ADMIN — DEXAMETHASONE 4 MG: 4 TABLET ORAL at 05:55

## 2024-03-10 RX ADMIN — OXYCODONE 5 MG: 5 TABLET ORAL at 07:31

## 2024-03-10 RX ADMIN — ALLOPURINOL 300 MG: 300 TABLET ORAL at 09:14

## 2024-03-10 RX ADMIN — METHOCARBAMOL 1000 MG: 100 INJECTION, SOLUTION INTRAMUSCULAR; INTRAVENOUS at 18:15

## 2024-03-10 RX ADMIN — ACETAMINOPHEN 650 MG: 325 TABLET ORAL at 16:21

## 2024-03-10 ASSESSMENT — PAIN DESCRIPTION - ORIENTATION
ORIENTATION: MID

## 2024-03-10 ASSESSMENT — PAIN DESCRIPTION - PAIN TYPE
TYPE: NEUROPATHIC PAIN
TYPE: SURGICAL PAIN

## 2024-03-10 ASSESSMENT — PAIN DESCRIPTION - LOCATION
LOCATION: BACK

## 2024-03-10 ASSESSMENT — PAIN SCALES - GENERAL
PAINLEVEL_OUTOF10: 8
PAINLEVEL_OUTOF10: 6
PAINLEVEL_OUTOF10: 6
PAINLEVEL_OUTOF10: 2
PAINLEVEL_OUTOF10: 0
PAINLEVEL_OUTOF10: 6
PAINLEVEL_OUTOF10: 0
PAINLEVEL_OUTOF10: 2
PAINLEVEL_OUTOF10: 0

## 2024-03-10 ASSESSMENT — PAIN - FUNCTIONAL ASSESSMENT
PAIN_FUNCTIONAL_ASSESSMENT: ACTIVITIES ARE NOT PREVENTED
PAIN_FUNCTIONAL_ASSESSMENT: PREVENTS OR INTERFERES SOME ACTIVE ACTIVITIES AND ADLS
PAIN_FUNCTIONAL_ASSESSMENT: ACTIVITIES ARE NOT PREVENTED
PAIN_FUNCTIONAL_ASSESSMENT: ACTIVITIES ARE NOT PREVENTED

## 2024-03-10 ASSESSMENT — PAIN DESCRIPTION - FREQUENCY
FREQUENCY: CONTINUOUS
FREQUENCY: INTERMITTENT

## 2024-03-10 ASSESSMENT — PAIN DESCRIPTION - DESCRIPTORS
DESCRIPTORS: ACHING

## 2024-03-10 ASSESSMENT — PAIN DESCRIPTION - ONSET
ONSET: ON-GOING
ONSET: SUDDEN
ONSET: ON-GOING
ONSET: ON-GOING

## 2024-03-10 NOTE — PLAN OF CARE
Problem: Safety - Adult  Goal: Free from fall injury  3/9/2024 1933 by Lachelle Larsen, RN  Outcome: Progressing  Note: All fall precautions in place and call light is within reach.      Problem: Pain  Goal: Verbalizes/displays adequate comfort level or baseline comfort level  3/9/2024 1933 by Lachelle Larsen, RN  Outcome: Progressing  Note: Pt. Managing pain per MAR.

## 2024-03-10 NOTE — PLAN OF CARE
Problem: Safety - Adult  Goal: Free from fall injury  3/10/2024 0825 by Soraida Garcia, RN  Outcome: Progressing   Patient has all standard fall precautions in place.    Problem: ABCDS Injury Assessment  Goal: Absence of physical injury  Outcome: Progressing  Present remains absent of physical injury.     Problem: Pain  Goal: Verbalizes/displays adequate comfort level or baseline comfort level  3/10/2024 0825 by Soraida Garcia, RN  Outcome: Progressing   Pain medications are being managed by the MAR.

## 2024-03-11 LAB
BASOPHILS # BLD: 0 K/UL (ref 0–0.2)
BASOPHILS NFR BLD: 0.2 %
DEPRECATED RDW RBC AUTO: 12.7 % (ref 12.4–15.4)
EOSINOPHIL # BLD: 0 K/UL (ref 0–0.6)
EOSINOPHIL NFR BLD: 0 %
HCT VFR BLD AUTO: 38.6 % (ref 40.5–52.5)
HGB BLD-MCNC: 13 G/DL (ref 13.5–17.5)
LYMPHOCYTES # BLD: 0.5 K/UL (ref 1–5.1)
LYMPHOCYTES NFR BLD: 3.9 %
MCH RBC QN AUTO: 34.2 PG (ref 26–34)
MCHC RBC AUTO-ENTMCNC: 33.7 G/DL (ref 31–36)
MCV RBC AUTO: 101.5 FL (ref 80–100)
MONOCYTES # BLD: 1 K/UL (ref 0–1.3)
MONOCYTES NFR BLD: 7.8 %
NEUTROPHILS # BLD: 11.6 K/UL (ref 1.7–7.7)
NEUTROPHILS NFR BLD: 88.1 %
PLATELET # BLD AUTO: 130 K/UL (ref 135–450)
PMV BLD AUTO: 10.1 FL (ref 5–10.5)
RBC # BLD AUTO: 3.8 M/UL (ref 4.2–5.9)
WBC # BLD AUTO: 13.2 K/UL (ref 4–11)

## 2024-03-11 PROCEDURE — 97530 THERAPEUTIC ACTIVITIES: CPT

## 2024-03-11 PROCEDURE — 99024 POSTOP FOLLOW-UP VISIT: CPT | Performed by: NURSE PRACTITIONER

## 2024-03-11 PROCEDURE — 6370000000 HC RX 637 (ALT 250 FOR IP): Performed by: NURSE PRACTITIONER

## 2024-03-11 PROCEDURE — 36415 COLL VENOUS BLD VENIPUNCTURE: CPT

## 2024-03-11 PROCEDURE — 6370000000 HC RX 637 (ALT 250 FOR IP): Performed by: STUDENT IN AN ORGANIZED HEALTH CARE EDUCATION/TRAINING PROGRAM

## 2024-03-11 PROCEDURE — 97535 SELF CARE MNGMENT TRAINING: CPT

## 2024-03-11 PROCEDURE — 51701 INSERT BLADDER CATHETER: CPT

## 2024-03-11 PROCEDURE — 94640 AIRWAY INHALATION TREATMENT: CPT

## 2024-03-11 PROCEDURE — 6360000002 HC RX W HCPCS: Performed by: NURSE PRACTITIONER

## 2024-03-11 PROCEDURE — 2060000000 HC ICU INTERMEDIATE R&B

## 2024-03-11 PROCEDURE — 97116 GAIT TRAINING THERAPY: CPT

## 2024-03-11 PROCEDURE — 6360000002 HC RX W HCPCS: Performed by: NEUROLOGICAL SURGERY

## 2024-03-11 PROCEDURE — 51798 US URINE CAPACITY MEASURE: CPT

## 2024-03-11 PROCEDURE — 6370000000 HC RX 637 (ALT 250 FOR IP): Performed by: NEUROLOGICAL SURGERY

## 2024-03-11 PROCEDURE — 85025 COMPLETE CBC W/AUTO DIFF WBC: CPT

## 2024-03-11 PROCEDURE — 6360000002 HC RX W HCPCS: Performed by: STUDENT IN AN ORGANIZED HEALTH CARE EDUCATION/TRAINING PROGRAM

## 2024-03-11 PROCEDURE — 2580000003 HC RX 258: Performed by: NURSE PRACTITIONER

## 2024-03-11 PROCEDURE — APPNB45 APP NON BILLABLE 31-45 MINUTES: Performed by: NURSE PRACTITIONER

## 2024-03-11 PROCEDURE — 2580000003 HC RX 258: Performed by: NEUROLOGICAL SURGERY

## 2024-03-11 RX ORDER — POLYETHYLENE GLYCOL 3350 17 G/17G
17 POWDER, FOR SOLUTION ORAL DAILY
Qty: 30 PACKET | Refills: 0 | Status: ON HOLD
Start: 2024-03-12 | End: 2024-04-11

## 2024-03-11 RX ORDER — DEXAMETHASONE 4 MG/1
4 TABLET ORAL DAILY
Status: COMPLETED | OUTPATIENT
Start: 2024-03-11 | End: 2024-03-12

## 2024-03-11 RX ORDER — METHOCARBAMOL 750 MG/1
750 TABLET, FILM COATED ORAL EVERY 6 HOURS PRN
Qty: 40 TABLET | Refills: 0 | Status: ON HOLD
Start: 2024-03-12 | End: 2024-03-22

## 2024-03-11 RX ORDER — ACETAMINOPHEN 500 MG
1000 TABLET ORAL EVERY 6 HOURS SCHEDULED
Status: DISCONTINUED | OUTPATIENT
Start: 2024-03-11 | End: 2024-03-13 | Stop reason: HOSPADM

## 2024-03-11 RX ADMIN — TRAZODONE HYDROCHLORIDE 100 MG: 100 TABLET ORAL at 21:52

## 2024-03-11 RX ADMIN — METHOCARBAMOL 1000 MG: 100 INJECTION, SOLUTION INTRAMUSCULAR; INTRAVENOUS at 02:00

## 2024-03-11 RX ADMIN — DOXAZOSIN 4 MG: 4 TABLET ORAL at 21:52

## 2024-03-11 RX ADMIN — SODIUM CHLORIDE 2000 MG: 900 INJECTION INTRAVENOUS at 16:30

## 2024-03-11 RX ADMIN — SODIUM CHLORIDE 2000 MG: 900 INJECTION INTRAVENOUS at 09:09

## 2024-03-11 RX ADMIN — ENOXAPARIN SODIUM 30 MG: 100 INJECTION SUBCUTANEOUS at 21:54

## 2024-03-11 RX ADMIN — DOCUSATE SODIUM 50 MG AND SENNOSIDES 8.6 MG 1 TABLET: 8.6; 5 TABLET, FILM COATED ORAL at 21:52

## 2024-03-11 RX ADMIN — GABAPENTIN 600 MG: 600 TABLET, FILM COATED ORAL at 21:52

## 2024-03-11 RX ADMIN — ROPINIROLE 3 MG: 2 TABLET, FILM COATED ORAL at 13:21

## 2024-03-11 RX ADMIN — SODIUM CHLORIDE, PRESERVATIVE FREE 10 ML: 5 INJECTION INTRAVENOUS at 21:58

## 2024-03-11 RX ADMIN — DEXAMETHASONE 4 MG: 4 TABLET ORAL at 11:29

## 2024-03-11 RX ADMIN — AMLODIPINE BESYLATE 5 MG: 5 TABLET ORAL at 09:02

## 2024-03-11 RX ADMIN — METHOCARBAMOL 1000 MG: 100 INJECTION, SOLUTION INTRAMUSCULAR; INTRAVENOUS at 11:38

## 2024-03-11 RX ADMIN — OXYCODONE 5 MG: 5 TABLET ORAL at 21:53

## 2024-03-11 RX ADMIN — OXYCODONE 5 MG: 5 TABLET ORAL at 01:21

## 2024-03-11 RX ADMIN — ACETAMINOPHEN 650 MG: 325 TABLET ORAL at 05:43

## 2024-03-11 RX ADMIN — GABAPENTIN 600 MG: 600 TABLET, FILM COATED ORAL at 16:56

## 2024-03-11 RX ADMIN — ARFORMOTEROL TARTRATE 15 MCG: 15 SOLUTION RESPIRATORY (INHALATION) at 20:30

## 2024-03-11 RX ADMIN — ROPINIROLE 3 MG: 2 TABLET, FILM COATED ORAL at 21:52

## 2024-03-11 RX ADMIN — SPIRONOLACTONE 25 MG: 25 TABLET ORAL at 09:03

## 2024-03-11 RX ADMIN — BUDESONIDE 500 MCG: 0.5 INHALANT RESPIRATORY (INHALATION) at 09:41

## 2024-03-11 RX ADMIN — ROPINIROLE 3 MG: 2 TABLET, FILM COATED ORAL at 09:02

## 2024-03-11 RX ADMIN — METHOCARBAMOL 1000 MG: 100 INJECTION, SOLUTION INTRAMUSCULAR; INTRAVENOUS at 18:32

## 2024-03-11 RX ADMIN — GABAPENTIN 600 MG: 600 TABLET, FILM COATED ORAL at 09:03

## 2024-03-11 RX ADMIN — ENOXAPARIN SODIUM 30 MG: 100 INJECTION SUBCUTANEOUS at 09:03

## 2024-03-11 RX ADMIN — Medication 1 CAPSULE: at 09:03

## 2024-03-11 RX ADMIN — ATORVASTATIN CALCIUM 40 MG: 40 TABLET, FILM COATED ORAL at 09:03

## 2024-03-11 RX ADMIN — OXYCODONE 5 MG: 5 TABLET ORAL at 10:09

## 2024-03-11 RX ADMIN — BUDESONIDE 500 MCG: 0.5 INHALANT RESPIRATORY (INHALATION) at 20:30

## 2024-03-11 RX ADMIN — CARBIDOPA AND LEVODOPA 1 TABLET: 25; 250 TABLET ORAL at 21:56

## 2024-03-11 RX ADMIN — ALLOPURINOL 300 MG: 300 TABLET ORAL at 09:02

## 2024-03-11 RX ADMIN — SODIUM CHLORIDE: 9 INJECTION, SOLUTION INTRAVENOUS at 16:23

## 2024-03-11 RX ADMIN — GABAPENTIN 600 MG: 600 TABLET, FILM COATED ORAL at 13:21

## 2024-03-11 RX ADMIN — LOSARTAN POTASSIUM 50 MG: 50 TABLET, FILM COATED ORAL at 09:04

## 2024-03-11 RX ADMIN — OXYCODONE 5 MG: 5 TABLET ORAL at 05:43

## 2024-03-11 RX ADMIN — FLUTICASONE PROPIONATE 1 SPRAY: 50 SPRAY, METERED NASAL at 21:58

## 2024-03-11 RX ADMIN — PANTOPRAZOLE SODIUM 40 MG: 40 TABLET, DELAYED RELEASE ORAL at 05:44

## 2024-03-11 RX ADMIN — ARFORMOTEROL TARTRATE 15 MCG: 15 SOLUTION RESPIRATORY (INHALATION) at 09:41

## 2024-03-11 RX ADMIN — ACETAMINOPHEN 1000 MG: 500 TABLET ORAL at 16:56

## 2024-03-11 RX ADMIN — SODIUM CHLORIDE, PRESERVATIVE FREE 10 ML: 5 INJECTION INTRAVENOUS at 09:03

## 2024-03-11 ASSESSMENT — PAIN DESCRIPTION - FREQUENCY
FREQUENCY: CONTINUOUS

## 2024-03-11 ASSESSMENT — PAIN DESCRIPTION - DESCRIPTORS
DESCRIPTORS: ACHING

## 2024-03-11 ASSESSMENT — PAIN DESCRIPTION - LOCATION
LOCATION: BACK

## 2024-03-11 ASSESSMENT — PAIN DESCRIPTION - ORIENTATION
ORIENTATION: UPPER
ORIENTATION: MID

## 2024-03-11 ASSESSMENT — PAIN SCALES - GENERAL
PAINLEVEL_OUTOF10: 1
PAINLEVEL_OUTOF10: 3
PAINLEVEL_OUTOF10: 1
PAINLEVEL_OUTOF10: 0
PAINLEVEL_OUTOF10: 3
PAINLEVEL_OUTOF10: 2
PAINLEVEL_OUTOF10: 6
PAINLEVEL_OUTOF10: 2
PAINLEVEL_OUTOF10: 6

## 2024-03-11 ASSESSMENT — PAIN DESCRIPTION - ONSET
ONSET: ON-GOING

## 2024-03-11 ASSESSMENT — PAIN DESCRIPTION - PAIN TYPE
TYPE: ACUTE PAIN;SURGICAL PAIN
TYPE: SURGICAL PAIN

## 2024-03-11 ASSESSMENT — PAIN - FUNCTIONAL ASSESSMENT
PAIN_FUNCTIONAL_ASSESSMENT: PREVENTS OR INTERFERES SOME ACTIVE ACTIVITIES AND ADLS
PAIN_FUNCTIONAL_ASSESSMENT: PREVENTS OR INTERFERES SOME ACTIVE ACTIVITIES AND ADLS
PAIN_FUNCTIONAL_ASSESSMENT: ACTIVITIES ARE NOT PREVENTED
PAIN_FUNCTIONAL_ASSESSMENT: ACTIVITIES ARE NOT PREVENTED

## 2024-03-11 NOTE — PLAN OF CARE
Problem: Discharge Planning  Goal: Discharge to home or other facility with appropriate resources  Outcome: Progressing   Pt involved in discharge planning. Barriers to discharge discussed with patient. Discharge learning needs identified. Discuss with patient any additional needed resources and transportation plans. Case management following plan of care.    Problem: Safety - Adult  Goal: Free from fall injury  3/11/2024 0747 by Dilia Yates, RN  Outcome: Progressing   All fall precautions in place. Bed locked and in lowest position with alarm on. Overbed table and personal belonings within reach. Call light within reach and patient instructed to use call light for assistance. Non-skid socks on.    Problem: Pain  Goal: Verbalizes/displays adequate comfort level or baseline comfort level  3/11/2024 0747 by Dilia Yates, RN  Outcome: Progressing   Pt endorsing pain to T spine/ back. Being treated with PRN pain medication, rest, and frequent repositioning with pillow support for comfort and pressure relief. Pt reports some relief from pain with above interventions.

## 2024-03-11 NOTE — CONSULTS
History:   Procedure Laterality Date    BACK SURGERY      three times    BACK SURGERY  may 2013    CARPAL TUNNEL RELEASE      CERVICAL FUSION      COLONOSCOPY      CYSTOSCOPY      HERNIA REPAIR      ventral    KNEE ARTHROPLASTY Left 13    KNEE ARTHROSCOPY      left    KNEE ARTHROSCOPY  [id number]    left    NECK SURGERY      twice    OTHER SURGICAL HISTORY  2013    LEFT SHOULDER ARTHROSCOPY, DEBRIDEMENT, ROTATOR CUFF REPAIR,    ROTATOR CUFF REPAIR      SHOULDER ARTHROSCOPY Right 3/26/14    RIGHT SHOULDER ARTHROSCOPY ; ROTATOR CUFF REPAIR;    SHOULDER SURGERY      bilateral    SHOULDER SURGERY Right 05/10/2017    RIGHT SHOULDER ARTHROSCOPY WITH DEBRIDEMENT ROTATOR CUFF REPAIR    TOE SURGERY      implant right great toe       Social History     Occupational History    Not on file   Tobacco Use    Smoking status: Former     Current packs/day: 0.00     Types: Cigarettes     Quit date: 1970     Years since quittin.6    Smokeless tobacco: Never   Substance and Sexual Activity    Alcohol use: No    Drug use: No    Sexual activity: Not on file        Family History   Problem Relation Age of Onset    Cancer Mother     Heart Attack Father         Outpatient Medications Marked as Taking for the 3/7/24 encounter (Hospital Encounter)   Medication Sig Dispense Refill    fluticasone-salmeterol (ADVAIR HFA) 230-21 MCG/ACT inhaler Inhale 2 puffs into the lungs 2 times daily      amLODIPine (NORVASC) 5 MG tablet Take 1 tablet by mouth daily      azelastine (ASTELIN) 0.1 % nasal spray 1 spray by Nasal route 2 times daily Use in each nostril as directed      b complex vitamins capsule Take 1 capsule by mouth daily      carbidopa-levodopa (SINEMET)  MG per tablet Take 1 tablet by mouth at bedtime      gabapentin (NEURONTIN) 600 MG tablet Take 1 tablet by mouth 4 times daily.      fluticasone (FLONASE) 50 MCG/ACT nasal spray 1 spray by Each Nostril route in the morning and at bedtime      empagliflozin (JARDIANCE) 
KAYDEN  PM&R  3/11/2024  12:03 PM      * This document was created using dictation software.  While all precautions were taken to ensure accuracy, errors may have occurred.  Please disregard any typographical errors.

## 2024-03-12 LAB
BACTERIA BLD CULT ORG #2: NORMAL
BACTERIA BLD CULT: NORMAL
GLUCOSE BLD-MCNC: 145 MG/DL (ref 70–99)
PERFORMED ON: ABNORMAL

## 2024-03-12 PROCEDURE — 94640 AIRWAY INHALATION TREATMENT: CPT

## 2024-03-12 PROCEDURE — 6370000000 HC RX 637 (ALT 250 FOR IP): Performed by: NEUROLOGICAL SURGERY

## 2024-03-12 PROCEDURE — 2580000003 HC RX 258: Performed by: NURSE PRACTITIONER

## 2024-03-12 PROCEDURE — 6370000000 HC RX 637 (ALT 250 FOR IP): Performed by: NURSE PRACTITIONER

## 2024-03-12 PROCEDURE — 6370000000 HC RX 637 (ALT 250 FOR IP): Performed by: STUDENT IN AN ORGANIZED HEALTH CARE EDUCATION/TRAINING PROGRAM

## 2024-03-12 PROCEDURE — 6360000002 HC RX W HCPCS: Performed by: STUDENT IN AN ORGANIZED HEALTH CARE EDUCATION/TRAINING PROGRAM

## 2024-03-12 PROCEDURE — 2580000003 HC RX 258: Performed by: NEUROLOGICAL SURGERY

## 2024-03-12 PROCEDURE — 94660 CPAP INITIATION&MGMT: CPT

## 2024-03-12 PROCEDURE — 6360000002 HC RX W HCPCS: Performed by: NEUROLOGICAL SURGERY

## 2024-03-12 PROCEDURE — APPNB45 APP NON BILLABLE 31-45 MINUTES: Performed by: NURSE PRACTITIONER

## 2024-03-12 PROCEDURE — 6360000002 HC RX W HCPCS: Performed by: NURSE PRACTITIONER

## 2024-03-12 PROCEDURE — 99024 POSTOP FOLLOW-UP VISIT: CPT | Performed by: NURSE PRACTITIONER

## 2024-03-12 PROCEDURE — 2060000000 HC ICU INTERMEDIATE R&B

## 2024-03-12 RX ORDER — PANTOPRAZOLE SODIUM 40 MG/1
40 TABLET, DELAYED RELEASE ORAL
Status: CANCELLED | OUTPATIENT
Start: 2024-03-13

## 2024-03-12 RX ORDER — PROMETHAZINE HYDROCHLORIDE 12.5 MG/1
12.5 TABLET ORAL EVERY 6 HOURS PRN
Status: CANCELLED | OUTPATIENT
Start: 2024-03-12

## 2024-03-12 RX ORDER — METHOCARBAMOL 750 MG/1
750 TABLET, FILM COATED ORAL EVERY 6 HOURS PRN
Status: CANCELLED | OUTPATIENT
Start: 2024-03-12

## 2024-03-12 RX ORDER — SPIRONOLACTONE 25 MG/1
25 TABLET ORAL DAILY
Status: CANCELLED | OUTPATIENT
Start: 2024-03-13

## 2024-03-12 RX ORDER — ACETAMINOPHEN 500 MG
1000 TABLET ORAL EVERY 6 HOURS SCHEDULED
Status: CANCELLED | OUTPATIENT
Start: 2024-03-12

## 2024-03-12 RX ORDER — OXYCODONE HYDROCHLORIDE 5 MG/1
5 TABLET ORAL EVERY 4 HOURS PRN
Status: CANCELLED | OUTPATIENT
Start: 2024-03-12

## 2024-03-12 RX ORDER — POLYETHYLENE GLYCOL 3350 17 G/17G
17 POWDER, FOR SOLUTION ORAL DAILY PRN
Status: CANCELLED | OUTPATIENT
Start: 2024-03-12

## 2024-03-12 RX ORDER — OXYCODONE HYDROCHLORIDE 5 MG/1
10 TABLET ORAL EVERY 4 HOURS PRN
Status: CANCELLED | OUTPATIENT
Start: 2024-03-12

## 2024-03-12 RX ORDER — ONDANSETRON 2 MG/ML
4 INJECTION INTRAMUSCULAR; INTRAVENOUS EVERY 6 HOURS PRN
Status: CANCELLED | OUTPATIENT
Start: 2024-03-12

## 2024-03-12 RX ORDER — SODIUM CHLORIDE 0.9 % (FLUSH) 0.9 %
5-40 SYRINGE (ML) INJECTION EVERY 12 HOURS SCHEDULED
Status: CANCELLED | OUTPATIENT
Start: 2024-03-12

## 2024-03-12 RX ORDER — AMLODIPINE BESYLATE 5 MG/1
5 TABLET ORAL DAILY
Status: CANCELLED | OUTPATIENT
Start: 2024-03-13

## 2024-03-12 RX ORDER — GABAPENTIN 600 MG/1
600 TABLET ORAL 4 TIMES DAILY
Status: CANCELLED | OUTPATIENT
Start: 2024-03-12

## 2024-03-12 RX ORDER — CETIRIZINE HYDROCHLORIDE 10 MG/1
5 TABLET ORAL DAILY PRN
Status: CANCELLED | OUTPATIENT
Start: 2024-03-12

## 2024-03-12 RX ORDER — ENOXAPARIN SODIUM 100 MG/ML
30 INJECTION SUBCUTANEOUS 2 TIMES DAILY
Status: CANCELLED | OUTPATIENT
Start: 2024-03-12

## 2024-03-12 RX ORDER — ARFORMOTEROL TARTRATE 15 UG/2ML
15 SOLUTION RESPIRATORY (INHALATION)
Status: CANCELLED | OUTPATIENT
Start: 2024-03-12

## 2024-03-12 RX ORDER — LOSARTAN POTASSIUM 25 MG/1
50 TABLET ORAL DAILY
Status: CANCELLED | OUTPATIENT
Start: 2024-03-13

## 2024-03-12 RX ORDER — CARBIDOPA/LEVODOPA 25MG-250MG
1 TABLET ORAL NIGHTLY
Status: CANCELLED | OUTPATIENT
Start: 2024-03-12

## 2024-03-12 RX ORDER — ALLOPURINOL 300 MG/1
300 TABLET ORAL DAILY
Status: CANCELLED | OUTPATIENT
Start: 2024-03-13

## 2024-03-12 RX ORDER — ATORVASTATIN CALCIUM 40 MG/1
40 TABLET, FILM COATED ORAL DAILY
Status: CANCELLED | OUTPATIENT
Start: 2024-03-13

## 2024-03-12 RX ORDER — VITAMIN B COMPLEX
1 CAPSULE ORAL DAILY
Status: CANCELLED | OUTPATIENT
Start: 2024-03-13

## 2024-03-12 RX ORDER — BISACODYL 10 MG
10 SUPPOSITORY, RECTAL RECTAL ONCE
Status: COMPLETED | OUTPATIENT
Start: 2024-03-12 | End: 2024-03-12

## 2024-03-12 RX ORDER — BUDESONIDE 0.5 MG/2ML
0.5 INHALANT ORAL
Status: CANCELLED | OUTPATIENT
Start: 2024-03-12

## 2024-03-12 RX ORDER — SODIUM CHLORIDE 0.9 % (FLUSH) 0.9 %
5-40 SYRINGE (ML) INJECTION PRN
Status: CANCELLED | OUTPATIENT
Start: 2024-03-12

## 2024-03-12 RX ORDER — FLUTICASONE PROPIONATE 50 MCG
1 SPRAY, SUSPENSION (ML) NASAL 2 TIMES DAILY
Status: CANCELLED | OUTPATIENT
Start: 2024-03-12

## 2024-03-12 RX ORDER — DOXAZOSIN MESYLATE 4 MG/1
4 TABLET ORAL NIGHTLY
Status: CANCELLED | OUTPATIENT
Start: 2024-03-12

## 2024-03-12 RX ORDER — SENNA AND DOCUSATE SODIUM 50; 8.6 MG/1; MG/1
1 TABLET, FILM COATED ORAL 2 TIMES DAILY
Status: CANCELLED | OUTPATIENT
Start: 2024-03-12

## 2024-03-12 RX ORDER — TRAZODONE HYDROCHLORIDE 100 MG/1
100 TABLET ORAL NIGHTLY
Status: CANCELLED | OUTPATIENT
Start: 2024-03-12

## 2024-03-12 RX ORDER — BISACODYL 5 MG/1
5 TABLET, DELAYED RELEASE ORAL DAILY
Status: CANCELLED | OUTPATIENT
Start: 2024-03-12

## 2024-03-12 RX ADMIN — DOCUSATE SODIUM 50 MG AND SENNOSIDES 8.6 MG 1 TABLET: 8.6; 5 TABLET, FILM COATED ORAL at 19:59

## 2024-03-12 RX ADMIN — BISACODYL 10 MG: 10 SUPPOSITORY RECTAL at 14:13

## 2024-03-12 RX ADMIN — ROPINIROLE 3 MG: 2 TABLET, FILM COATED ORAL at 19:59

## 2024-03-12 RX ADMIN — SODIUM CHLORIDE, PRESERVATIVE FREE 10 ML: 5 INJECTION INTRAVENOUS at 08:39

## 2024-03-12 RX ADMIN — LOSARTAN POTASSIUM 50 MG: 50 TABLET, FILM COATED ORAL at 08:48

## 2024-03-12 RX ADMIN — ACETAMINOPHEN 1000 MG: 500 TABLET ORAL at 00:46

## 2024-03-12 RX ADMIN — DEXAMETHASONE 4 MG: 4 TABLET ORAL at 08:39

## 2024-03-12 RX ADMIN — ATORVASTATIN CALCIUM 40 MG: 40 TABLET, FILM COATED ORAL at 08:39

## 2024-03-12 RX ADMIN — AMLODIPINE BESYLATE 5 MG: 5 TABLET ORAL at 08:39

## 2024-03-12 RX ADMIN — GABAPENTIN 600 MG: 600 TABLET, FILM COATED ORAL at 12:34

## 2024-03-12 RX ADMIN — ENOXAPARIN SODIUM 30 MG: 100 INJECTION SUBCUTANEOUS at 19:58

## 2024-03-12 RX ADMIN — ROPINIROLE 3 MG: 2 TABLET, FILM COATED ORAL at 08:39

## 2024-03-12 RX ADMIN — METHOCARBAMOL 1000 MG: 100 INJECTION, SOLUTION INTRAMUSCULAR; INTRAVENOUS at 02:31

## 2024-03-12 RX ADMIN — GABAPENTIN 600 MG: 600 TABLET, FILM COATED ORAL at 08:39

## 2024-03-12 RX ADMIN — BUDESONIDE 500 MCG: 0.5 INHALANT RESPIRATORY (INHALATION) at 20:22

## 2024-03-12 RX ADMIN — DOCUSATE SODIUM 50 MG AND SENNOSIDES 8.6 MG 1 TABLET: 8.6; 5 TABLET, FILM COATED ORAL at 08:39

## 2024-03-12 RX ADMIN — METHOCARBAMOL 1000 MG: 100 INJECTION, SOLUTION INTRAMUSCULAR; INTRAVENOUS at 09:28

## 2024-03-12 RX ADMIN — SODIUM CHLORIDE, PRESERVATIVE FREE 10 ML: 5 INJECTION INTRAVENOUS at 20:02

## 2024-03-12 RX ADMIN — GABAPENTIN 600 MG: 600 TABLET, FILM COATED ORAL at 19:59

## 2024-03-12 RX ADMIN — ACETAMINOPHEN 1000 MG: 500 TABLET ORAL at 17:19

## 2024-03-12 RX ADMIN — PANTOPRAZOLE SODIUM 40 MG: 40 TABLET, DELAYED RELEASE ORAL at 06:08

## 2024-03-12 RX ADMIN — ARFORMOTEROL TARTRATE 15 MCG: 15 SOLUTION RESPIRATORY (INHALATION) at 08:09

## 2024-03-12 RX ADMIN — ARFORMOTEROL TARTRATE 15 MCG: 15 SOLUTION RESPIRATORY (INHALATION) at 20:22

## 2024-03-12 RX ADMIN — ROPINIROLE 3 MG: 2 TABLET, FILM COATED ORAL at 14:47

## 2024-03-12 RX ADMIN — Medication 1 CAPSULE: at 08:45

## 2024-03-12 RX ADMIN — SODIUM CHLORIDE 2000 MG: 900 INJECTION INTRAVENOUS at 00:47

## 2024-03-12 RX ADMIN — GABAPENTIN 600 MG: 600 TABLET, FILM COATED ORAL at 17:19

## 2024-03-12 RX ADMIN — ENOXAPARIN SODIUM 30 MG: 100 INJECTION SUBCUTANEOUS at 08:38

## 2024-03-12 RX ADMIN — TRAZODONE HYDROCHLORIDE 100 MG: 100 TABLET ORAL at 20:00

## 2024-03-12 RX ADMIN — ACETAMINOPHEN 1000 MG: 500 TABLET ORAL at 12:35

## 2024-03-12 RX ADMIN — DOXAZOSIN 4 MG: 4 TABLET ORAL at 19:59

## 2024-03-12 RX ADMIN — ALLOPURINOL 300 MG: 300 TABLET ORAL at 08:39

## 2024-03-12 RX ADMIN — CARBIDOPA AND LEVODOPA 1 TABLET: 25; 250 TABLET ORAL at 20:00

## 2024-03-12 RX ADMIN — POLYETHYLENE GLYCOL 3350 17 G: 17 POWDER, FOR SOLUTION ORAL at 08:38

## 2024-03-12 RX ADMIN — ACETAMINOPHEN 1000 MG: 500 TABLET ORAL at 06:08

## 2024-03-12 RX ADMIN — SPIRONOLACTONE 25 MG: 25 TABLET ORAL at 08:39

## 2024-03-12 RX ADMIN — BUDESONIDE 500 MCG: 0.5 INHALANT RESPIRATORY (INHALATION) at 08:09

## 2024-03-12 ASSESSMENT — PAIN SCALES - GENERAL
PAINLEVEL_OUTOF10: 0

## 2024-03-12 NOTE — DISCHARGE INSTR - COC
IM, 30mcg/0.3mL 10/03/2023       Active Problems:  Patient Active Problem List   Diagnosis Code    Hypertension I10    Asthma J45.909    Gout M10.9    Sleep apnea G47.30    S/P shoulder surgery Z98.890    S/p right shoulder surgery 4/19/06: arthroscopy, MUM, Neer, RCTR, Slap Z98.890    Status post right rotator cuff surgery, arthroscopy, debridement, Neer and Biceps Tenotomy 3/26/2014 Z98.890    S/P left knee arthroscopy, chondroplasty, synovectomy, and partial lateral meniscectomy 11/29/11 Z98.890    Status post total left knee replacement using cement 02/06/2013 Z96.652    History of multiple low back surgeries by Dr. Augustin 2005, 2007, 2000 and?  And 2013 Z98.890    Numbness and tingling of left leg R20.0, R20.2    Left knee pain M25.562    Chronic right shoulder pain M25.511, G89.29    Status post neck surgery by Dr. Augustin Z98.890    S/P right rotator cuff repair, arthroscopy and debridement 5/10/2017 Z98.890    Ambulatory dysfunction R26.2       Isolation/Infection:   Isolation            No Isolation          Patient Infection Status       None to display            Nurse Assessment:  Last Vital Signs: /64   Pulse 80   Temp 97.9 °F (36.6 °C) (Oral)   Resp 18   Ht 1.829 m (6')   Wt 121.4 kg (267 lb 10.2 oz)   SpO2 96%   BMI 36.30 kg/m²     Last documented pain score (0-10 scale): Pain Level: 0  Last Weight:   Wt Readings from Last 1 Encounters:   03/09/24 121.4 kg (267 lb 10.2 oz)     Mental Status:  {IP PT MENTAL STATUS:62016}    IV Access:  {[facility] ACCESS:412854943}    Nursing Mobility/ADLs:  Walking   {CHP DME ADLs:768417115}  Transfer  {CHP DME ADLs:287578824}  Bathing  {CHP DME ADLs:917041923}  Dressing  {CHP DME ADLs:757350830}  Toileting  {CHP DME ADLs:523980630}  Feeding  {CHP DME ADLs:266816937}  Med Admin  {CHP DME ADLs:581119407}  Med Delivery   { JENY MED Delivery:042777497}    Wound Care Documentation and Therapy:  Incision 11/29/11 Knee Left (Active)   Number of days:

## 2024-03-12 NOTE — PLAN OF CARE
Problem: Discharge Planning  Goal: Discharge to home or other facility with appropriate resources  Outcome: Progressing  Note: CM is following for discharge.      Problem: Safety - Adult  Goal: Free from fall injury  Outcome: Progressing  Note: All fall precautions in place, call light within reach, free from fall injury.      Problem: ABCDS Injury Assessment  Goal: Absence of physical injury  Outcome: Progressing     Problem: Pain  Goal: Verbalizes/displays adequate comfort level or baseline comfort level  Outcome: Progressing

## 2024-03-12 NOTE — PLAN OF CARE
Problem: Discharge Planning  Goal: Discharge to home or other facility with appropriate resources  3/12/2024 0800 by Dilia Yates, RN  Outcome: Progressing   Pt involved in discharge planning. Barriers to discharge discussed with patient. Discharge learning needs identified. Discuss with patient any additional needed resources and transportation plans. Case management following plan of care.    Problem: Safety - Adult  Goal: Free from fall injury  3/12/2024 0800 by Dilia Yates, RN  Outcome: Progressing   All fall precautions in place. Bed locked and in lowest position with alarm on. Overbed table and personal belonings within reach. Call light within reach and patient instructed to use call light for assistance. Non-skid socks on.    Problem: Pain  Goal: Verbalizes/displays adequate comfort level or baseline comfort level  3/12/2024 0800 by Dilia Yates, RN  Outcome: Progressing   Pt endorsing pain to back. Being treated with PRN pain medication, rest, and frequent repositioning with pillow support for comfort and pressure relief. Pt reports some relief from pain with above interventions.

## 2024-03-13 ENCOUNTER — HOSPITAL ENCOUNTER (INPATIENT)
Age: 74
DRG: 560 | End: 2024-03-13
Attending: PHYSICAL MEDICINE & REHABILITATION | Admitting: PHYSICAL MEDICINE & REHABILITATION
Payer: MEDICARE

## 2024-03-13 VITALS
SYSTOLIC BLOOD PRESSURE: 109 MMHG | DIASTOLIC BLOOD PRESSURE: 61 MMHG | HEART RATE: 64 BPM | BODY MASS INDEX: 36.25 KG/M2 | RESPIRATION RATE: 16 BRPM | HEIGHT: 72 IN | WEIGHT: 267.64 LBS | TEMPERATURE: 97.9 F | OXYGEN SATURATION: 95 %

## 2024-03-13 DIAGNOSIS — M47.14 THORACIC MYELOPATHY: Primary | ICD-10-CM

## 2024-03-13 PROCEDURE — 1280000000 HC REHAB R&B

## 2024-03-13 PROCEDURE — 94640 AIRWAY INHALATION TREATMENT: CPT

## 2024-03-13 PROCEDURE — 6370000000 HC RX 637 (ALT 250 FOR IP): Performed by: NEUROLOGICAL SURGERY

## 2024-03-13 PROCEDURE — 97116 GAIT TRAINING THERAPY: CPT

## 2024-03-13 PROCEDURE — 6370000000 HC RX 637 (ALT 250 FOR IP): Performed by: PHYSICAL MEDICINE & REHABILITATION

## 2024-03-13 PROCEDURE — 6370000000 HC RX 637 (ALT 250 FOR IP): Performed by: STUDENT IN AN ORGANIZED HEALTH CARE EDUCATION/TRAINING PROGRAM

## 2024-03-13 PROCEDURE — 6370000000 HC RX 637 (ALT 250 FOR IP): Performed by: NURSE PRACTITIONER

## 2024-03-13 PROCEDURE — 99024 POSTOP FOLLOW-UP VISIT: CPT | Performed by: NURSE PRACTITIONER

## 2024-03-13 PROCEDURE — 2580000003 HC RX 258: Performed by: NEUROLOGICAL SURGERY

## 2024-03-13 PROCEDURE — 2580000003 HC RX 258: Performed by: PHYSICAL MEDICINE & REHABILITATION

## 2024-03-13 PROCEDURE — APPNB30 APP NON BILLABLE TIME 0-30 MINS: Performed by: NURSE PRACTITIONER

## 2024-03-13 PROCEDURE — 94660 CPAP INITIATION&MGMT: CPT

## 2024-03-13 PROCEDURE — 6360000002 HC RX W HCPCS: Performed by: PHYSICAL MEDICINE & REHABILITATION

## 2024-03-13 PROCEDURE — 6360000002 HC RX W HCPCS: Performed by: NEUROLOGICAL SURGERY

## 2024-03-13 PROCEDURE — 97530 THERAPEUTIC ACTIVITIES: CPT

## 2024-03-13 PROCEDURE — 6360000002 HC RX W HCPCS: Performed by: STUDENT IN AN ORGANIZED HEALTH CARE EDUCATION/TRAINING PROGRAM

## 2024-03-13 PROCEDURE — 94761 N-INVAS EAR/PLS OXIMETRY MLT: CPT

## 2024-03-13 RX ORDER — AMLODIPINE BESYLATE 5 MG/1
5 TABLET ORAL DAILY
Status: DISCONTINUED | OUTPATIENT
Start: 2024-03-14 | End: 2024-03-21 | Stop reason: HOSPADM

## 2024-03-13 RX ORDER — ALLOPURINOL 300 MG/1
300 TABLET ORAL DAILY
Status: DISCONTINUED | OUTPATIENT
Start: 2024-03-14 | End: 2024-03-21 | Stop reason: HOSPADM

## 2024-03-13 RX ORDER — VITAMIN B COMPLEX
1 CAPSULE ORAL DAILY
Status: DISCONTINUED | OUTPATIENT
Start: 2024-03-14 | End: 2024-03-21 | Stop reason: HOSPADM

## 2024-03-13 RX ORDER — SENNA AND DOCUSATE SODIUM 50; 8.6 MG/1; MG/1
1 TABLET, FILM COATED ORAL 2 TIMES DAILY
Status: DISCONTINUED | OUTPATIENT
Start: 2024-03-13 | End: 2024-03-21 | Stop reason: HOSPADM

## 2024-03-13 RX ORDER — CETIRIZINE HYDROCHLORIDE 10 MG/1
5 TABLET ORAL DAILY PRN
Status: DISCONTINUED | OUTPATIENT
Start: 2024-03-13 | End: 2024-03-21 | Stop reason: HOSPADM

## 2024-03-13 RX ORDER — CARBIDOPA/LEVODOPA 25MG-250MG
1 TABLET ORAL NIGHTLY
Status: DISCONTINUED | OUTPATIENT
Start: 2024-03-13 | End: 2024-03-21 | Stop reason: HOSPADM

## 2024-03-13 RX ORDER — BISACODYL 5 MG/1
5 TABLET, DELAYED RELEASE ORAL DAILY
Status: DISCONTINUED | OUTPATIENT
Start: 2024-03-13 | End: 2024-03-21 | Stop reason: HOSPADM

## 2024-03-13 RX ORDER — ENOXAPARIN SODIUM 100 MG/ML
30 INJECTION SUBCUTANEOUS 2 TIMES DAILY
Status: DISCONTINUED | OUTPATIENT
Start: 2024-03-13 | End: 2024-03-21 | Stop reason: HOSPADM

## 2024-03-13 RX ORDER — ACETAMINOPHEN 500 MG
1000 TABLET ORAL EVERY 6 HOURS SCHEDULED
Status: DISCONTINUED | OUTPATIENT
Start: 2024-03-13 | End: 2024-03-21 | Stop reason: HOSPADM

## 2024-03-13 RX ORDER — BUDESONIDE 0.5 MG/2ML
0.5 INHALANT ORAL
Status: DISCONTINUED | OUTPATIENT
Start: 2024-03-13 | End: 2024-03-21 | Stop reason: HOSPADM

## 2024-03-13 RX ORDER — OXYCODONE HYDROCHLORIDE 5 MG/1
5 TABLET ORAL EVERY 4 HOURS PRN
Status: DISCONTINUED | OUTPATIENT
Start: 2024-03-13 | End: 2024-03-21 | Stop reason: HOSPADM

## 2024-03-13 RX ORDER — ARFORMOTEROL TARTRATE 15 UG/2ML
15 SOLUTION RESPIRATORY (INHALATION)
Status: DISCONTINUED | OUTPATIENT
Start: 2024-03-13 | End: 2024-03-21 | Stop reason: HOSPADM

## 2024-03-13 RX ORDER — ROPINIROLE 1 MG/1
3 TABLET, FILM COATED ORAL 3 TIMES DAILY
Status: DISCONTINUED | OUTPATIENT
Start: 2024-03-13 | End: 2024-03-21 | Stop reason: HOSPADM

## 2024-03-13 RX ORDER — TRAZODONE HYDROCHLORIDE 50 MG/1
100 TABLET ORAL NIGHTLY
Status: DISCONTINUED | OUTPATIENT
Start: 2024-03-13 | End: 2024-03-21 | Stop reason: HOSPADM

## 2024-03-13 RX ORDER — ONDANSETRON 2 MG/ML
4 INJECTION INTRAMUSCULAR; INTRAVENOUS EVERY 6 HOURS PRN
Status: DISCONTINUED | OUTPATIENT
Start: 2024-03-13 | End: 2024-03-21 | Stop reason: HOSPADM

## 2024-03-13 RX ORDER — OXYCODONE HYDROCHLORIDE 5 MG/1
10 TABLET ORAL EVERY 4 HOURS PRN
Status: DISCONTINUED | OUTPATIENT
Start: 2024-03-13 | End: 2024-03-21 | Stop reason: HOSPADM

## 2024-03-13 RX ORDER — SPIRONOLACTONE 25 MG/1
25 TABLET ORAL DAILY
Status: DISCONTINUED | OUTPATIENT
Start: 2024-03-14 | End: 2024-03-21 | Stop reason: HOSPADM

## 2024-03-13 RX ORDER — SODIUM CHLORIDE 0.9 % (FLUSH) 0.9 %
5-40 SYRINGE (ML) INJECTION EVERY 12 HOURS SCHEDULED
Status: DISCONTINUED | OUTPATIENT
Start: 2024-03-13 | End: 2024-03-14

## 2024-03-13 RX ORDER — DEXTROMETHORPHAN HYDROBROMIDE AND PROMETHAZINE HYDROCHLORIDE 15; 6.25 MG/5ML; MG/5ML
SYRUP ORAL 4 TIMES DAILY PRN
Status: ON HOLD | COMMUNITY
End: 2024-03-20 | Stop reason: HOSPADM

## 2024-03-13 RX ORDER — LOSARTAN POTASSIUM 25 MG/1
50 TABLET ORAL DAILY
Status: DISCONTINUED | OUTPATIENT
Start: 2024-03-14 | End: 2024-03-21 | Stop reason: HOSPADM

## 2024-03-13 RX ORDER — POLYETHYLENE GLYCOL 3350 17 G/17G
17 POWDER, FOR SOLUTION ORAL DAILY PRN
Status: DISCONTINUED | OUTPATIENT
Start: 2024-03-13 | End: 2024-03-21 | Stop reason: HOSPADM

## 2024-03-13 RX ORDER — PANTOPRAZOLE SODIUM 40 MG/1
40 TABLET, DELAYED RELEASE ORAL
Status: DISCONTINUED | OUTPATIENT
Start: 2024-03-14 | End: 2024-03-21 | Stop reason: HOSPADM

## 2024-03-13 RX ORDER — SODIUM CHLORIDE 0.9 % (FLUSH) 0.9 %
5-40 SYRINGE (ML) INJECTION PRN
Status: DISCONTINUED | OUTPATIENT
Start: 2024-03-13 | End: 2024-03-21 | Stop reason: HOSPADM

## 2024-03-13 RX ORDER — DOXAZOSIN 2 MG/1
4 TABLET ORAL NIGHTLY
Status: DISCONTINUED | OUTPATIENT
Start: 2024-03-13 | End: 2024-03-21 | Stop reason: HOSPADM

## 2024-03-13 RX ORDER — FLUTICASONE PROPIONATE 50 MCG
1 SPRAY, SUSPENSION (ML) NASAL 2 TIMES DAILY
Status: DISCONTINUED | OUTPATIENT
Start: 2024-03-13 | End: 2024-03-21 | Stop reason: HOSPADM

## 2024-03-13 RX ORDER — GABAPENTIN 300 MG/1
600 CAPSULE ORAL 4 TIMES DAILY
Status: DISCONTINUED | OUTPATIENT
Start: 2024-03-13 | End: 2024-03-21 | Stop reason: HOSPADM

## 2024-03-13 RX ORDER — ATORVASTATIN CALCIUM 40 MG/1
40 TABLET, FILM COATED ORAL DAILY
Status: DISCONTINUED | OUTPATIENT
Start: 2024-03-14 | End: 2024-03-21 | Stop reason: HOSPADM

## 2024-03-13 RX ORDER — METHOCARBAMOL 750 MG/1
750 TABLET, FILM COATED ORAL EVERY 6 HOURS PRN
Status: DISCONTINUED | OUTPATIENT
Start: 2024-03-13 | End: 2024-03-21 | Stop reason: HOSPADM

## 2024-03-13 RX ORDER — ACETAMINOPHEN 500 MG
1000 TABLET ORAL EVERY 6 HOURS PRN
COMMUNITY

## 2024-03-13 RX ORDER — PROMETHAZINE HYDROCHLORIDE 25 MG/1
12.5 TABLET ORAL EVERY 6 HOURS PRN
Status: DISCONTINUED | OUTPATIENT
Start: 2024-03-13 | End: 2024-03-21 | Stop reason: HOSPADM

## 2024-03-13 RX ADMIN — CARBIDOPA AND LEVODOPA 1 TABLET: 25; 250 TABLET ORAL at 20:47

## 2024-03-13 RX ADMIN — GABAPENTIN 600 MG: 600 TABLET, FILM COATED ORAL at 09:01

## 2024-03-13 RX ADMIN — GABAPENTIN 600 MG: 300 CAPSULE ORAL at 20:45

## 2024-03-13 RX ADMIN — DOCUSATE SODIUM 50 MG AND SENNOSIDES 8.6 MG 1 TABLET: 8.6; 5 TABLET, FILM COATED ORAL at 09:00

## 2024-03-13 RX ADMIN — LOSARTAN POTASSIUM 50 MG: 50 TABLET, FILM COATED ORAL at 10:08

## 2024-03-13 RX ADMIN — TRAZODONE HYDROCHLORIDE 100 MG: 50 TABLET ORAL at 20:46

## 2024-03-13 RX ADMIN — POLYETHYLENE GLYCOL 3350 17 G: 17 POWDER, FOR SOLUTION ORAL at 09:00

## 2024-03-13 RX ADMIN — SODIUM CHLORIDE, PRESERVATIVE FREE 10 ML: 5 INJECTION INTRAVENOUS at 09:01

## 2024-03-13 RX ADMIN — METHOCARBAMOL TABLETS 750 MG: 750 TABLET, COATED ORAL at 09:00

## 2024-03-13 RX ADMIN — ROPINIROLE 3 MG: 2 TABLET, FILM COATED ORAL at 09:00

## 2024-03-13 RX ADMIN — GABAPENTIN 600 MG: 600 TABLET, FILM COATED ORAL at 15:09

## 2024-03-13 RX ADMIN — Medication 1 CAPSULE: at 09:03

## 2024-03-13 RX ADMIN — PANTOPRAZOLE SODIUM 40 MG: 40 TABLET, DELAYED RELEASE ORAL at 05:49

## 2024-03-13 RX ADMIN — ENOXAPARIN SODIUM 30 MG: 100 INJECTION SUBCUTANEOUS at 20:47

## 2024-03-13 RX ADMIN — ACETAMINOPHEN 1000 MG: 500 TABLET ORAL at 12:09

## 2024-03-13 RX ADMIN — ACETAMINOPHEN 1000 MG: 500 TABLET ORAL at 18:07

## 2024-03-13 RX ADMIN — ROPINIROLE 3 MG: 2 TABLET, FILM COATED ORAL at 15:09

## 2024-03-13 RX ADMIN — SODIUM CHLORIDE, PRESERVATIVE FREE 10 ML: 5 INJECTION INTRAVENOUS at 20:48

## 2024-03-13 RX ADMIN — SPIRONOLACTONE 25 MG: 25 TABLET ORAL at 09:02

## 2024-03-13 RX ADMIN — FLUTICASONE PROPIONATE 1 SPRAY: 50 SPRAY, METERED NASAL at 09:03

## 2024-03-13 RX ADMIN — ACETAMINOPHEN 1000 MG: 500 TABLET ORAL at 05:49

## 2024-03-13 RX ADMIN — ROPINIROLE HYDROCHLORIDE 3 MG: 1 TABLET, FILM COATED ORAL at 20:46

## 2024-03-13 RX ADMIN — ENOXAPARIN SODIUM 30 MG: 100 INJECTION SUBCUTANEOUS at 08:59

## 2024-03-13 RX ADMIN — ARFORMOTEROL TARTRATE 15 MCG: 15 SOLUTION RESPIRATORY (INHALATION) at 08:32

## 2024-03-13 RX ADMIN — FLUTICASONE PROPIONATE 1 SPRAY: 50 SPRAY, METERED NASAL at 20:48

## 2024-03-13 RX ADMIN — BUDESONIDE 500 MCG: 0.5 INHALANT RESPIRATORY (INHALATION) at 08:32

## 2024-03-13 RX ADMIN — DOXAZOSIN 4 MG: 2 TABLET ORAL at 20:46

## 2024-03-13 RX ADMIN — ALLOPURINOL 300 MG: 300 TABLET ORAL at 09:02

## 2024-03-13 RX ADMIN — ATORVASTATIN CALCIUM 40 MG: 40 TABLET, FILM COATED ORAL at 09:02

## 2024-03-13 RX ADMIN — DOCUSATE SODIUM 50 MG AND SENNOSIDES 8.6 MG 1 TABLET: 8.6; 5 TABLET, FILM COATED ORAL at 20:47

## 2024-03-13 RX ADMIN — AMLODIPINE BESYLATE 5 MG: 5 TABLET ORAL at 09:02

## 2024-03-13 ASSESSMENT — PAIN DESCRIPTION - DESCRIPTORS: DESCRIPTORS: SHARP

## 2024-03-13 ASSESSMENT — PAIN SCALES - GENERAL
PAINLEVEL_OUTOF10: 1
PAINLEVEL_OUTOF10: 0
PAINLEVEL_OUTOF10: 0

## 2024-03-13 ASSESSMENT — PAIN DESCRIPTION - LOCATION: LOCATION: SHOULDER

## 2024-03-13 ASSESSMENT — PAIN - FUNCTIONAL ASSESSMENT: PAIN_FUNCTIONAL_ASSESSMENT: ACTIVITIES ARE NOT PREVENTED

## 2024-03-13 ASSESSMENT — PAIN DESCRIPTION - ORIENTATION: ORIENTATION: RIGHT;LEFT

## 2024-03-13 NOTE — PLAN OF CARE
Problem: Safety - Adult  Goal: Free from fall injury  Outcome: Progressing   All fall precautions in place. Bed locked and in lowest position with alarm on. Overbed table and personal belonings within reach. Call light within reach and patient instructed to use call light for assistance. Non-skid socks on.   Problem: Pain  Goal: Verbalizes/displays adequate comfort level or baseline comfort level  Outcome: Progressing   Pt endorsing pain to back. Being treated with PRN pain medication, rest, and frequent repositioning with pillow support for comfort and pressure relief. Pt reports some relief from pain with above interventions.

## 2024-03-13 NOTE — PLAN OF CARE
ARU PATIENT TREATMENT PLAN  [facility]  [address] [place] [address]  [phone]    Marko Bermudez    : 1950  Acct #: [id number]  MRN: [id number]  PHYSICIAN:  Mac Phelps DO  Primary Problem    Patient Active Problem List   Diagnosis    Hypertension    Asthma    Gout    Sleep apnea    S/P shoulder surgery    S/p right shoulder surgery 06: arthroscopy, MUM, Neer, RCTR, Slap    Status post right rotator cuff surgery, arthroscopy, debridement, Neer and Biceps Tenotomy 3/26/2014    S/P left knee arthroscopy, chondroplasty, synovectomy, and partial lateral meniscectomy 11    Status post total left knee replacement using cement 2013    History of multiple low back surgeries by Dr. Augustin , ,  and?  And     Numbness and tingling of left leg    Left knee pain    Chronic right shoulder pain    Status post neck surgery by Dr. Augustin    S/P right rotator cuff repair, arthroscopy and debridement 5/10/2017    Ambulatory dysfunction    Thoracic myelopathy       Rehabilitation Diagnosis:  Spinal Cord Dysfunction - Non-traumatic, 4.130, Other NTSCI  ADMIT DATE:3/13/2024    Patient Goals: \"to walk out of here\"   Admitting Impairments: Decreased functional mobility ;Decreased ADL status;Decreased endurance;Decreased balance;Decreased safe awareness;Decreased high-level IADLs   Activity Restrictions: No Bending, No Lifting, No Twisting; TLSO   Participation Limitations: None   CARE PLAN   NURSING:  Marko Bermudez while on this unit will:  [] Be continent of bowel and bladder     [x] Have an adequate number of bowel movements  [x] Urinate with no urinary retention >300ml in bladder  [] Complete bladder protocol with meraz removal  [x] Maintain O2 SATs at 90% or greater.  [x] Have pain managed while on ARU       [] Be pain free by discharge   [x] Have no skin breakdown while on ARU  [x] Have improved skin integrity via wound  Precautions  [x] Bowel/Bladder Fx  [] Weight bearing precautions  [x] Wound Care    [x] Pain Mgmt   [x] Infection Protection  [x] DVT Prophylaxis   [x] Fall Precautions  [x] Fluid/Electrolyte/Nutrition Balance  [] Voice Protection   [] Respiratory  [] Other:    Medical Prognosis: [x] Good  [] Fair    [] Guarded   Total expected IRF days 10  Anticipated discharge destination:   [] Home Independently   [x] Home Modified Independent  [] Home with supervision    []SNF     [] Other                                           Physician anticipated functional outcomes:Pt will progress to a level of MOD I for all functional mobility and ADLS to allow for a safe return home.      IPOC brief synthesis: This is a 74yo M who came into the hospital for spinal stenosis surgery. He is currently s/p THORACIC 6 - THORACIC 12 EXTENSION OF PREVIOUS SPINAL FUSION TO THORACIC 4 WITH DECOMPRESSION, REMOVAL OF SPINAL CORD STIMULATOR (N/A) and doing well in recovery.      This initial ARU patient treatment plan of care, together with the IPOC & the Education plan, form the foundation for the patient's plan of care.  Weekly patient care conferences are held to evaluate progress towards the initial treatment plan & goals.    I have reviewed this initial plan of care and agree with its contents:    Title   Name    Date    Time    Physician: NATAN MeloPAleksandraH  PM&R  3/15/2024  11:09 AM      Case Mgmt:Eduardo Mcgee MSW 3/14/24 @ 0852    OT: Yuly Yanes, OTR/L 3/14/24 @ 1148    PT: Latasha Bear PT, DPT [id number] 3/14/24 @1600    RN: Electronically signed by Joyce Doe RN on 3/13/2024 at 7:26 PM     ARU Supervisor: Rene Gates, PT, DPT 3/14/2024 @ 0716

## 2024-03-13 NOTE — PROGRESS NOTES
NURSING ASSESSMENT: ARU ADMISSION  [facility]    Patient:Marko Bermudez     Rehab Dx/Hx: Thoracic myelopathy [M47.14]   :1950  MRN:[id number]  Date of Admit: 3/13/2024  Room #: 3102/3102-01    Subjective:   Patient admitted to room 3102 @ 1700 from 5527 via wheelchair.   Alert and oriented x4.  Patient and spouse, Sheree, Oriented to room and call light system. Oriented to rehab routine and therapy schedules. Informed about care conferences and ordering of meals.    Drug / Medication Review:   Medications were reviewed by RN at time of admission  [x]  No potential or actual clinically significant medication issues were noted.      []   Yes, a clinically significant medication issue was identified                 []  Adverse Drug Event:                  []  Allergy:                  []  Side Effect:                  []  Ineffective Therapy:                  []  Drug Interaction:                 []  Duplicated Therapy:                 []  Untreated Indication:                  []  Non-adherence:                 []  Other:                  Nursing/Pharmacy contacted the physician:     Date:              Time:                  Actions recommended by physician were completed:   Date :            Time:    4 Eyes Skin Assessment   The patient is being assessed for: Admission     I agree that 2 RN's have performed a thorough Head to Toe Skin Assessment on the patient. ALL assessment sites listed below have been assessed.   Midline posterior incision/staples/CORNELIO, BLE scattered abrasions, BLE dry skin, L upper posterior Hemovac drain        Areas assessed by both nurses:   [x]   Head, Face, and Ears   [x]   Shoulders, Back, and Chest, Abdomen  [x]   Arms, Elbows, and Hands   [x]   Coccyx, Sacrum, and Ischium  [x]   Legs, Feet, and Heel     Does the Patient have Skin Breakdown?  No         Yunier Prevention initiated:  Yes   Wound Care Orders initiated:   Not Applicable      WOC nurse consulted  for Pressure Injury (Stage 3,4, Unstageable, DTI, NWPT, Complex wounds)and New or Established Ostomies:   Not Applicable    Primary Nurse eSignature: Rupesh Vela  3/14/2024  Co-signer eSignature:

## 2024-03-13 NOTE — CARE COORDINATION
CM Note: Pt in continued agreement with discharge [facility] today as discharge was delayed from yesterday due to bed availability at [facility]. For full discharge note see note dated 3/12/2024.  Electronically signed by MIKE Pantoja on 3/13/2024 at 11:16 AM  [phone]  
Case Management Assessment  Initial Evaluation    Date/Time of Evaluation: 3/11/2024 4:00 PM  Assessment Completed by: MIKE Pantoja    If patient is discharged prior to next notation, then this note serves as note for discharge by case management.    Patient Name: Marko Bermudez                   YOB: 1950  Diagnosis: Difficulty walking [R26.2]  Spinal stenosis, thoracic [M48.04]  Ambulatory dysfunction [R26.2]                   Date / Time: 3/7/2024  9:09 PM    Patient Admission Status: Inpatient   Readmission Risk (Low < 19, Mod (19-27), High > 27): Readmission Risk Score: 12    Current PCP: Emmanuel Russo  PCP verified by CM? Yes    Chart Reviewed: Yes      History Provided by: Patient  Patient Orientation: Alert and Oriented    Patient Cognition: Alert    Hospitalization in the last 30 days (Readmission):  No    If yes, Readmission Assessment in CM Navigator will be completed.    Advance Directives:      Code Status: Full Code   Patient's Primary Decision Maker is: Named in Scanned ACP Document      Discharge Planning:    Patient lives with: Spouse/Significant Other Type of Home: House  Primary Care Giver: Self  Patient Support Systems include: Spouse/Significant Other   Current Financial resources: Medicare  Current community resources: None  Current services prior to admission: None            Current DME:              Type of Home Care services:  None    ADLS  Prior functional level: Independent in ADLs/IADLs  Current functional level: Assistance with the following:, Bathing, Dressing, Toileting, Feeding, Cooking, Shopping, Housework, Mobility    PT AM-PAC: 15 /24  OT AM-PAC: 16 /24    Family can provide assistance at DC: Yes  Would you like Case Management to discuss the discharge plan with any other family members/significant others, and if so, who? Yes  Plans to Return to Present Housing: No  Other Identified Issues/Barriers to RETURNING to current housing: no  Potential 
bedside if staff is available. (payment due at time of pick-up or delivery - cash, check, or card accepted)     Able to afford home medications/ co-pay costs: Yes    ADLS:  Current PT AM-PAC Score: 15 /24  Current OT AM-PAC Score: 16 /24      DISCHARGE Disposition: Inpatient Rehab: [facility] Phone: [phone] Fax: will pull from EPIC    LOC at discharge: Skilled  JENY Completed: Yes    Notification completed in HENS/PAS?:  Not Applicable    IMM Completed:   Not Indicated due to: discharge disposition         Transportation:  Transportation PLAN for discharge:  wheelchair/hospital bed    Mode of Transport: Other: wheelchair/hospital bed  Reason for medical transport: Not Applicable  Name of Transport Company: Not Applicable  Time of Transport: n/a    Transport form completed: Not Indicated    Referrals made at DISCHARGE for outpatient continued care:  Not Applicable    Additional CM Notes: CM met with pt at bedside. Pt is in agreement with discharge to [facility] today. Pt reports he has informed his wife of the discharge to [facility] today and both are in agreement. Pta pt lived at home with his spouse. No other CM needs identified for discharge to [facility] today.    COVID Result:  No results found for: \"COVID19\"    The Plan for Transition of Care is related to the following treatment goals of Difficulty walking [R26.2]  Spinal stenosis, thoracic [M48.04]  Ambulatory dysfunction [R26.2]    The Patient and/or patient representative Marko and his family were provided with a choice of provider and agrees with the discharge plan Yes    Freedom of choice list was provided with basic dialogue that supports the patient's individualized plan of care/goals and shares the quality data associated with the providers. Yes    Care Transitions patient: No    MIKE Pantoja  [facility]  Case Management Department  Ph: [phone]  Fax: [phone]

## 2024-03-13 NOTE — PLAN OF CARE
Problem: Discharge Planning  Goal: Discharge to home or other facility with appropriate resources  Outcome: Progressing  Flowsheets (Taken 3/13/2024 1953)  Discharge to home or other facility with appropriate resources:   Identify barriers to discharge with patient and caregiver   Arrange for needed discharge resources and transportation as appropriate   Identify discharge learning needs (meds, wound care, etc)     Problem: Safety - Adult  Goal: Free from fall injury  Outcome: Progressing  Note: Oriented to call light use, camera on, alarms on.     Problem: ABCDS Injury Assessment  Goal: Absence of physical injury  Outcome: Progressing  Flowsheets (Taken 3/13/2024 1953)  Absence of Physical Injury: Implement safety measures based on patient assessment  Note: TLSO back brace on when OOB, gait belt and walker, assist x 1 with ambulation.

## 2024-03-13 NOTE — PROGRESS NOTES
[facility] Admission Assessment    Ethnicity  \"Are you of , /a, or Fijian origin?\"  Check all that apply:  [x] A.  No, not of , /a, or Fijian Origin  [] B.  Yes, Somali, Somali American, Chicano/a  [] C.  Yes, Algerian  [] D.  Yes, Ishaan  [] E.  Yes, another , , or Fijian origin  [] X.  Patient unable to respond  [] Y.  Patient declines to respond    Race  \"What is your race?\"  Check all that apply:  [x] A.  White  [] B.  Black or   [] C.   or   [] D.   Malagasy  [] E.  Chinese  [] F.  Pakistani  [] G. Gambian  [] H.  Ukrainian  [] I.  Setswana  [] J.  Other   [] K.    [] L.  Kuwaiti or Jyothi  [] M.  Malawian  [] N.  Other   [] X.  Patient unable to respond  [] Y.  Patient declines to respond  [] Z.  None of the above    Language  A.  \"What is your preferred language?\"   English    B.  \"Do you need or want an  to communicate with a doctor or health care staff?\"  Check only one:  [x] 0.  No  [] 1.  Yes  [] 9.  Unable to determine    Transportation  \"Has lack of transportation kept you from medical appointments, meetings, work, or from getting things needed for daily living?\"Check all that apply:  [] A.  Yes, it has kept me from medical appointments or from getting my medications  [] B.  Yes, it has kept me from non-medical meetings, appointments, work, or from getting things that I need  [x] C.  No  [] X.  Patient unable to respond  [] Y.  Patient declines to respond    Hearing  Ability to hear (with hearing aid or hearing appliances if normally used)  []  0.  Adequate - no difficulty in normal conversation, social interaction, listening to TV  [x]  1.  Minimal difficulty - difficulty in some environments (e.g. when person speaks softly or setting is noisy)  []  2.  Moderate difficulty - speaker has to increase volume and speak distinctly   []  3.  Highly impaired - absence of  reviewed and updated as necessary, and are accurate for the admission assessment period.    Assessing/Reviewing RN: Electronically signed by Joyce Doe RN on 3/13/2024 at 5:27 PM     Assessing/Reviewing RN: Rupesh Vela, JOCE  0115   3/14/2024

## 2024-03-14 PROCEDURE — 2580000003 HC RX 258: Performed by: PHYSICAL MEDICINE & REHABILITATION

## 2024-03-14 PROCEDURE — 94761 N-INVAS EAR/PLS OXIMETRY MLT: CPT

## 2024-03-14 PROCEDURE — 94640 AIRWAY INHALATION TREATMENT: CPT

## 2024-03-14 PROCEDURE — 97535 SELF CARE MNGMENT TRAINING: CPT

## 2024-03-14 PROCEDURE — 6370000000 HC RX 637 (ALT 250 FOR IP): Performed by: PHYSICAL MEDICINE & REHABILITATION

## 2024-03-14 PROCEDURE — 97530 THERAPEUTIC ACTIVITIES: CPT

## 2024-03-14 PROCEDURE — 1280000000 HC REHAB R&B

## 2024-03-14 PROCEDURE — 6360000002 HC RX W HCPCS: Performed by: PHYSICAL MEDICINE & REHABILITATION

## 2024-03-14 PROCEDURE — 97116 GAIT TRAINING THERAPY: CPT

## 2024-03-14 PROCEDURE — 97542 WHEELCHAIR MNGMENT TRAINING: CPT

## 2024-03-14 PROCEDURE — 97166 OT EVAL MOD COMPLEX 45 MIN: CPT

## 2024-03-14 PROCEDURE — 97162 PT EVAL MOD COMPLEX 30 MIN: CPT

## 2024-03-14 RX ADMIN — ACETAMINOPHEN 1000 MG: 500 TABLET ORAL at 06:12

## 2024-03-14 RX ADMIN — ALLOPURINOL 300 MG: 300 TABLET ORAL at 09:39

## 2024-03-14 RX ADMIN — DOXAZOSIN 4 MG: 2 TABLET ORAL at 21:06

## 2024-03-14 RX ADMIN — TRAZODONE HYDROCHLORIDE 100 MG: 50 TABLET ORAL at 21:06

## 2024-03-14 RX ADMIN — CARBIDOPA AND LEVODOPA 1 TABLET: 25; 250 TABLET ORAL at 21:06

## 2024-03-14 RX ADMIN — ARFORMOTEROL TARTRATE 15 MCG: 15 SOLUTION RESPIRATORY (INHALATION) at 10:58

## 2024-03-14 RX ADMIN — ROPINIROLE HYDROCHLORIDE 3 MG: 1 TABLET, FILM COATED ORAL at 09:39

## 2024-03-14 RX ADMIN — VITAMIN B COMPLEX 1 CAPSULE: at 09:39

## 2024-03-14 RX ADMIN — ACETAMINOPHEN 1000 MG: 500 TABLET ORAL at 23:52

## 2024-03-14 RX ADMIN — ATORVASTATIN CALCIUM 40 MG: 40 TABLET, FILM COATED ORAL at 09:40

## 2024-03-14 RX ADMIN — OXYCODONE 10 MG: 5 TABLET ORAL at 01:27

## 2024-03-14 RX ADMIN — OXYCODONE 10 MG: 5 TABLET ORAL at 21:06

## 2024-03-14 RX ADMIN — METHOCARBAMOL 750 MG: 750 TABLET ORAL at 21:56

## 2024-03-14 RX ADMIN — ENOXAPARIN SODIUM 30 MG: 100 INJECTION SUBCUTANEOUS at 09:39

## 2024-03-14 RX ADMIN — ROPINIROLE HYDROCHLORIDE 3 MG: 1 TABLET, FILM COATED ORAL at 21:56

## 2024-03-14 RX ADMIN — GABAPENTIN 600 MG: 300 CAPSULE ORAL at 21:06

## 2024-03-14 RX ADMIN — PANTOPRAZOLE SODIUM 40 MG: 40 TABLET, DELAYED RELEASE ORAL at 06:11

## 2024-03-14 RX ADMIN — DOCUSATE SODIUM 50 MG AND SENNOSIDES 8.6 MG 1 TABLET: 8.6; 5 TABLET, FILM COATED ORAL at 09:39

## 2024-03-14 RX ADMIN — ARFORMOTEROL TARTRATE 15 MCG: 15 SOLUTION RESPIRATORY (INHALATION) at 21:21

## 2024-03-14 RX ADMIN — BUDESONIDE 500 MCG: 0.5 INHALANT RESPIRATORY (INHALATION) at 10:58

## 2024-03-14 RX ADMIN — ENOXAPARIN SODIUM 30 MG: 100 INJECTION SUBCUTANEOUS at 21:05

## 2024-03-14 RX ADMIN — ROPINIROLE HYDROCHLORIDE 3 MG: 1 TABLET, FILM COATED ORAL at 13:47

## 2024-03-14 RX ADMIN — METHOCARBAMOL 750 MG: 750 TABLET ORAL at 06:12

## 2024-03-14 RX ADMIN — DOCUSATE SODIUM 50 MG AND SENNOSIDES 8.6 MG 1 TABLET: 8.6; 5 TABLET, FILM COATED ORAL at 21:06

## 2024-03-14 RX ADMIN — SPIRONOLACTONE 25 MG: 25 TABLET ORAL at 09:40

## 2024-03-14 RX ADMIN — LOSARTAN POTASSIUM 50 MG: 25 TABLET, FILM COATED ORAL at 09:39

## 2024-03-14 RX ADMIN — GABAPENTIN 600 MG: 300 CAPSULE ORAL at 13:47

## 2024-03-14 RX ADMIN — AMLODIPINE BESYLATE 5 MG: 5 TABLET ORAL at 09:40

## 2024-03-14 RX ADMIN — BUDESONIDE 500 MCG: 0.5 INHALANT RESPIRATORY (INHALATION) at 21:21

## 2024-03-14 RX ADMIN — GABAPENTIN 600 MG: 300 CAPSULE ORAL at 09:40

## 2024-03-14 RX ADMIN — ACETAMINOPHEN 1000 MG: 500 TABLET ORAL at 13:47

## 2024-03-14 ASSESSMENT — PAIN DESCRIPTION - LOCATION
LOCATION: SHOULDER
LOCATION: BACK;SHOULDER
LOCATION: SHOULDER

## 2024-03-14 ASSESSMENT — PAIN SCALES - WONG BAKER
WONGBAKER_NUMERICALRESPONSE: 2
WONGBAKER_NUMERICALRESPONSE: 4
WONGBAKER_NUMERICALRESPONSE: HURTS A LITTLE BIT
WONGBAKER_NUMERICALRESPONSE: HURTS LITTLE MORE

## 2024-03-14 ASSESSMENT — PAIN DESCRIPTION - ORIENTATION
ORIENTATION: RIGHT;LEFT
ORIENTATION: RIGHT;LEFT;MID
ORIENTATION: RIGHT;LEFT

## 2024-03-14 ASSESSMENT — PAIN SCALES - GENERAL
PAINLEVEL_OUTOF10: 0
PAINLEVEL_OUTOF10: 7
PAINLEVEL_OUTOF10: 5
PAINLEVEL_OUTOF10: 0
PAINLEVEL_OUTOF10: 7
PAINLEVEL_OUTOF10: 0

## 2024-03-14 ASSESSMENT — PAIN DESCRIPTION - PAIN TYPE
TYPE: ACUTE PAIN
TYPE: SURGICAL PAIN;ACUTE PAIN
TYPE: ACUTE PAIN;SURGICAL PAIN

## 2024-03-14 ASSESSMENT — PAIN DESCRIPTION - ONSET
ONSET: GRADUAL
ONSET: ON-GOING
ONSET: ON-GOING

## 2024-03-14 ASSESSMENT — PAIN DESCRIPTION - DESCRIPTORS
DESCRIPTORS: ACHING;DISCOMFORT
DESCRIPTORS: DISCOMFORT;ACHING
DESCRIPTORS: TIGHTNESS

## 2024-03-14 ASSESSMENT — PAIN DESCRIPTION - FREQUENCY
FREQUENCY: CONTINUOUS
FREQUENCY: INTERMITTENT
FREQUENCY: CONTINUOUS

## 2024-03-14 ASSESSMENT — PAIN - FUNCTIONAL ASSESSMENT
PAIN_FUNCTIONAL_ASSESSMENT: ACTIVITIES ARE NOT PREVENTED
PAIN_FUNCTIONAL_ASSESSMENT: ACTIVITIES ARE NOT PREVENTED
PAIN_FUNCTIONAL_ASSESSMENT: PREVENTS OR INTERFERES SOME ACTIVE ACTIVITIES AND ADLS

## 2024-03-14 NOTE — PROGRESS NOTES
Physical Therapy  Facility/Department: [facility] ACUTE REHAB UNIT  Rehabilitation Physical Therapy Initial Assessment    NAME: Marko Bermudez  : 1950 (73 y.o.)  MRN: [id number]  CODE STATUS: Full Code    Date of Service: 3/14/24      Past Medical History:   Diagnosis Date    Arthritis     Asthma     Gout     Hyperlipidemia     Hypertension     Restless leg     SOB (shortness of breath)     \"unexplained\", pt states this has been worked up     Past Surgical History:   Procedure Laterality Date    BACK SURGERY      three times    BACK SURGERY  may 2013    CARPAL TUNNEL RELEASE      CERVICAL FUSION      COLONOSCOPY      CYSTOSCOPY      HERNIA REPAIR      ventral    KNEE ARTHROPLASTY Left 13    KNEE ARTHROSCOPY      left    KNEE ARTHROSCOPY  [id number]    left    LAMINECTOMY N/A 3/9/2024    THORACIC 6 - THORACIC 12 EXTENSION OF PREVIOUS SPINAL FUSION TO THORACIC 4 WITH DECOMPRESSION, REMOVAL OF SPINAL CORD STIMULATOR performed by Danny Wilson MD at [facility] OR    NECK SURGERY      twice    OTHER SURGICAL HISTORY  2013    LEFT SHOULDER ARTHROSCOPY, DEBRIDEMENT, ROTATOR CUFF REPAIR,    ROTATOR CUFF REPAIR      SHOULDER ARTHROSCOPY Right 3/26/14    RIGHT SHOULDER ARTHROSCOPY ; ROTATOR CUFF REPAIR;    SHOULDER SURGERY      bilateral    SHOULDER SURGERY Right 05/10/2017    RIGHT SHOULDER ARTHROSCOPY WITH DEBRIDEMENT ROTATOR CUFF REPAIR    TOE SURGERY      implant right great toe       Chart Reviewed: Yes  Additional Pertinent Hx: 74 y/o pt s/p THORACIC 6 - THORACIC 12 EXTENSION OF PREVIOUS SPINAL FUSION TO THORACIC 4 WITH DECOMPRESSION, REMOVAL OF SPINAL CORD STIMULATOR.  Family / Caregiver Present: No  Referring Practitioner: DO Mattie    Restrictions:  Required Braces or Orthoses  Spinal: Thoracic Lumbar Sacral Orthotics  Position Activity Restriction  Spinal Precautions: No Bending;No Lifting;No Twisting  Other position/activity restrictions: up with assist, ambulate; full code; adult diet reg 3 carb; TLSO brace  75    Total Treatment Minutes:   90          Latasha Bear PT, DPT [id number]

## 2024-03-14 NOTE — PROGRESS NOTES
Oriented x 4. Assist x 1 with use of gait belt and walker; TLSO brace on OOB. Patient and spouse, Sheree, oriented to room and therapy schedule. Call light in reach, alarm on for safety.

## 2024-03-14 NOTE — DISCHARGE SUMMARY
generator lead.     Severe stenosis of the central canal at T8-9 which is multifactorial. Moderate to severe stenosis of the central canal at L4-5 and L5-S1. Electronically signed by Milan Baker MD    FL MYELOGRAM 2 OR MORE REGIONS VIA LUMB INJ    Result Date: 3/8/2024  LUMBAR MYELOGRAM HISTORY : Back pain  : Mera Espinal CNP COMMENTS : The benefits, risks, and alternatives of the procedure were discussed with the patient and informed, written consent was obtained. The low back was prepped and draped in sterile fashion and 1% lidocaine used for local anesthesia.  Under fluoroscopic guidance, a 22 gauge spinal needle was advanced into the thecal sac at the L4 level from an interlaminar approach. 10 cc of intrathecal contrast was injected. The patient tolerated the procedure without difficulty. There were no immediate complications. Air Kerma : 9.69 mGy Blood loss : minimal (less than 5 cc) Specimens removed : None     IMPRESSION : Successful L4 lumbar puncture with instillation of intrathecal contrast for purposes of CT myelography. Imaging: Post injection fluoroscopic imaging shows no discrete lumbar foraminal stenosis or central canal stenosis. Please see subsequent CT imaging for further evaluation.     CT THORACIC SPINE WO CONTRAST    Result Date: 3/7/2024  CT THORACIC SPINE WO CONTRAST, CT LUMBAR SPINE WO CONTRAST HISTORY: pain; trouble walking COMPARISON: None. TECHNIQUE: CT thoracic and lumbar spine without contrast per standard protocol. Axial images and multiplanar reformatted images are provided for review. Up-to-date CT equipment and radiation dose reduction techniques were employed. FINDINGS: T9-S1 spinal fusion. Vertical rods and transpedicular screws without evidence of fracture. Lucency around the tip of the left transsacroiliac screws and right S1 transpedicular screw suggestive of hardware loosening. Postoperative changes related to posterior decompression from T10 to L5. T8-9

## 2024-03-14 NOTE — CARE COORDINATION
Case Management Assessment  Initial Evaluation    Date/Time of Evaluation: 3/14/2024 4:47 PM  Assessment Completed by: MIKE Lawson    If patient is discharged prior to next notation, then this note serves as note for discharge by case management.    Patient Name: Marko Bermudez                   YOB: 1950  Diagnosis: Thoracic myelopathy [M47.14]                   Date / Time: 3/13/2024  5:19 PM    Patient Admission Status: REHAB IP   Readmission Risk (Low < 19, Mod (19-27), High > 27): Readmission Risk Score: 12.9    Current PCP: Emmanuel Russo  PCP verified by CM? Yes    Chart Reviewed: Yes      History Provided by: Patient  Patient Orientation: Alert and Oriented    Patient Cognition: Alert    Hospitalization in the last 30 days (Readmission):  Yes    If yes, Readmission Assessment in CM Navigator will be completed.    Advance Directives:      Code Status: Full Code   Patient's Primary Decision Maker is: Named in Scanned ACP Document      Discharge Planning:    Patient lives with: Spouse/Significant Other Type of Home: House  Primary Care Giver: Self  Patient Support Systems include: Spouse/Significant Other   Current Financial resources: None  Current community resources: None  Current services prior to admission: None            Current DME:              Type of Home Care services:  None    ADLS  Prior functional level: Assistance with the following:, Mobility  Current functional level: Assistance with the following:, Mobility    PT AM-PAC:   /24  OT AM-PAC:   /24    Family can provide assistance at DC: Yes  Would you like Case Management to discuss the discharge plan with any other family members/significant others, and if so, who? Yes  Plans to Return to Present Housing: Yes  Other Identified Issues/Barriers to RETURNING to current housing: Medical Clearance   Potential Assistance needed at discharge: N/A            Potential DME:    Patient expects to discharge to:  Unknown  Plan for transportation at discharge: Other (see comment) (TBD)    Financial    Payor: [employer] MEDICARE / Plan: [employer] CHOICE-PPO MEDICARE / Product Type: *No Product type* /     Does insurance require precert for SNF: Yes    Potential assistance Purchasing Medications: No  Meds-to-Beds request: Yes      [id number] - [place], [place] - [address] [place] [address] - P [phone] - F [phone]  [address] [place] [address]  Phone: [phone] Fax: [phone]      Notes:    Factors facilitating achievement of predicted outcomes: Family support, Caregiver support, Friend support, Motivated, and Cooperative    Barriers to discharge: Medical Clearance     Additional Case Management Notes: Patient is from single story ranch style home with spouse and uses a walker as baseline for balance and mobility. SW following.     The Plan for Transition of Care is related to the following treatment goals of Thoracic myelopathy [M47.14]    IF APPLICABLE: The Patient and/or patient representative Marko and his family were provided with a choice of provider and agrees with the discharge plan. Freedom of choice list with basic dialogue that supports the patient's individualized plan of care/goals and shares the quality data associated with the providers was provided to: Patient   Patient Representative Name:       The Patient and/or Patient Representative Agree with the Discharge Plan? Yes    MIKE Lawson  Case Management Department  Ph: [phone] Fax: [phone]

## 2024-03-14 NOTE — PROGRESS NOTES
NEUROSURGERY POST-OP PROGRESS NOTE    Patient Name: Marko Bermudez YOB: 1950   Sex: Male Age: 73 yrs     Medical Record Number: [id number] Acct Number: [id number]   Room Number: 5527/5527-01 Hospital Day: Hospital Day: 5     Interval History:  Post-operative Day# 2 s/p  THORACIC 6 - THORACIC 12 EXTENSION OF PREVIOUS SPINAL FUSION TO THORACIC 4 WITH DECOMPRESSION, REMOVAL OF SPINAL CORD STIMULATOR with Dr. Wilson    Subjective:  sitting up in chair visiting with family. Pain very well controlled. Says he feels great, he is eager to get to rehab    Objective:    VITAL SIGNS   /63   Pulse 69   Temp 98.1 °F (36.7 °C) (Oral)   Resp 16   Ht 1.829 m (6')   Wt 121.4 kg (267 lb 10.2 oz)   SpO2 94%   BMI 36.30 kg/m²    Height Height: 182.9 cm (6')   Weight Weight - Scale: 121.4 kg (267 lb 10.2 oz)        Allergies No Known Allergies   NPO Status ADULT DIET; Regular; 3 carb choices (45 gm/meal)   Isolation No active isolations     LABS   Basic Metabolic Profile Recent Labs     03/09/24  0552 03/10/24  0602    138    105   CO2 22 18*   BUN 25* 28*   CREATININE 1.2 1.3   GLUCOSE 124* 126*      Complete Blood Count Recent Labs     03/09/24  0552 03/10/24  0728   WBC 14.1* 15.8*   RBC 4.67 4.09*      Coagulation Studies No results for input(s): \"PTT\", \"INR\" in the last 72 hours.    Invalid input(s): \"PLATELETS\", \"PROA\", \"PT\", \"PTTA\"     MEDICATIONS   Inpatient Medications     acetaminophen, 1,000 mg, Oral, 4 times per day    ceFAZolin, 2,000 mg, IntraVENous, Q8H    sodium chloride flush, 5-40 mL, IntraVENous, 2 times per day    polyethylene glycol, 17 g, Oral, Daily    sennosides-docusate sodium, 1 tablet, Oral, BID    methocarbamol (ROBAXIN) 1,000 mg in sodium chloride 0.9 % 100 mL IVPB, 1,000 mg, IntraVENous, Q8H **FOLLOWED BY** [START ON 3/12/2024] methocarbamol, 750 mg, Oral, Q6H PRN    enoxaparin, 30 mg, SubCUTAneous, BID    [COMPLETED] dexAMETHasone, 4 mg, Oral, 3 times per day 
    V2.0    [facility] [place] Progress Note      Name:  Marko Bermudez /Age/Sex: 1950  (73 y.o. male)   MRN & CSN:  [id number] & [id number] Encounter Date/Time: 3/10/2024 3:31 PM EST   Location:  5527/5527-01 PCP: Emmanuel Russo     Attending:Nilay Price MD       Hospital Day: 4    Assessment and Recommendations   Marko Bermudez is a 73 y.o. male with pmh of HLD who presents with Ambulatory dysfunction      #Spinal stenosis of thoracic region with myelopathy  - NSGY consulted  - POD#1 Extension of prior T6-12 fusion up to T4  - Decadron taper  - Analgesia as needed  - PT/OT, PM&R if ARU recommended    #Leukocytosis, likely steroid induced  - No clinical signs of infection currently  - Monitor CBC      Diet ADULT DIET; Regular; 3 carb choices (45 gm/meal)   DVT Prophylaxis [] Lovenox, []  Heparin, [] SCDs, [] Ambulation,  [] Eliquis, [] Xarelto  [] Coumadin   Code Status Full Code   Disposition From: Home  Expected Disposition: TBD  Estimated Date of Discharge: TBD  Patient requires continued admission due to surgery recovery   Surrogate Decision Maker/ POA  Sheree Bermudez, spouse     Personally reviewed Lab Studies and Imaging       Subjective:     Chief Complaint: Leg numbness    Marko Bermudez is a 73 y.o. male who presented with leg numbness and increased difficulty walking and found to have myelopathy in thoracic spinal region.    Underwent thoracic fusion yesterday with no significant complications reported. No acute events reported overnight. Patient denies pain today. Reports legs feel about the same today.      Review of Systems:      Pertinent positives and negatives discussed in HPI    Objective:     Intake/Output Summary (Last 24 hours) at 3/10/2024 1441  Last data filed at 3/10/2024 0600  Gross per 24 hour   Intake 405 ml   Output 1525 ml   Net -1120 ml      Vitals:   Vitals:    03/10/24 0731 03/10/24 0900 03/10/24 1024 03/10/24 1240   BP:  (!) 144/72  104/65   Pulse:  73 67 79   Resp: 16 16 16 16 
  Hospitalist Progress Note    Interval history:   Patient seen and examined   Feeling well without complaints    Objective:   Vitals: afebrile, hemodynamically stable, on room air    Exam: Well appearing, sitting up in chair. RRR, comfortable on room air, no peripheral edema, drain in place.    LABS:  Lab Results   Component Value Date    WBC 13.2 (H) 03/11/2024    HGB 13.0 (L) 03/11/2024    HCT 38.6 (L) 03/11/2024    .5 (H) 03/11/2024     (L) 03/11/2024     Lab Results   Component Value Date/Time     03/10/2024 06:02 AM    K 5.8 03/10/2024 06:02 AM    K 4.6 03/09/2024 05:52 AM     03/10/2024 06:02 AM    CO2 18 03/10/2024 06:02 AM    BUN 28 03/10/2024 06:02 AM    CREATININE 1.3 03/10/2024 06:02 AM    GLUCOSE 126 03/10/2024 06:02 AM    CALCIUM 9.0 03/10/2024 06:02 AM    LABGLOM 57 03/10/2024 06:02 AM        -----------------------------------------------------------------  RAD:   XR THORACIC SPINE (2 VIEWS)   Final Result      Prior thoracolumbar fusion with extension of posterior instrumentation to the T6 level with vertical stabilization rods and transpedicular screws. No evidence of hardware fracture.      Electronically signed by Jono Valladares MD      XR THORACIC SPINE (2 VIEWS)   Final Result      Intraoperative fluoroscopy as above.      For additional details please see the medical record for a procedural note by   the performing physician.      FLUORO FOR SURGICAL PROCEDURES   Final Result      Intraoperative fluoroscopy as above.      For additional details please see the medical record for a procedural note by   the performing physician.      CT LUMBAR SPINE WO CONTRAST   Final Result   Severe stenosis of the central canal at T8-9 which is multifactorial.      Moderate to severe stenosis of the central canal at L4-5 and L5-S1.      Electronically signed by Milan Baker MD      CT THORACIC SPINE WO CONTRAST   Final Result   Severe stenosis of the central canal at T8-9 which is 
  Progress Note    Admit Date: 3/7/2024  Day: 4   Diet: ADULT DIET; Regular; 3 carb choices (45 gm/meal)    CC: Ambulatory dysfunction     Interval history:   Patient seen and examined   Doing much better, Denies back pain   Patient is waiting on Pre-cert for Rehab    HPI: Marko Bermudez is a 73 y.o. male with pmh of HLD who presents with Ambulatory dysfunction    Medications:     Scheduled Meds:   acetaminophen  1,000 mg Oral 4 times per day    dexAMETHasone  4 mg Oral Daily    ceFAZolin  2,000 mg IntraVENous Q8H    sodium chloride flush  5-40 mL IntraVENous 2 times per day    polyethylene glycol  17 g Oral Daily    sennosides-docusate sodium  1 tablet Oral BID    methocarbamol (ROBAXIN) 1,000 mg in sodium chloride 0.9 % 100 mL IVPB  1,000 mg IntraVENous Q8H    enoxaparin  30 mg SubCUTAneous BID    allopurinol  300 mg Oral Daily    amLODIPine  5 mg Oral Daily    b complex vitamins  1 capsule Oral Daily    doxazosin  4 mg Oral Nightly    losartan  50 mg Oral Daily    pantoprazole  40 mg Oral QAM AC    spironolactone  25 mg Oral Daily    traZODone  100 mg Oral Nightly    budesonide  0.5 mg Nebulization BID RT    And    arformoterol tartrate  15 mcg Nebulization BID RT    atorvastatin  40 mg Oral Daily    carbidopa-levodopa  1 tablet Oral Nightly    fluticasone  1 spray Each Nostril BID    gabapentin  600 mg Oral 4x Daily    rOPINIRole  3 mg Oral TID     Continuous Infusions:   sodium chloride      sodium chloride 75 mL/hr at 03/09/24 1804     PRN Meds:sodium chloride flush, sodium chloride, naloxone, aluminum & magnesium hydroxide-simethicone, bisacodyl, promethazine **OR** ondansetron, acetaminophen, oxyCODONE **OR** oxyCODONE, HYDROmorphone **OR** HYDROmorphone, diazePAM, methocarbamol (ROBAXIN) 1,000 mg in sodium chloride 0.9 % 100 mL IVPB **FOLLOWED BY** [START ON 3/12/2024] methocarbamol, cetirizine, potassium chloride **OR** potassium alternative oral replacement **OR** potassium chloride, magnesium 
  Progress Note    Admit Date: 3/7/2024  Day: 5   Diet: ADULT DIET; Regular; 3 carb choices (45 gm/meal)    CC: Ambulatory dysfunction     Interval history:   Patient seen and examined   Doing much better, Denies back pain   Medically stable for discharge to Rehab     HPI: Marko Bermudez is a 73 y.o. male with pmh of HLD who presents with Ambulatory dysfunction    Medications:     Scheduled Meds:   bisacodyl  10 mg Rectal Once    acetaminophen  1,000 mg Oral 4 times per day    dexAMETHasone  4 mg Oral Daily    sodium chloride flush  5-40 mL IntraVENous 2 times per day    polyethylene glycol  17 g Oral Daily    sennosides-docusate sodium  1 tablet Oral BID    methocarbamol (ROBAXIN) 1,000 mg in sodium chloride 0.9 % 100 mL IVPB  1,000 mg IntraVENous Q8H    enoxaparin  30 mg SubCUTAneous BID    allopurinol  300 mg Oral Daily    amLODIPine  5 mg Oral Daily    b complex vitamins  1 capsule Oral Daily    doxazosin  4 mg Oral Nightly    losartan  50 mg Oral Daily    pantoprazole  40 mg Oral QAM AC    spironolactone  25 mg Oral Daily    traZODone  100 mg Oral Nightly    budesonide  0.5 mg Nebulization BID RT    And    arformoterol tartrate  15 mcg Nebulization BID RT    atorvastatin  40 mg Oral Daily    carbidopa-levodopa  1 tablet Oral Nightly    fluticasone  1 spray Each Nostril BID    gabapentin  600 mg Oral 4x Daily    rOPINIRole  3 mg Oral TID     Continuous Infusions:   sodium chloride      sodium chloride 75 mL/hr at 03/11/24 1623     PRN Meds:sodium chloride flush, sodium chloride, naloxone, aluminum & magnesium hydroxide-simethicone, bisacodyl, promethazine **OR** ondansetron, acetaminophen, oxyCODONE **OR** oxyCODONE, diazePAM, methocarbamol (ROBAXIN) 1,000 mg in sodium chloride 0.9 % 100 mL IVPB **FOLLOWED BY** methocarbamol, cetirizine, potassium chloride **OR** potassium alternative oral replacement **OR** potassium chloride, magnesium sulfate    Objective:   Vitals:   T-max:  Patient Vitals for the past 8 
4 Eyes Skin Assessment     NAME:  Marko Bermudez  YOB: 1950  MEDICAL RECORD NUMBER:  [id number]    The patient is being assessed for  Admission    I agree that at least one RN has performed a thorough Head to Toe Skin Assessment on the patient. ALL assessment sites listed below have been assessed.      Areas assessed by both nurses:    Head, Face, Ears, Shoulders, Back, Chest, Arms, Elbows, Hands, Sacrum. Buttock, Coccyx, Ischium, Legs. Feet and Heels, and Under Medical Devices         Scattered bruises and abrasions   Old sx scar to L knee   Old sx scar to lower back   Spinal stimulator lower R back    Does the Patient have a Wound? No noted wound(s)       Yunier Prevention initiated by RN: Yes  Wound Care Orders initiated by RN: No    Pressure Injury (Stage 3,4, Unstageable, DTI, NWPT, and Complex wounds) if present, place Wound referral order by RN under : No    New Ostomies, if present place, Ostomy referral order under : No     Nurse 1 eSignature: Electronically signed by Carol Enriquez RN on 3/8/24 at 2:08 AM EST    **SHARE this note so that the co-signing nurse can place an eSignature**    Nurse 2 eSignature: Electronically signed by Sandor Clinton RN on 3/8/24 at 3:12 AM EST    
Cefazolin administered by Dheeraj Jang was supervised/assisted by this Clinical Instructor Soraida Newton RN BSN.   
Department of Physical Medicine & Rehabilitation  Progress Note    Patient Identification:  Marko Bermudez  [id number]  : 1950  Admit date: 3/7/2024    Chief Complaint: Ambulatory dysfunction    Subjective:   This is a 72yo who came into the hospital for spinal stenosis surgery. He is currently s/p THORACIC 6 - THORACIC 12 EXTENSION OF PREVIOUS SPINAL FUSION TO THORACIC 4 WITH DECOMPRESSION, REMOVAL OF SPINAL CORD STIMULATOR (N/A) and doing well in recovery. He is agreeable to ARU ans states he has been on an ARU in the past.    Internval Hx:  NAEO, no significant changes today. Reports feeling well, continues to report resolution of BLE spasms/fasciculations    ROS: No f/c, n/v, cp, numbness, tingling, weakness, changes in urination/BM.     Objective:  Patient Vitals for the past 24 hrs:   BP Temp Temp src Pulse Resp SpO2   24 0551 126/70 97.6 °F (36.4 °C) Oral 56 18 98 %   24 0020 -- -- -- 62 16 95 %   24 2321 130/61 98 °F (36.7 °C) Oral 56 18 93 %   24 -- -- -- 67 18 96 %   24 1946 127/70 98.2 °F (36.8 °C) Oral 71 18 96 %   24 1600 132/65 97.8 °F (36.6 °C) Oral 61 18 97 %   24 1215 103/60 97.2 °F (36.2 °C) Oral 69 18 95 %       Const: Alert. No distress, pleasant.  HEENT: Normocephalic, atraumatic. Normal sclera/conjunctiva. MMM.   CV: Regular rate and rhythm.   Resp: No respiratory distress. Lungs CTAB.   Abd: Soft, nontender, nondistended, NABS+   Ext: No edema.   Neuro: Alert, oriented, appropriately interactive. BUE 5/5, BLE 5/5 to extension, RLE 4+/5 flexion, LLE 5/5 flexion, sensation intact,  Psych: Cooperative, appropriate mood and affect    Laboratory data: Available via EMR.   Last 24 hour lab  Recent Results (from the past 24 hour(s))   POCT Glucose    Collection Time: 24  8:22 PM   Result Value Ref Range    POC Glucose 145 (H) 70 - 99 mg/dl    Performed on ACCU-CHEK        Therapy progress:  PT  Position Activity Restriction  Other 
From OR to PACU.   Post op PROCEDURES  THORACIC 6 - THORACIC 12 EXTENSION OF PREVIOUS SPINAL FUSION TO THORACIC 4 WITH DECOMPRESSION, REMOVAL OF SPINAL CORD STIMULATOR  Danny Wilson MD  Report received from CRNA at bedside.   Patient restless in bed- fentanyl IV given for discomfort.  Midline back incision with gauze/tape dressing intact.   Right lower back dressing gauze/tape intact.   Hemovac to back incision compressed with moderate amount of bloody drainage.   Patient moving all extremities.   Peripheral pulses present.   Coreas catheter in place with tea colored urine.   
Left radial A- line dc/d without difficulty- pressure held for 15 minutes and bandage applied.     Patient resting eyes closed, follows commands. Denies any numbness or tingling in lower extremities. Reports that prior to surgery he was using walker for ambulation.     VS stable.   Patient will return to room #3943 as scheduled.   
Neurosurgery Progress Note    Patient seen and examined on 03/09/24. No acute events overnight. Neurologically stable on exam.       A/P: 74 yo man with previous T9-pelvis and proximal junctional kyphosis with severe stenosis at T8-9 with myelopathy    -Neuro stable  -Pain control   -Muscle relaxants prn  -NPO for procedure  -Preop labs reviewed  -Plan to OR with Dr. Marie for extension of fusion to T6 with T8-9 decompression and possible removal of SCS. I also discussed the risks, benefits, and particulars with the patient. I reviewed Dr. Marie's recommendations and answered all questions.   -Plan to OR later this afternoon. Postop to neurosurgical boggs.        Danny Wilson MD, PhD  [address], [address]  [place], [place], [address] [phone] (c), [phone] (o)       
Occupational Therapy  Facility/Department: [facility] ORTHO/NEURO  Occupational Therapy Initial Assessment & Treatment    Name: Marko Bermudez  : 1950  MRN: [id number]  Date of Service: 3/10/2024    Discharge Recommendations:  IP Rehab  OT Equipment Recommendations  Equipment Needed: No  Other: defer to next level of care       Patient Diagnosis(es): The primary encounter diagnosis was Difficulty walking. A diagnosis of Spinal stenosis, thoracic was also pertinent to this visit.  Past Medical History:  has a past medical history of Arthritis, Asthma, Gout, Hyperlipidemia, Hypertension, Restless leg, and SOB (shortness of breath).  Past Surgical History:  has a past surgical history that includes back surgery; Neck surgery; Knee arthroscopy; hernia repair; Toe Surgery; Knee arthroscopy ([id number]); Carpal tunnel release; Rotator cuff repair; Cystoscopy; cervical fusion; Knee Arthroplasty (Left, 13); back surgery (may 2013); other surgical history (2013); shoulder surgery; Shoulder arthroscopy (Right, 3/26/14); Colonoscopy; and shoulder surgery (Right, 05/10/2017).    Treatment Diagnosis: impaired ADL and fxl mobilty      Assessment   Performance deficits / Impairments: Decreased functional mobility ;Decreased ADL status;Decreased endurance  Assessment: Pt is 73y M from home w/ wife - at baseline pt is IND w/ ADL and fxl mobilty. Pt has demo decline in IND d/t weakness and pain related to walking and BADL tasks- and has needed A from family PRN for last two weeks. Pt is POD1: THORACIC 6 - THORACIC 12 EXTENSION OF PREVIOUS SPINAL FUSION TO THORACIC 4 WITH DECOMPRESSION, REMOVAL OF SPINAL CORD STIMULATOR, had previous back sx . Pt presently is Min/ModA for transfers, Cecelia walking to/from bathroom w/ RW demo'ing increased effort and slow pace, and MaxA toileting. Pt is functioning well below baseline and may benefit from skilled OT while inpt to maximzie safety, IND and fxl act lucy needed for ADL and 
Occupational Therapy  Facility/Department: [facility] ORTHO/NEURO  Occupational Therapy Treatment     Name: Marko Bermudez  : 1950  MRN: [id number]  Date of Service: 3/11/2024    Discharge Recommendations:  IP Rehab  At baseline this patient was IND with functional mobility & ADL's. The current hospitalization has resulted in the patient now being limited with all aspects of mobility, negatively impacting the patient's functional independence, ability to safely access their home environment, and ability to complete valued daily tasks and life roles. Marko Bermudez is motivated for recovery and demonstrates the ability to tolerate inpatient rehabilitation 3 hours/day, 5 days/week for optimal return to functional independence, quality of life, and decreased caregiver burden.            Patient Diagnosis(es): The primary encounter diagnosis was Difficulty walking. A diagnosis of Spinal stenosis, thoracic was also pertinent to this visit.  Past Medical History:  has a past medical history of Arthritis, Asthma, Gout, Hyperlipidemia, Hypertension, Restless leg, and SOB (shortness of breath).  Past Surgical History:  has a past surgical history that includes back surgery; Neck surgery; Knee arthroscopy; hernia repair; Toe Surgery; Knee arthroscopy ([id number]); Carpal tunnel release; Rotator cuff repair; Cystoscopy; cervical fusion; Knee Arthroplasty (Left, 13); back surgery (may 2013); other surgical history (2013); shoulder surgery; Shoulder arthroscopy (Right, 3/26/14); Colonoscopy; shoulder surgery (Right, 05/10/2017); and laminectomy (N/A, 3/9/2024).    Treatment Diagnosis: impaired ADL and fxl mobilty      Assessment   Performance deficits / Impairments: Decreased functional mobility ;Decreased ADL status;Decreased endurance  Assessment: Pt from home w/ wife - at baseline pt is IND w/ ADL and fxl mobiltiy. S/p THORACIC 6 - THORACIC 12 EXTENSION OF PREVIOUS SPINAL FUSION TO THORACIC 4 WITH DECOMPRESSION, 
PACU Transfer Note    Vitals:    03/09/24 1615   BP: 139/77   Pulse: 87   Resp: 13   Temp: 98.2 °F (36.8 °C)   SpO2: 91%       In: 1855 [P.O.:50; I.V.:1555]  Out: 650 [Urine:500]    Pain assessment:  none  VS stable Moving all extremities without difficulty. Soraida HOBSON at bedside - report given. Patient to room # 3458 telemetry as ordered.   Pain Level: 4    Report given to Receiving unit RN.    3/9/2024 4:27 PM       
Patient is alert and orient X$, VSS, patient is ambulating assist X2 gait belt walker patient is tolerating a reg diet, patient has the urge to void meraz cath, was removed this am, patient had family at bedside wants them to leave before he tries, patient went down to radiology for his x-rays, patient is tolerating a reg adult diet, patient has all standard fall precautions in place, call light and tray table are in reach.  
Patient is alert and orient X4, VSS, patient was NPO  patient went down for surgery at 11:00a.m. Patient is voiding per urinal patient had all standard fall precautions in place, until he went down for surgery patient will be coming up from PACU soon, patient has family at the bedside.  
Patient is alert and oriented x4. No acute changes or c/o pain. Patient is having leg jerks that alternate to the L and R side. Patient states he has them at home and was told they are muscle spasms. Provider notified via secure message. Currently awaiting neurosurgery consult to determine surgical interventions. Patient is currently NPO and on bedrest. Voiding via urinal and bedpan. No concerns at this time. Safety precautions are in place and call light is within reach.  
Patient is alert and oriented x4. VSS on room air. No changes in mental status this shift. Patient worked with PT/OT this shift and cleaned up in the bathroom and ambulated around the room. Sat up in the chair for most of the shift. Some complaints of pain in back, pain being managed per MAR. No side effects noted. Voiding well via urinal in restroom. Tolerating diet well. Ambulating x1 with gait belt and walker.     Patient is currently resting in bed with bed alarm on for safety. Call light within reach and all fall precautions in place. Plan of care ongoing.    Electronically signed by Dilia Yates RN on 3/11/2024 at 8:07 PM   
Patient is alert and oriented x4. VSS on room air. No changes in mental status this shift. Patient worked with PT/OT this shift, ambulated around room. Sat up in the chair for most of the shift. Some complaints of pain in back, pain being managed per MAR. No side effects noted. Voiding well via BRP, one large bm this shift. Tolerating diet well. Ambulating x1 with gait belt and walker. Incision CDI, hemovac remains in place.     Patient is currently resting in bed with bed alarm on for safety. Call light within reach and all fall precautions in place. Plan of care ongoing.    Electronically signed by Dilia Yates RN on 3/12/2024 at 6:27 PM       
Patient repositioned for comfort, emotional support given.   Albuterol treatment given per respiratory therapist at bedside.     VS stable.   Patient moving all extremities without difficulty.   
Physical Therapy/Occupational therapy  No treatment    Chart reviewed.  Attempted PT tx this PM.  Pt states \"I can't do any walking.  I have a suppository in place.\"  Pt declines need to have BM, prefers to stay seated and wait a while longer before attempting to go to the bathroom.  Note plan is to d/c to ARU this PM.  Will f/u later date per POC if d/c is delayed.    Lauren Schilling, PT, DPT  [id number]        
Progress Note    Admit Date: 3/7/2024  Day: 1    Diet: Diet NPO Exceptions are: Sips of Water with Meds    CC: Back pain and ambulatory dysfunction     Interval history:   Patient seen and examined   Has difficulty walking since 2 weeks   Wife present at bedside  Waiting on MRI has spinal simulator card with him.         HPI: 73 y.o. male who presented to [facility] with ambulatory dysfunction.  PMHx significant for previous spinal fusion with neuropathy hypertension diabetes.         Medications:     Scheduled Meds:   dexAMETHasone  4 mg IntraVENous Q6H    atorvastatin  40 mg Oral Daily    carbidopa-levodopa  1 tablet Oral Nightly    fluticasone  1 spray Each Nostril BID    gabapentin  600 mg Oral 4x Daily    rOPINIRole  3 mg Oral TID    sodium chloride flush  5-40 mL IntraVENous 2 times per day    enoxaparin  30 mg SubCUTAneous BID     Continuous Infusions:   sodium chloride      sodium chloride 75 mL/hr at 03/08/24 0114     PRN Meds:sodium chloride flush, sodium chloride, potassium chloride **OR** potassium alternative oral replacement **OR** potassium chloride, magnesium sulfate, ondansetron **OR** ondansetron, polyethylene glycol, acetaminophen **OR** acetaminophen, HYDROmorphone, oxyCODONE    Objective:   Vitals:   T-max:  Patient Vitals for the past 8 hrs:   BP Temp Temp src Pulse Resp SpO2   03/08/24 0359 133/72 97.8 °F (36.6 °C) Oral 66 16 94 %   03/08/24 0305 -- -- -- -- 16 --       Intake/Output Summary (Last 24 hours) at 3/8/2024 0903  Last data filed at 3/8/2024 0607  Gross per 24 hour   Intake 50 ml   Output 500 ml   Net -450 ml       Review of Systems    Physical Exam  Vitals reviewed.   Constitutional:       Appearance: Normal appearance.   Cardiovascular:      Rate and Rhythm: Normal rate and regular rhythm.   Pulmonary:      Effort: Pulmonary effort is normal.      Breath sounds: Normal breath sounds.   Abdominal:      General: Abdomen is flat.      Palpations: Abdomen is soft.   Skin:     
Pt A&Ox4. VSS on RA. No acute changes this shift. NPO since 0000. Procedure consent signed. One hibaclens bath completed. Pt remains on bedrest. Voiding adequately via urinal. Denies needs at this time. Fall precautions in place, call light within reach.   
Pt admitted and oriented to room 5527. 4 yes completed.    Pt A&Ox4. VSS on RA. No acute changes this shift. Pain managed via medication per MAR. Bedrest at this time. Voiding via urinal. Tolerating IVF. NPO. Denies needs at this time. Fall precautions in place, call light within reach.   
Pt is alert and oriented X4. VS taken and recorded. Spo2 maintained at RA. Incision site is CDI and Hemovac drain continue. Pain has been controlled with MAR. Is on oral feed and tolerating well. Pt voiding via BRP. No BM this shift. All fall precautions in place, call light within reach, free from fall injury. Plan of care ongoing.   
Pt is alert and oriented x4. Vss. Pt is a x1 walker/gb assist with brp. Incision to back cdi. Hemovac drain in place. All safety precautions in place. Plan of care continues.   
Pt. Oriented x4. VSS on RA. Pt. Managing pain per MAR. Dressing to back CDI. Drain put out 250 mL @2200, NCC NP notified, no new orders. All fall precautions in place and call light is within reach.   
Pt. Oriented x4. VSS on RA. Pt. Managing pain per MAR. Dressings CDI. All fall precautions in place and call light is within reach.   
Update 3/13/2024: precert approved can admit today    [facility] - Acute Rehab Unit   After review, this patient is felt to be:       [x]  Dr. Phelps states this patient is appropriate for rehab.     []  Not appropriate for Acute Inpatient Rehab    []  Referral received and ARU reviewing patient     Precert initiated 3/11/2024 for ARU admission. Pt in agreement per  and CL's conversation with pt this AM. Ref#: [id number]     Will notify CM/SW with further updates. Thank you for the referral.    Sherice AVILA OTR/L  Clinical Liaison- [facility]   (P): [phone]  (F): [phone]   
Handicap height  Bathroom Equipment: Grab bars in shower, Shower chair  Home Equipment: Walker, rolling, Grab bars, Wheelchair-manual, Reacher (upright walker)  Has the patient had two or more falls in the past year or any fall with injury in the past year?: No (1 fall last week)  ADL Assistance: Independent  Homemaking Assistance: Independent  Homemaking Responsibilities: Yes (personal laundry)  Ambulation Assistance: Independent  Transfer Assistance: Independent  Active : Yes  Occupation: Retired  Type of Occupation: HR  Leisure & Hobbies: family (g'dtr), yardwork  Vision/Hearing     WFL  Cognition  WFL    Objective                                  Transfers  Sit to Stand: Minimal Assistance (from chair, cues for hand placement)  Stand to Sit: Minimal Assistance (cues for safety and hand placement)  Comment: Performed repeated sit to stand from chair x 5 reps with min assist.  Increased effort by end of reps  Ambulation  Device: Rolling Walker  Assistance: Minimal assistance (progressing to mod assist with increased distance/fatigue)  Quality of Gait: heavy UE support on walker, decreased motor control BLE, decreased stability in stance phase bilat  Distance: 32'        Exercise Treatment: Pt performed LE ROM exercises - AP, LAQ, marching x 10 reps ea.  Instructed pt to perform throughout the day but discussed slow, controlled quality movement vs quantity.  Pt verbalized understanding        OutComes Score                                                  AM-PAC - Mobility    AM-PAC Basic Mobility - Inpatient   How much help is needed turning from your back to your side while in a flat bed without using bedrails?: A Little  How much help is needed moving from lying on your back to sitting on the side of a flat bed without using bedrails?: A Little  How much help is needed moving to and from a bed to a chair?: A Little  How much help is needed standing up from a chair using your arms?: A Little  How much help 
flat bed without using bedrails?: A Little  How much help is needed moving from lying on your back to sitting on the side of a flat bed without using bedrails?: A Little  How much help is needed moving to and from a bed to a chair?: A Little  How much help is needed standing up from a chair using your arms?: A Little  How much help is needed walking in hospital room?: A Lot  How much help is needed climbing 3-5 steps with a railing?: Total  AM-PAC Inpatient Mobility Raw Score : 15  AM-PAC Inpatient T-Scale Score : 39.45  Mobility Inpatient CMS 0-100% Score: 57.7  Mobility Inpatient CMS G-Code Modifier : CK         Therapy Time   Individual Concurrent Group Co-treatment   Time In 0852         Time Out 0934         Minutes 42                 Rosi Turner, PT           
roxicodone  Robaxin/valium for spasms   Incisional Care: cleanse daily with soap/water, pat dry with clean towel and paint with CHG swab  Brace: TLSO brace when OOB (ordered)  Dispo Planning: dc to ARU today     Patient was discussed with Dr. Wilson who agrees with above assessment and plan.     Electronically signed by: [place] Bey, JOSÉ MIGUEL - NP, 3/12/2024 9:41 AM   Neurosurgery Nurse Practitioner  [phone]      __________________________________________________________________    I saw and examined Marko Bermudez on 03/12/24. I agree with the assessment and plan as detailed above.     Danny Wilson MD, PhD  [address], [address]  [place], [place], [address] [phone] (c), [phone] (o)     
s/p Procedure(s) (LRB): THORACIC 6 - THORACIC 12 EXTENSION OF PREVIOUS SPINAL FUSION TO THORACIC 4 WITH DECOMPRESSION, REMOVAL OF SPINAL CORD STIMULATOR (N/A)    Plan:  Neurologic exams frequency: Q4H  For change in exam MUST contact neurosurgery team along with critical care or primary team  Myelopathy concurrent with and due to spinal stenosis of thoracic region:  - s/p T6 - T12 EXTENSION OF PREVIOUS SPINAL FUSION TO T4 WITH DECOMPRESSION, REMOVAL OF SPINAL CORD STIMULATOR  - Cefazolin x 6 doses post op  - Decadron taper  - Continue drain until output <30 mL Q8H  - DC Coreas catheter within 24 hours post-op  - Tylenol & PRN Roxicodone and Dilaudid for pain control  - Robaxin & PRN Valium for muscle spasms  - Glycolax and Senokot-S for bowel regimen  Mobility: PT/OT as tolerates  Diet: Advance as tolerates  DVT Prophylaxis: SCD's & Lovenox  Please call with any questions or decline in neurological status    DISPO: Remain inpatient from neurosurgery standpoint. Dispo timing to be determined by primary team once patient is medically stable for discharge.    Patient was seen and examined with Dr. Wilson who agrees with above assessment and plan.     Electronically signed by: JOSÉ MIGUEL Blake CNP, APRN-CNP, 3/10/2024 11:26 AM  [phone]  
shoulder pain    Status post neck surgery by Dr. Augustin    S/P right rotator cuff repair, arthroscopy and debridement 5/10/2017    Ambulatory dysfunctio       Plan:  Admit to ARU today      Dr. Guerrero Pgy2      This patient has been seen, examined, and discussed with the resident. I was part of the key critical services provided to the patient. I agree with the residents documentation. This note may have been altered to reflect my own examination findings, impression, and recommendations.       Mac Phelps D.O. M.P.H  PM&R  3/12/2024  9:48 AM      
signed by: Mauricio Price, APRN - CNP, 3/13/2024 10:51 AM   Neurosurgery Nurse Practitioner  [phone]    __________________________________________________________________    I saw and examined Marko Cedillopedro luisyuly on 03/13/24. I agree with the assessment and plan as detailed above.     Danny Wilson MD, PhD  [address], [address]  [place], [address] [phone] (c), [phone] (o)     
Good  Standing - Static: Good  Standing - Dynamic: Good;Fair         AM-PAC - Mobility    AM-PAC Basic Mobility - Inpatient   How much help is needed turning from your back to your side while in a flat bed without using bedrails?: A Little  How much help is needed moving from lying on your back to sitting on the side of a flat bed without using bedrails?: A Little  How much help is needed moving to and from a bed to a chair?: A Little  How much help is needed standing up from a chair using your arms?: A Lot  How much help is needed walking in hospital room?: A Little  How much help is needed climbing 3-5 steps with a railing?: Total  AM-PAC Inpatient Mobility Raw Score : 15  AM-PAC Inpatient T-Scale Score : 39.45  Mobility Inpatient CMS 0-100% Score: 57.7  Mobility Inpatient CMS G-Code Modifier : CK      Goals  Short Term Goals  Time Frame for Short Term Goals: d/c  Short Term Goal 1: sup<>sit supervision  Short Term Goal 2: sit<>stand supervision  Short Term Goal 3: amb 150' with RW and supervision  Patient Goals   Patient Goals : return home when able       Education  Patient Education  Education Given To: Patient  Education Provided: Role of Therapy;Transfer Training  Education Method: Demonstration;Verbal  Barriers to Learning: None  Education Outcome: Verbalized understanding;Demonstrated understanding      Therapy Time   Individual Concurrent Group Co-treatment   Time In 0745         Time Out 0823         Minutes 38         Timed Code Treatment Minutes:  23    Total Treatment Minutes:  38     If the patient is discharged before the next treatment session, this note will serve as the discharge summary.     Reji Jacome, PT, DPT       
subcutaneous fluid collection in the back. This may reflect a postoperative seroma. 4.  No acute fracture or traumatic subluxation. Electronically signed by Jono Valladares MD      CBC:   Recent Labs     03/07/24 2218 03/08/24  0604 03/09/24  0552   WBC 8.4 8.7 14.1*   HGB 16.0 16.0 16.5    153 164     BMP:    Recent Labs     03/07/24 2218 03/08/24  0604 03/09/24  0552    142 140   K 4.1 4.9 4.6    107 105   CO2 26 21 22   BUN 15 14 25*   CREATININE 1.4* 1.3 1.2   GLUCOSE 85 99 124*     Hepatic:   Recent Labs     03/08/24 0604 03/09/24  0552   AST 42* 53*   ALT 30 19   BILITOT 0.5 0.7   ALKPHOS 97 105     Lipids: No results found for: \"CHOL\", \"HDL\", \"TRIG\"  Hemoglobin A1C: No results found for: \"LABA1C\"  TSH: No results found for: \"TSH\"  Troponin: No results found for: \"TROPONINT\"  Lactic Acid: No results for input(s): \"LACTA\" in the last 72 hours.  BNP: No results for input(s): \"PROBNP\" in the last 72 hours.  UA:  Lab Results   Component Value Date/Time    NITRU Negative 01/16/2013 02:20 PM    COLORU Yellow 01/16/2013 02:20 PM    PHUR 6.5 01/16/2013 02:20 PM    CLARITYU Clear 01/16/2013 02:20 PM    SPECGRAV 1.015 01/16/2013 02:20 PM    LEUKOCYTESUR Negative 01/16/2013 02:20 PM    UROBILINOGEN 0.2 01/16/2013 02:20 PM    BILIRUBINUR Negative 01/16/2013 02:20 PM    BLOODU Negative 01/16/2013 02:20 PM    GLUCOSEU Negative 01/16/2013 02:20 PM    KETUA Negative 01/16/2013 02:20 PM     Urine Cultures: No results found for: \"LABURIN\"  Blood Cultures:   Lab Results   Component Value Date/Time    BC  03/07/2024 11:42 PM     No Growth to date.  Any change in status will be called.     Lab Results   Component Value Date/Time    BLOODCULT2  03/07/2024 11:42 PM     No Growth to date.  Any change in status will be called.     Organism: No results found for: \"ORG\"      Electronically signed by Nilay Price MD on 3/9/2024 at 3:31 PM

## 2024-03-14 NOTE — CONSULTS
Nutrition Note       NUTRITION RECOMMENDATIONS:   1. PO Diet: Continue current diet    2. ONS: not indicated    NUTRITION ASSESSMENT:  Nutritional summary & status: Consult for new admit to ARU.  Pt POD#5 extension of prior T6-T12 fusion up to T4.  EMR shows no recent wt loss.  PO intake >76% through adm.  No nutrition concerns at this time.   Admission/PMH: thoracic myelopathy // HLD, HTN    MALNUTRITION ASSESSMENT  Context of Malnutrition: Acute Illness   Malnutrition Status: No malnutrition    NUTRITION DIAGNOSIS   No nutrition diagnosis at this time     NUTRITION INTERVENTION  Food and/or Nutrient Delivery:  Continue Current Diet  Nutrition Education/Counseling:  Education not indicated       The patient will still be monitored per nutrition standards of care.  Consult dietitian if nutrition interventions essential to patient care is needed.     Hellen Wolfe, MS, RD, LD  Shilo:  [phone]  Office:  [phone]

## 2024-03-14 NOTE — PROGRESS NOTES
Occupational Therapy  Facility/Department: [facility] ACUTE REHAB UNIT  Occupational Therapy Initial Assessment/Treatment    Name: Marko Bermudez  : 1950  MRN: [id number]  Date of Service: 3/14/2024    Discharge Recommendations:  Continue to assess pending progress, 24 hour supervision or assist, Home with Home health OT  OT Equipment Recommendations  Equipment Needed: Yes  Mobility Devices: ADL Assistive Devices  ADL Assistive Devices: Reacher       Patient Diagnosis(es): There were no encounter diagnoses.  Past Medical History:  has a past medical history of Arthritis, Asthma, Gout, Hyperlipidemia, Hypertension, Restless leg, and SOB (shortness of breath).  Past Surgical History:  has a past surgical history that includes back surgery; Neck surgery; Knee arthroscopy; hernia repair; Toe Surgery; Knee arthroscopy ([id number]); Carpal tunnel release; Rotator cuff repair; Cystoscopy; cervical fusion; Knee Arthroplasty (Left, 13); back surgery (may 2013); other surgical history (2013); shoulder surgery; Shoulder arthroscopy (Right, 3/26/14); Colonoscopy; shoulder surgery (Right, 05/10/2017); and laminectomy (N/A, 3/9/2024).    Treatment Diagnosis: impaired ADL and fxl mobilty 2/2 lumbar sx      Assessment   Performance deficits / Impairments: Decreased functional mobility ;Decreased ADL status;Decreased endurance;Decreased balance;Decreased safe awareness;Decreased high-level IADLs  Assessment: Pt is 73y M from home w/ wife - at baseline pt is IND w/ ADL and fxl mobilty. S/p THORACIC 6 - THORACIC 12 EXTENSION OF PREVIOUS SPINAL FUSION TO THORACIC 4 WITH DECOMPRESSION, REMOVAL OF SPINAL CORD STIMULATOR, had previous back sx . Pt currently requires CGA for transfers and short distance mobility using RW and max A for LB bathing and dressing due to BLE weakness, spinal precautions and decreased dynamic balance. Ptis very motivated and wife can provide 24 hr spvn upon dc. Pt would benefit from continued  i'ly  Short Term Goal 5: Pt will complete toileting and toilet transfer with mod I  Patient Goals   Patient goals : \"to walk out of here\"       Therapy Time   Individual Second session Group Co-treatment   Time In 0945  1600       Time Out 1115  1615       Minutes 90  15       Timed Code Treatment Minutes: 90 Minutes+ 15 min  Total: 105 Minutes       Yuly Yanes, OT

## 2024-03-14 NOTE — CONSULTS
Clinical Pharmacy Progress Note  Medication History     Pharmacy consulted to assist with medication reconciliation by Dr Phelps.    Medications pt was on at home prior to initial admission to [facility] 3/7/24:    allopurinol (ZYLOPRIM) 300 MG tablet Take 1 tablet by mouth daily   amLODIPine (NORVASC) 5 MG tablet Take 1 tablet by mouth daily   atorvastatin (LIPITOR) 40 MG tablet    azelastine (ASTELIN) 0.1 % nasal spray 1 spray by Nasal route 2 times daily Use in each nostril as directed   b complex vitamins capsule Take 1 capsule by mouth daily   carbidopa-levodopa (SINEMET)  MG per tablet Take 1 tablet by mouth at bedtime   cetirizine (ZYRTEC) 5 MG tablet Take 1 tablet by mouth daily   clonazepam (KLONOPIN) 1 MG tablet Take 1 mg by mouth daily.    doxazosin (CARDURA) 4 MG tablet Take 1 tablet by mouth nightly   empagliflozin (JARDIANCE) 10 MG tablet Take 1 tablet by mouth daily   fluticasone (FLONASE) 50 MCG/ACT nasal spray 1 spray by Each Nostril route in the morning and at bedtime   fluticasone-salmeterol (ADVAIR HFA) 230-21 MCG/ACT inhaler Inhale 2 puffs into the lungs 2 times daily   gabapentin (NEURONTIN) 600 MG tablet Take 1 tablet by mouth 4 times daily.   losartan (COZAAR) 100 MG tablet Take 0.5 tablets by mouth daily   meloxicam (MOBIC) 15 MG tablet    montelukast (SINGULAIR) 10 MG tablet Take 10 mg by mouth nightly   omeprazole (PRILOSEC) 20 MG delayed release capsule Take 2 capsules by mouth daily   rOPINIRole (REQUIP) 1 MG tablet Take 3 tablets by mouth 3 times daily   spironolactone (ALDACTONE) 25 MG tablet Take 1 tablet by mouth daily   traZODone (DESYREL) 100 MG tablet Take 1 tablet by mouth nightly     Medications patient was intended to be on at time of transfer to [facility] ARU:    Current Outpatient Medications   Medication Instructions    acetaminophen (TYLENOL) 1,000 mg, Oral, EVERY 6 HOURS PRN, arthritis    allopurinol (ZYLOPRIM) 300 mg, Oral, DAILY,      amLODIPine (NORVASC) 5 mg, Oral, DAILY

## 2024-03-14 NOTE — PLAN OF CARE
Problem: Discharge Planning  Goal: Discharge to home or other facility with appropriate resources  3/13/2024 2356 by Rupesh Vela RN  Outcome: Progressing  3/13/2024 1953 by Joyce Sharp RN  Outcome: Progressing  Flowsheets (Taken 3/13/2024 1953)  Discharge to home or other facility with appropriate resources:   Identify barriers to discharge with patient and caregiver   Arrange for needed discharge resources and transportation as appropriate   Identify discharge learning needs (meds, wound care, etc)     Problem: Safety - Adult  Goal: Free from fall injury  3/13/2024 2356 by Rupesh Vela RN  Outcome: Progressing  Flowsheets (Taken 3/13/2024 2355)  Free From Fall Injury: Instruct family/caregiver on patient safety  3/13/2024 1953 by Joyce Sharp, RN  Outcome: Progressing  Note: Oriented to call light use, camera on, alarms on.     Problem: ABCDS Injury Assessment  Goal: Absence of physical injury  3/13/2024 2356 by Rupesh Vela RN  Outcome: Progressing  Flowsheets (Taken 3/13/2024 2355)  Absence of Physical Injury: Implement safety measures based on patient assessment  3/13/2024 1953 by Joyce Sharp, RN  Outcome: Progressing  Flowsheets (Taken 3/13/2024 1953)  Absence of Physical Injury: Implement safety measures based on patient assessment  Note: TLSO back brace on when OOB, gait belt and walker, assist x 1 with ambulation.

## 2024-03-14 NOTE — H&P
Department of Physical Medicine & Rehabilitation  History & Physical      Patient Identification:  Marko Bermudez  : 1950  Admit date: 3/13/2024   Attending provider: Mac Phelps DO        Primary care provider: Emmanuel Russo     Chief Complaint: LB pain     History of Present Illness/Hospital Course:  This is a 72yo M who came into the hospital for spinal stenosis surgery. He is currently s/p THORACIC 6 - THORACIC 12 EXTENSION OF PREVIOUS SPINAL FUSION TO THORACIC 4 WITH DECOMPRESSION, REMOVAL OF SPINAL CORD STIMULATOR (N/A) and doing well in recovery. He is agreeable to ARU ans states he has been on an ARU in the past.       Past Medical History:   Diagnosis Date    Arthritis     Asthma     Gout     Hyperlipidemia     Hypertension     Restless leg     SOB (shortness of breath)     \"unexplained\", pt states this has been worked up       Prior Level of Function:  Independent for mobility, ADLs, and IADLs    Current Level of Function:  Mod assist     Pertinent Social History:  Support: family at home    Past Medical History:   Diagnosis Date    Arthritis     Asthma     Gout     Hyperlipidemia     Hypertension     Restless leg     SOB (shortness of breath)     \"unexplained\", pt states this has been worked up     Fall in past year: No    Past Surgical History:   Procedure Laterality Date    BACK SURGERY      three times    BACK SURGERY  may 2013    CARPAL TUNNEL RELEASE      CERVICAL FUSION      COLONOSCOPY      CYSTOSCOPY      HERNIA REPAIR      ventral    KNEE ARTHROPLASTY Left 13    KNEE ARTHROSCOPY      left    KNEE ARTHROSCOPY  [id number]    left    LAMINECTOMY N/A 3/9/2024    THORACIC 6 - THORACIC 12 EXTENSION OF PREVIOUS SPINAL FUSION TO THORACIC 4 WITH DECOMPRESSION, REMOVAL OF SPINAL CORD STIMULATOR performed by Danny Wilson MD at [facility] OR    NECK SURGERY      twice    OTHER SURGICAL HISTORY  2013    LEFT SHOULDER ARTHROSCOPY, DEBRIDEMENT, ROTATOR CUFF REPAIR,    ROTATOR CUFF  period of time. I will be completing a detailed individualized  Plan of Care for this patient by day four of the patients stay based upon the Preadmission Screen, this Post-Admission Evaluation, and the therapy evaluations.     Barriers: Decreased functional mobility, medical comorbidities  Services Required: PT, OT  Goals: mod I  Prognosis: Good  Anticipated Dispo: home  ELOS: TBD    Rehabilitation Diagnosis:   Spinal Cord Dysfunction - Non-traumatic, 4.130, Other NTSCI      Assessment and Plan:  s/p THORACIC 6 - THORACIC 12 EXTENSION OF PREVIOUS SPINAL FUSION TO THORACIC 4 WITH DECOMPRESSION, REMOVAL OF SPINAL CORD STIMULATOR (N/A)  - PT/OT  - pain control  -  wound care   - drain in place     HTN  - norvasc, losartan, cardura, spirolactone     HLD  - lipitor     Gout  - allopurinol     Neurpathic pain  - continue gabapentin     Impairments: Decreased functional mobility, Decreased ADLs    Bladder - high risk retention - Monitor PVRs, SC prn >300cc    Bowel - high risk constipation - colace BID, PRN miralax and MoM. follow bowel movements. Enema or suppository if needed.     Safety - fall precautions    PPx  DVT: lovenox  GI: pantoprazole    FULL CODE    Mac Phelps D.O. M.P.H  PM&R  3/14/2024  10:34 AM

## 2024-03-15 LAB
ANION GAP SERPL CALCULATED.3IONS-SCNC: 8 MMOL/L (ref 3–16)
BASOPHILS # BLD: 0 K/UL (ref 0–0.2)
BASOPHILS NFR BLD: 0 %
BUN SERPL-MCNC: 32 MG/DL (ref 7–20)
CALCIUM SERPL-MCNC: 8.8 MG/DL (ref 8.3–10.6)
CHLORIDE SERPL-SCNC: 105 MMOL/L (ref 99–110)
CO2 SERPL-SCNC: 26 MMOL/L (ref 21–32)
CREAT SERPL-MCNC: 1.2 MG/DL (ref 0.8–1.3)
DEPRECATED RDW RBC AUTO: 12.9 % (ref 12.4–15.4)
EOSINOPHIL # BLD: 0.1 K/UL (ref 0–0.6)
EOSINOPHIL NFR BLD: 1.2 %
GFR SERPLBLD CREATININE-BSD FMLA CKD-EPI: >60 ML/MIN/{1.73_M2}
GLUCOSE SERPL-MCNC: 90 MG/DL (ref 70–99)
HCT VFR BLD AUTO: 38.5 % (ref 40.5–52.5)
HGB BLD-MCNC: 12.9 G/DL (ref 13.5–17.5)
LYMPHOCYTES # BLD: 1 K/UL (ref 1–5.1)
LYMPHOCYTES NFR BLD: 9.9 %
MCH RBC QN AUTO: 34.3 PG (ref 26–34)
MCHC RBC AUTO-ENTMCNC: 33.6 G/DL (ref 31–36)
MCV RBC AUTO: 102.1 FL (ref 80–100)
MONOCYTES # BLD: 1 K/UL (ref 0–1.3)
MONOCYTES NFR BLD: 10.3 %
NEUTROPHILS # BLD: 7.8 K/UL (ref 1.7–7.7)
NEUTROPHILS NFR BLD: 78.6 %
PLATELET # BLD AUTO: 161 K/UL (ref 135–450)
PMV BLD AUTO: 9.8 FL (ref 5–10.5)
POTASSIUM SERPL-SCNC: 4.6 MMOL/L (ref 3.5–5.1)
RBC # BLD AUTO: 3.77 M/UL (ref 4.2–5.9)
SODIUM SERPL-SCNC: 139 MMOL/L (ref 136–145)
WBC # BLD AUTO: 9.9 K/UL (ref 4–11)

## 2024-03-15 PROCEDURE — 97110 THERAPEUTIC EXERCISES: CPT

## 2024-03-15 PROCEDURE — 6370000000 HC RX 637 (ALT 250 FOR IP): Performed by: PHYSICAL MEDICINE & REHABILITATION

## 2024-03-15 PROCEDURE — 6360000002 HC RX W HCPCS: Performed by: PHYSICAL MEDICINE & REHABILITATION

## 2024-03-15 PROCEDURE — 80048 BASIC METABOLIC PNL TOTAL CA: CPT

## 2024-03-15 PROCEDURE — 85025 COMPLETE CBC W/AUTO DIFF WBC: CPT

## 2024-03-15 PROCEDURE — 97535 SELF CARE MNGMENT TRAINING: CPT

## 2024-03-15 PROCEDURE — 94660 CPAP INITIATION&MGMT: CPT

## 2024-03-15 PROCEDURE — 36415 COLL VENOUS BLD VENIPUNCTURE: CPT

## 2024-03-15 PROCEDURE — 1280000000 HC REHAB R&B

## 2024-03-15 PROCEDURE — 97116 GAIT TRAINING THERAPY: CPT

## 2024-03-15 PROCEDURE — 94640 AIRWAY INHALATION TREATMENT: CPT

## 2024-03-15 PROCEDURE — 97530 THERAPEUTIC ACTIVITIES: CPT

## 2024-03-15 RX ADMIN — LOSARTAN POTASSIUM 50 MG: 25 TABLET, FILM COATED ORAL at 09:29

## 2024-03-15 RX ADMIN — ACETAMINOPHEN 1000 MG: 500 TABLET ORAL at 06:21

## 2024-03-15 RX ADMIN — GABAPENTIN 600 MG: 300 CAPSULE ORAL at 17:35

## 2024-03-15 RX ADMIN — ACETAMINOPHEN 1000 MG: 500 TABLET ORAL at 17:35

## 2024-03-15 RX ADMIN — ENOXAPARIN SODIUM 30 MG: 100 INJECTION SUBCUTANEOUS at 20:34

## 2024-03-15 RX ADMIN — BUDESONIDE 500 MCG: 0.5 INHALANT RESPIRATORY (INHALATION) at 21:02

## 2024-03-15 RX ADMIN — ATORVASTATIN CALCIUM 40 MG: 40 TABLET, FILM COATED ORAL at 09:34

## 2024-03-15 RX ADMIN — AMLODIPINE BESYLATE 5 MG: 5 TABLET ORAL at 09:33

## 2024-03-15 RX ADMIN — GABAPENTIN 600 MG: 300 CAPSULE ORAL at 13:31

## 2024-03-15 RX ADMIN — ROPINIROLE HYDROCHLORIDE 3 MG: 1 TABLET, FILM COATED ORAL at 20:34

## 2024-03-15 RX ADMIN — SPIRONOLACTONE 25 MG: 25 TABLET ORAL at 09:54

## 2024-03-15 RX ADMIN — GABAPENTIN 600 MG: 300 CAPSULE ORAL at 09:32

## 2024-03-15 RX ADMIN — VITAMIN B COMPLEX 1 CAPSULE: at 09:31

## 2024-03-15 RX ADMIN — CARBIDOPA AND LEVODOPA 1 TABLET: 25; 250 TABLET ORAL at 20:43

## 2024-03-15 RX ADMIN — GABAPENTIN 600 MG: 300 CAPSULE ORAL at 20:34

## 2024-03-15 RX ADMIN — METHOCARBAMOL 750 MG: 750 TABLET ORAL at 15:51

## 2024-03-15 RX ADMIN — TRAZODONE HYDROCHLORIDE 100 MG: 50 TABLET ORAL at 20:34

## 2024-03-15 RX ADMIN — ARFORMOTEROL TARTRATE 15 MCG: 15 SOLUTION RESPIRATORY (INHALATION) at 21:02

## 2024-03-15 RX ADMIN — DOXAZOSIN 4 MG: 2 TABLET ORAL at 20:34

## 2024-03-15 RX ADMIN — ARFORMOTEROL TARTRATE 15 MCG: 15 SOLUTION RESPIRATORY (INHALATION) at 11:13

## 2024-03-15 RX ADMIN — BUDESONIDE 500 MCG: 0.5 INHALANT RESPIRATORY (INHALATION) at 11:15

## 2024-03-15 RX ADMIN — ROPINIROLE HYDROCHLORIDE 3 MG: 1 TABLET, FILM COATED ORAL at 09:54

## 2024-03-15 RX ADMIN — OXYCODONE 10 MG: 5 TABLET ORAL at 15:52

## 2024-03-15 RX ADMIN — METHOCARBAMOL 750 MG: 750 TABLET ORAL at 06:21

## 2024-03-15 RX ADMIN — ROPINIROLE HYDROCHLORIDE 3 MG: 1 TABLET, FILM COATED ORAL at 13:31

## 2024-03-15 RX ADMIN — PANTOPRAZOLE SODIUM 40 MG: 40 TABLET, DELAYED RELEASE ORAL at 06:21

## 2024-03-15 RX ADMIN — ACETAMINOPHEN 1000 MG: 500 TABLET ORAL at 12:01

## 2024-03-15 RX ADMIN — OXYCODONE 10 MG: 5 TABLET ORAL at 20:35

## 2024-03-15 RX ADMIN — ENOXAPARIN SODIUM 30 MG: 100 INJECTION SUBCUTANEOUS at 09:30

## 2024-03-15 RX ADMIN — ALLOPURINOL 300 MG: 300 TABLET ORAL at 09:31

## 2024-03-15 ASSESSMENT — PAIN DESCRIPTION - LOCATION
LOCATION: BACK;SHOULDER
LOCATION: SHOULDER
LOCATION: SHOULDER

## 2024-03-15 ASSESSMENT — PAIN SCALES - GENERAL
PAINLEVEL_OUTOF10: 1
PAINLEVEL_OUTOF10: 8
PAINLEVEL_OUTOF10: 7
PAINLEVEL_OUTOF10: 3
PAINLEVEL_OUTOF10: 0
PAINLEVEL_OUTOF10: 0

## 2024-03-15 ASSESSMENT — PAIN - FUNCTIONAL ASSESSMENT
PAIN_FUNCTIONAL_ASSESSMENT: ACTIVITIES ARE NOT PREVENTED
PAIN_FUNCTIONAL_ASSESSMENT: 0-10
PAIN_FUNCTIONAL_ASSESSMENT: ACTIVITIES ARE NOT PREVENTED
PAIN_FUNCTIONAL_ASSESSMENT: 0-10

## 2024-03-15 ASSESSMENT — PAIN DESCRIPTION - DESCRIPTORS
DESCRIPTORS: ACHING;DISCOMFORT
DESCRIPTORS: ACHING;DISCOMFORT;CRAMPING
DESCRIPTORS: ACHING;DISCOMFORT

## 2024-03-15 ASSESSMENT — PAIN DESCRIPTION - ORIENTATION
ORIENTATION: RIGHT;LEFT

## 2024-03-15 ASSESSMENT — PAIN DESCRIPTION - PAIN TYPE: TYPE: ACUTE PAIN;SURGICAL PAIN

## 2024-03-15 ASSESSMENT — PAIN DESCRIPTION - FREQUENCY: FREQUENCY: INTERMITTENT

## 2024-03-15 NOTE — PROGRESS NOTES
Occupational Therapy  Facility/Department: [facility] ACUTE REHAB UNIT  Rehabilitation Occupational Therapy Daily Treatment Note    Date: 3/15/24  Patient Name: Marko Bermudez       Room: 3102/3102-01  MRN: [id number]  Account: [id number]   : 1950  (73 y.o.) Gender: male                    Past Medical History:  has a past medical history of Arthritis, Asthma, Gout, Hyperlipidemia, Hypertension, Restless leg, and SOB (shortness of breath).  Past Surgical History:   has a past surgical history that includes back surgery; Neck surgery; Knee arthroscopy; hernia repair; Toe Surgery; Knee arthroscopy ([id number]); Carpal tunnel release; Rotator cuff repair; Cystoscopy; cervical fusion; Knee Arthroplasty (Left, 13); back surgery (may 2013); other surgical history (2013); shoulder surgery; Shoulder arthroscopy (Right, 3/26/14); Colonoscopy; shoulder surgery (Right, 05/10/2017); and laminectomy (N/A, 3/9/2024).    Restrictions  Other position/activity restrictions: up with assist, ambulate; full code; adult diet reg 3 carb; TLSO brace when OOB  Required Braces or Orthoses  Spinal: Thoracic Lumbar Sacral Orthotics  Required Braces or Orthoses?: Yes    Subjective  Subjective: Pt semi supine in bed upon arrival, agreeable to OT session. Reported 1/10 pain in scapular area, RN aware.                Objective     Cognition  Overall Cognitive Status: WNL  Orientation  Overall Orientation Status: Within Normal Limits  Orientation Level: Oriented X4         ADL  Upper Extremity Dressing  Assistance Level: Minimal assistance  Skilled Clinical Factors: donning TLSO and tshirt seated EOB- min A to adjust arm straps of TLSO brace  Lower Extremity Dressing  Equipment Provided: Reachers  Assistance Level: Minimal assistance  Skilled Clinical Factors: Pt threaded pants seated on toilet with use of reacher. Pulling pants up with min A for standing balance  Putting On/Taking Off Footwear  Assistance Level: Minimal

## 2024-03-15 NOTE — PLAN OF CARE
Problem: Discharge Planning  Goal: Discharge to home or other facility with appropriate resources    Discharge to home or other facility with appropriate resources:   Identify barriers to discharge with patient and caregiver   Arrange for needed discharge resources and transportation as appropriate   Identify discharge learning needs (meds, wound care, etc)     Problem: Pain  Goal: Verbalizes/displays adequate comfort level or baseline comfort level    Verbalizes/displays adequate comfort level or baseline comfort level:   Encourage patient to monitor pain and request assistance   Assess pain using appropriate pain scale   Administer analgesics based on type and severity of pain and evaluate response

## 2024-03-15 NOTE — PLAN OF CARE
Problem: Discharge Planning  Goal: Discharge to home or other facility with appropriate resources  3/15/2024 1228 by Shadia Mullins RN  Outcome: Progressing     Problem: Safety - Adult  Goal: Free from fall injury  Outcome: Progressing     Problem: ABCDS Injury Assessment  Goal: Absence of physical injury  Outcome: Progressing     Problem: Pain  Goal: Verbalizes/displays adequate comfort level or baseline comfort level  3/15/2024 1228 by Shadia Mullins RN  Outcome: Progressing

## 2024-03-15 NOTE — PROGRESS NOTES
K/uL    MPV 9.8 5.0 - 10.5 fL    Neutrophils % 78.6 %    Lymphocytes % 9.9 %    Monocytes % 10.3 %    Eosinophils % 1.2 %    Basophils % 0.0 %    Neutrophils Absolute 7.8 (H) 1.7 - 7.7 K/uL    Lymphocytes Absolute 1.0 1.0 - 5.1 K/uL    Monocytes Absolute 1.0 0.0 - 1.3 K/uL    Eosinophils Absolute 0.1 0.0 - 0.6 K/uL    Basophils Absolute 0.0 0.0 - 0.2 K/uL       Therapy progress:  PT  Required Braces or Orthoses  Spinal: Thoracic Lumbar Sacral Orthotics  Position Activity Restriction  Spinal Precautions: No Bending, No Lifting, No Twisting  Other position/activity restrictions: up with assist, ambulate; full code; adult diet reg 3 carb; TLSO brace when OOB  Objective     Sit to Stand: Minimal Assistance (at recliner/EOB/wc with RW)  Stand to Sit: Minimal Assistance  Device: Rolling Walker  Assistance: Minimal assistance (CGA-min)  Distance: 185'  OT  PT Equipment Recommendations  Equipment Needed: No  Other: owns RW, wc and upright walker  Toilet - Technique: Ambulating  Equipment Used: Standard toilet  Toilet Transfers Comments: heavy reliance of GB and sink for leverage to stand  Assessment        SLP          Body mass index is 38.05 kg/m².    Assessment and Plan:  s/p THORACIC 6 - THORACIC 12 EXTENSION OF PREVIOUS SPINAL FUSION TO THORACIC 4 WITH DECOMPRESSION, REMOVAL OF SPINAL CORD STIMULATOR (N/A)  - PT/OT  - pain control  -  wound care   - drain in place   - continues to have weakness post op due to myelopathy      HTN  - norvasc, losartan, cardura, spirolactone      HLD  - lipitor      Gout  - allopurinol      Neurpathic pain  - continue gabapentin     Rehab Progress: Improving  Anticipated Dispo: home  Services/DME: TBD  ELOS: BRIAN Phelps D.O. M.P.H  PM&R  3/15/2024  10:47 AM

## 2024-03-15 NOTE — PROGRESS NOTES
Physical Therapy  Facility/Department: [facility] ACUTE REHAB UNIT  Rehabilitation Physical Therapy Treatment Note    NAME: Marko Bermudez  : 1950 (73 y.o.)  MRN: [id number]  CODE STATUS: Full Code    Date of Service: 3/15/24       Restrictions:  Required Braces or Orthoses  Spinal: Thoracic Lumbar Sacral Orthotics  Position Activity Restriction  Spinal Precautions: No Bending, No Lifting, No Twisting  Other position/activity restrictions: up with assist, ambulate; full code; adult diet reg 3 carb; TLSO brace when OOB     SUBJECTIVE  Subjective  Subjective: Pt seated in  wc & agreeable to PT.  Pain: pt denies pain      OBJECTIVE  Cognition  Overall Cognitive Status: WNL  Orientation  Overall Orientation Status: Within Normal Limits  Orientation Level: Oriented X4    Functional Mobility  Transfers  Surface: From chair with arms;To chair with arms;Standard toilet;Wheelchair  Device: Walker ([employer]atter.iot walker, RW)  Sit to Stand  Assistance Level: Contact guard assist  Stand to Sit  Assistance Level: Contact guard assist  Skilled Clinical Factors: VC hand placement and controlled eccentric lower      Environmental Mobility  Ambulation  Surface: Level surface  Device: Rolling walker (upright walker)  Distance: upright walker: 15' + 100'; RW: 100' + 200'  Activity Comments: Ataxic gait with variable B LE placement, NBOS, forward flexed posture, decreased B step height with occasional decreased heel strike; no overt LOB with exception of min-mod A required when upright walker emerson from home falls appart due to missing screws. Assist to safely sit in wc with help of another PT to bring chair.  Assistance Level: Contact guard assist;Minimal assistance;Moderate assistance  Stairs  Stair Height: 6''  Device: One handrail  Number of Stairs: 9  Additional Factors: Verbal cues  Assistance Level: Contact guard assist  Skilled Clinical Factors: B UEs on L HR with lateral ascent/descent  Skilled Clinical Factors - Comments: cue for  upright posture, step clearance on ascent and controlled lower on decent        PT Exercises  Dynamic Standing Balance Exercises: pt completes 2 x 5 forward/backward steps B LEs with out UE support with exception of intermittant B UE support on // bars during standing rest breaks between reps. Seated rest breaks between rounds. Then completes x 6' forward amb in // bars with intermittant UL UE support on // bars and CGA- unstable however no overt LOB. VC for upright posture. and increased DHRUV. B knees gaurded however no buckling present.    2nd session:  Pt seated in w/c & agreeable to PT.  Pt denies pain.  Sit<->stand CGA from w/c, elevated mat, toilet, Sci-Fit  Pt amb 175 ft with RW & CGA/min A. TLSO & right knee brace on. Effortful gait, heavy use of UEs, flexed posture, decreased bilat step height, bilat knee instability noted (right>left).pt had mild LOB x 1 (min A to recover) d/t right knee giving way.    Sci-Fit using bilat LEs for 10 min. Level 3 for 9 min, level 4 for last minute.  W/c<->mat stand pivot without AD CGA  Supine<->sit SBA on mat via log roll (mat elevated to approximate height of pt's bed)  Sit<->stand x 10 from elevated mat  Pt propelled w/c 200 ft using bilat UE & LE SUPV/IND  W/c<->toilet stand pivot using grab bar CGA.  Pt urinated (seated) & managed pants with CGA,  W/c->recliner stand pivot with RW CGA.  Pt seated in recliner, alarm on, call light in reach after treatment.      ASSESSMENT/PROGRESS TOWARDS GOALS       Assessment  Assessment: Pt demonstrates progression in strength and balance through completion of majority of transfers and ambulaiton this date with CGA upright walker/RW. Exception to this due to upright walker from home trialed for first time since admission with screws missing and wlkaer falling apart during ambulaiton- min-mod A required to correct balance. Patient aware and verbalized understanding that upright walker is unsafe to use for remainder of admission/until

## 2024-03-15 NOTE — PROGRESS NOTES
AOX4. Transfers X1 w/ walker. TLSO brace to be worn out of bed. Pt c/o bilateral shoulder pain, relieved with repositioning, pt satisfied. Pt wears knee brace from home for comfort.

## 2024-03-16 PROCEDURE — 6370000000 HC RX 637 (ALT 250 FOR IP): Performed by: PHYSICAL MEDICINE & REHABILITATION

## 2024-03-16 PROCEDURE — 94640 AIRWAY INHALATION TREATMENT: CPT

## 2024-03-16 PROCEDURE — 94660 CPAP INITIATION&MGMT: CPT

## 2024-03-16 PROCEDURE — 97535 SELF CARE MNGMENT TRAINING: CPT

## 2024-03-16 PROCEDURE — 97530 THERAPEUTIC ACTIVITIES: CPT

## 2024-03-16 PROCEDURE — 1280000000 HC REHAB R&B

## 2024-03-16 PROCEDURE — 6360000002 HC RX W HCPCS: Performed by: PHYSICAL MEDICINE & REHABILITATION

## 2024-03-16 PROCEDURE — 97110 THERAPEUTIC EXERCISES: CPT

## 2024-03-16 PROCEDURE — 97116 GAIT TRAINING THERAPY: CPT

## 2024-03-16 RX ADMIN — ACETAMINOPHEN 1000 MG: 500 TABLET ORAL at 13:15

## 2024-03-16 RX ADMIN — METHOCARBAMOL 750 MG: 750 TABLET ORAL at 20:42

## 2024-03-16 RX ADMIN — ACETAMINOPHEN 1000 MG: 500 TABLET ORAL at 05:08

## 2024-03-16 RX ADMIN — GABAPENTIN 600 MG: 300 CAPSULE ORAL at 20:41

## 2024-03-16 RX ADMIN — LOSARTAN POTASSIUM 50 MG: 25 TABLET, FILM COATED ORAL at 09:37

## 2024-03-16 RX ADMIN — OXYCODONE 10 MG: 5 TABLET ORAL at 13:15

## 2024-03-16 RX ADMIN — ENOXAPARIN SODIUM 30 MG: 100 INJECTION SUBCUTANEOUS at 20:43

## 2024-03-16 RX ADMIN — ENOXAPARIN SODIUM 30 MG: 100 INJECTION SUBCUTANEOUS at 09:37

## 2024-03-16 RX ADMIN — ARFORMOTEROL TARTRATE 15 MCG: 15 SOLUTION RESPIRATORY (INHALATION) at 21:48

## 2024-03-16 RX ADMIN — GABAPENTIN 600 MG: 300 CAPSULE ORAL at 09:36

## 2024-03-16 RX ADMIN — ROPINIROLE HYDROCHLORIDE 3 MG: 1 TABLET, FILM COATED ORAL at 20:42

## 2024-03-16 RX ADMIN — DOXAZOSIN 4 MG: 2 TABLET ORAL at 20:42

## 2024-03-16 RX ADMIN — GABAPENTIN 600 MG: 300 CAPSULE ORAL at 13:15

## 2024-03-16 RX ADMIN — BUDESONIDE 500 MCG: 0.5 INHALANT RESPIRATORY (INHALATION) at 07:30

## 2024-03-16 RX ADMIN — BUDESONIDE 500 MCG: 0.5 INHALANT RESPIRATORY (INHALATION) at 21:48

## 2024-03-16 RX ADMIN — GABAPENTIN 600 MG: 300 CAPSULE ORAL at 17:58

## 2024-03-16 RX ADMIN — ARFORMOTEROL TARTRATE 15 MCG: 15 SOLUTION RESPIRATORY (INHALATION) at 07:30

## 2024-03-16 RX ADMIN — TRAZODONE HYDROCHLORIDE 100 MG: 50 TABLET ORAL at 20:42

## 2024-03-16 RX ADMIN — METHOCARBAMOL 750 MG: 750 TABLET ORAL at 13:15

## 2024-03-16 RX ADMIN — ROPINIROLE HYDROCHLORIDE 3 MG: 1 TABLET, FILM COATED ORAL at 09:36

## 2024-03-16 RX ADMIN — OXYCODONE 10 MG: 5 TABLET ORAL at 20:41

## 2024-03-16 RX ADMIN — ACETAMINOPHEN 1000 MG: 500 TABLET ORAL at 17:59

## 2024-03-16 RX ADMIN — ALLOPURINOL 300 MG: 300 TABLET ORAL at 09:36

## 2024-03-16 RX ADMIN — PANTOPRAZOLE SODIUM 40 MG: 40 TABLET, DELAYED RELEASE ORAL at 05:08

## 2024-03-16 RX ADMIN — CARBIDOPA AND LEVODOPA 1 TABLET: 25; 250 TABLET ORAL at 20:51

## 2024-03-16 RX ADMIN — SPIRONOLACTONE 25 MG: 25 TABLET ORAL at 09:37

## 2024-03-16 RX ADMIN — AMLODIPINE BESYLATE 5 MG: 5 TABLET ORAL at 09:37

## 2024-03-16 RX ADMIN — ROPINIROLE HYDROCHLORIDE 3 MG: 1 TABLET, FILM COATED ORAL at 13:15

## 2024-03-16 RX ADMIN — VITAMIN B COMPLEX 1 CAPSULE: at 09:37

## 2024-03-16 RX ADMIN — ATORVASTATIN CALCIUM 40 MG: 40 TABLET, FILM COATED ORAL at 09:37

## 2024-03-16 ASSESSMENT — PAIN DESCRIPTION - DESCRIPTORS
DESCRIPTORS: ACHING;DISCOMFORT

## 2024-03-16 ASSESSMENT — PAIN - FUNCTIONAL ASSESSMENT
PAIN_FUNCTIONAL_ASSESSMENT: ACTIVITIES ARE NOT PREVENTED
PAIN_FUNCTIONAL_ASSESSMENT: 0-10
PAIN_FUNCTIONAL_ASSESSMENT: ACTIVITIES ARE NOT PREVENTED
PAIN_FUNCTIONAL_ASSESSMENT: ACTIVITIES ARE NOT PREVENTED
PAIN_FUNCTIONAL_ASSESSMENT: 0-10

## 2024-03-16 ASSESSMENT — PAIN DESCRIPTION - LOCATION
LOCATION: BACK
LOCATION: BACK;SHOULDER
LOCATION: BACK

## 2024-03-16 ASSESSMENT — PAIN DESCRIPTION - ONSET: ONSET: GRADUAL

## 2024-03-16 ASSESSMENT — PAIN DESCRIPTION - ORIENTATION
ORIENTATION: RIGHT;LEFT

## 2024-03-16 ASSESSMENT — PAIN SCALES - GENERAL
PAINLEVEL_OUTOF10: 3
PAINLEVEL_OUTOF10: 7
PAINLEVEL_OUTOF10: 0
PAINLEVEL_OUTOF10: 0
PAINLEVEL_OUTOF10: 8

## 2024-03-16 ASSESSMENT — PAIN DESCRIPTION - PAIN TYPE: TYPE: SURGICAL PAIN

## 2024-03-16 ASSESSMENT — PAIN DESCRIPTION - FREQUENCY: FREQUENCY: INTERMITTENT

## 2024-03-16 NOTE — PROGRESS NOTES
Occupational Therapy  Facility/Department: [facility] ACUTE REHAB UNIT  Rehabilitation Occupational Treatment Note        Date: 3/16/24  Patient Name: Marko Bermudez       Room: 3102/3102-01  MRN: [id number]  Account: [id number]   : 1950  (73 y.o.) Gender: male       Restrictions  Other position/activity restrictions: up with assist, ambulate; full code; adult diet reg 3 carb; TLSO brace when OOB  Required Braces or Orthoses  Spinal: Thoracic Lumbar Sacral Orthotics  Required Braces or Orthoses?: Yes    Subjective  Subjective: Pt seated in w/c at OT arrival, pleasant and agreeable to tx session stating, \"I've been planning and I think I should use the bathroom first, then shower and get dressed.\"  Comments: no c/o pain during tx session this date, pt states \"what pain?\" and \"surprisingly I am doing alright on that end.\"          Vitals  Pulse: 67  Respirations: 18  BP: 133/66  Oxygen Therapy  SpO2: 96 %  Pulse Oximetry Type: Intermittent  Pulse Oximeter Device Location: Finger  O2 Device: None (Room air)  Level of Consciousness: Alert (0)    Objective     Cognition  Overall Cognitive Status: WNL  Orientation  Overall Orientation Status: Within Normal Limits  Orientation Level: Oriented X4        Functional Mobility  Balance  Sitting Balance: Supervision (seated on TTB)  Standing Balance: Stand by assistance (SBA-CGA)  Standing Balance  Time: ~20 min total  Activity: standing ADLs, functional tranfers, and mobilty  Transfers  Sit to stand: Contact guard assistance  Stand to sit: Contact guard assistance  Toilet Transfers  Toilet - Technique: Ambulating  Equipment Used: Standard toilet  Toilet Transfer: Contact guard assistance  Toilet Transfers Comments: heavy reliance of GB leverage to stand  Shower Transfers  Shower - Transfer From: Walker  Shower - Transfer Type: To and From  Shower - Transfer To: Transfer tub bench  Shower - Technique: Ambulating  Shower Transfers: Contact Guard  Shower Transfers Comments:  Schedule CBC,CMp and see me in 1 day next week, live visit  Pt will complete UB dressing i'ly  Short Term Goal 5: Pt will complete toileting and toilet transfer with mod I    Assessment  Assessment: Pt demo's improvement w/ ADLs secondary to use of AE for LB dressing and bathing. Pt required intermittent verbal cues for improved use, however, is familar w/ AE at baseline per report. Pt complete UB dressing and LB dressing w/ Min A, pt continues to demo mild difficulty w/ TLSO brace. Required encouragement to ambulate within room as pt initially wanted to use w/c for all mobility, however, easily encouraged. Pt is motivated to participate and eager to continue progressing w/ therapy. Would benefit from continued skilled OT services, cont. OT POC  Occupational Therapy Plan  Times Per Week: 90 minutes/day x 5 days/week  Current Treatment Recommendations: Balance training;Functional mobility training;Endurance training;Safety education & training;Patient/Caregiver education & training;Self-Care / ADL;Strengthening;Neuromuscular re-education;Equipment evaluation, education, & procurement;Home management training       Therapy Time   Individual Concurrent Group Co-treatment   Time In  1000         Time Out  1130         Minutes  90                 Armin Cardenas, OT

## 2024-03-16 NOTE — PROGRESS NOTES
Department of Physical Medicine & Rehabilitation  Progress Note    Patient Identification:  Marko Bermudez  [id number]  : 1950  Admit date: 3/13/2024    Chief Complaint: Thoracic myelopathy    Subjective:   Seen in his room this morning and with therapy. He is working on his wheelchair laps around the unit. No new pain overnight. He slept well last night and is happy to be progressing. He is hoping for a Tuesday DC but understands he might need some more time in the ARU.    ROS: No f/c, n/v, cp     Objective:  Patient Vitals for the past 24 hrs:   BP Temp Temp src Pulse Resp SpO2   24 0930 133/66 -- -- 67 18 96 %   24 0538 -- -- -- 81 -- --   24 0508 -- -- -- 83 -- --   03/15/24 2104 -- -- -- 80 16 --   03/15/24 2102 -- -- -- -- -- 96 %   03/15/24 2036 118/67 98 °F (36.7 °C) Oral 82 16 96 %   03/15/24 2034 -- -- -- 85 16 --   03/15/24 1333 114/62 -- -- -- -- --       Const: Alert. No distress, pleasant.   HEENT: Normocephalic, atraumatic. Normal sclera/conjunctiva. MMM.   CV: Regular rate and rhythm.   Resp: No respiratory distress. Lungs CTAB.   Abd: Soft, nontender, nondistended, NABS+   Ext: No edema.   Neuro: Alert, oriented, appropriately interactive.   Psych: Cooperative, appropriate mood and affect    Laboratory data: Available via EMR.   Last 24 hour lab  No results found for this or any previous visit (from the past 24 hour(s)).      Therapy progress:  PT  Required Braces or Orthoses  Spinal: Thoracic Lumbar Sacral Orthotics  Position Activity Restriction  Spinal Precautions: No Bending, No Lifting, No Twisting  Other position/activity restrictions: up with assist, ambulate; full code; adult diet reg 3 carb; TLSO brace when OOB  Objective     Sit to Stand: Minimal Assistance (at recliner/EOB/wc with RW)  Stand to Sit: Minimal Assistance  Device: Rolling Walker  Assistance: Minimal assistance (CGA-min)  Distance: 185'  OT  PT Equipment Recommendations  Equipment Needed: No  Other:  owns RW and wc  Toilet - Technique: Ambulating  Equipment Used: Standard toilet  Toilet Transfers Comments: heavy reliance of GB and sink for leverage to stand  Assessment        SLP          Body mass index is 38.05 kg/m².    Assessment and Plan:  s/p THORACIC 6 - THORACIC 12 EXTENSION OF PREVIOUS SPINAL FUSION TO THORACIC 4 WITH DECOMPRESSION, REMOVAL OF SPINAL CORD STIMULATOR (N/A)  - PT/OT  - pain control  -  wound care   - drain in place   - continues to have weakness post op due to myelopathy      HTN  - norvasc, losartan, cardura, spirolactone      HLD  - lipitor      Gout  - allopurinol      Neurpathic pain  - continue gabapentin     Rehab Progress: Improving  Anticipated Dispo: home  Services/DME: TBD  ELOS: Tuesday goal- might need a few extra days      Mac Phelps D.O. M.P.H  PM&R  3/16/2024  11:37 AM

## 2024-03-16 NOTE — PROGRESS NOTES
Shift assessment completed. VSS. Patient A/O x4, Pain to surgical incision on back is being managed with PRN and scheduled medications. Call light and over bed table within reach, hourly rounding and visual checks in place, safety measures in place, will continue to monitor.

## 2024-03-16 NOTE — PROGRESS NOTES
Physical Therapy  Facility/Department: [facility] ACUTE REHAB UNIT  Rehabilitation Physical Therapy Treatment Note    NAME: Marko Bermudez  : 1950 (73 y.o.)  MRN: [id number]  CODE STATUS: Full Code    Date of Service: 3/16/24       Restrictions:  Required Braces or Orthoses  Spinal: Thoracic Lumbar Sacral Orthotics  Position Activity Restriction  Spinal Precautions: No Bending, No Lifting, No Twisting  Other position/activity restrictions: up with assist, ambulate; full code; adult diet reg 3 carb; TLSO brace when OOB     SUBJECTIVE  Subjective  Subjective: Pt seated in  wc & agreeable to PT.  Pain: pt denies pain          OBJECTIVE  Cognition  Overall Cognitive Status: WNL  Orientation  Overall Orientation Status: Within Normal Limits  Orientation Level: Oriented X4    Functional Mobility  Standing Balance  Comments: pt completed wc<>toilet transfer with CGA, pt managed pants up/down, continent of bladder.  Transfers  Surface: From chair with arms;To chair with arms;Standard toilet;Wheelchair  Device: Walker  Sit to Stand  Assistance Level: Contact guard assist  Stand to Sit  Assistance Level: Contact guard assist  Skilled Clinical Factors: VC hand placement and controlled eccentric lower      Environmental Mobility  Ambulation  Surface: Level surface  Device: Rolling walker (upright walker)  Distance: 150ft  Activity Comments: Ataxic gait with variable B LE placement, NBOS, forward flexed posture, decreased B step height with occasional decreased heel strike; no overt LOB with CGA provided for safety  Assistance Level: Contact guard assist  Stairs  Stair Height: 6''  Device: One handrail  Number of Stairs: 6+6  Additional Factors: Verbal cues  Assistance Level: Contact guard assist  Skilled Clinical Factors: B UEs on L HR with lateral ascent/descent  Skilled Clinical Factors - Comments: cue for upright posture, proximity to rail, step clearance on ascent and controlled lower on decent  Wheelchair  Surface: Level

## 2024-03-16 NOTE — PLAN OF CARE
Problem: Discharge Planning  Goal: Discharge to home or other facility with appropriate resources  Outcome: Progressing  Discharge to home or other facility with appropriate resources: Identify barriers to discharge with patient and caregiver     Problem: Safety - Adult  Goal: Free from fall injury  Outcome: Progressing     Problem: ABCDS Injury Assessment  Goal: Absence of physical injury  Outcome: Progressing     Problem: Pain  Goal: Verbalizes/displays adequate comfort level or baseline comfort level  Outcome: Progressing

## 2024-03-16 NOTE — PLAN OF CARE
Problem: Discharge Planning  Goal: Discharge to home or other facility with appropriate resources  Outcome: Progressing  Flowsheets (Taken 3/16/2024 1412)  Discharge to home or other facility with appropriate resources: Identify barriers to discharge with patient and caregiver     Problem: Safety - Adult  Goal: Free from fall injury  Outcome: Progressing  Flowsheets (Taken 3/16/2024 1412)  Free From Fall Injury: Instruct family/caregiver on patient safety     Problem: Pain  Goal: Verbalizes/displays adequate comfort level or baseline comfort level  Outcome: Progressing  Flowsheets (Taken 3/16/2024 1412)  Verbalizes/displays adequate comfort level or baseline comfort level:   Encourage patient to monitor pain and request assistance   Assess pain using appropriate pain scale   Administer analgesics based on type and severity of pain and evaluate response   Implement non-pharmacological measures as appropriate and evaluate response

## 2024-03-17 VITALS
DIASTOLIC BLOOD PRESSURE: 63 MMHG | RESPIRATION RATE: 16 BRPM | TEMPERATURE: 98 F | SYSTOLIC BLOOD PRESSURE: 132 MMHG | HEART RATE: 82 BPM | BODY MASS INDEX: 38.01 KG/M2 | WEIGHT: 280.65 LBS | HEIGHT: 72 IN | OXYGEN SATURATION: 96 %

## 2024-03-17 LAB
GLUCOSE BLD-MCNC: 120 MG/DL (ref 70–99)
GLUCOSE BLD-MCNC: 126 MG/DL (ref 70–99)
PERFORMED ON: ABNORMAL
PERFORMED ON: ABNORMAL

## 2024-03-17 PROCEDURE — 94660 CPAP INITIATION&MGMT: CPT

## 2024-03-17 PROCEDURE — 6360000002 HC RX W HCPCS: Performed by: PHYSICAL MEDICINE & REHABILITATION

## 2024-03-17 PROCEDURE — 6370000000 HC RX 637 (ALT 250 FOR IP): Performed by: PHYSICAL MEDICINE & REHABILITATION

## 2024-03-17 PROCEDURE — 1280000000 HC REHAB R&B

## 2024-03-17 PROCEDURE — 94640 AIRWAY INHALATION TREATMENT: CPT

## 2024-03-17 RX ADMIN — GABAPENTIN 600 MG: 300 CAPSULE ORAL at 17:49

## 2024-03-17 RX ADMIN — ALLOPURINOL 300 MG: 300 TABLET ORAL at 08:39

## 2024-03-17 RX ADMIN — ROPINIROLE HYDROCHLORIDE 3 MG: 1 TABLET, FILM COATED ORAL at 13:48

## 2024-03-17 RX ADMIN — ACETAMINOPHEN 1000 MG: 500 TABLET ORAL at 17:49

## 2024-03-17 RX ADMIN — LOSARTAN POTASSIUM 50 MG: 25 TABLET, FILM COATED ORAL at 08:39

## 2024-03-17 RX ADMIN — VITAMIN B COMPLEX 1 CAPSULE: at 08:39

## 2024-03-17 RX ADMIN — ROPINIROLE HYDROCHLORIDE 3 MG: 1 TABLET, FILM COATED ORAL at 08:39

## 2024-03-17 RX ADMIN — ENOXAPARIN SODIUM 30 MG: 100 INJECTION SUBCUTANEOUS at 08:39

## 2024-03-17 RX ADMIN — CARBIDOPA AND LEVODOPA 1 TABLET: 25; 250 TABLET ORAL at 20:42

## 2024-03-17 RX ADMIN — BUDESONIDE 500 MCG: 0.5 INHALANT RESPIRATORY (INHALATION) at 07:46

## 2024-03-17 RX ADMIN — ARFORMOTEROL TARTRATE 15 MCG: 15 SOLUTION RESPIRATORY (INHALATION) at 21:53

## 2024-03-17 RX ADMIN — GABAPENTIN 600 MG: 300 CAPSULE ORAL at 20:34

## 2024-03-17 RX ADMIN — GABAPENTIN 600 MG: 300 CAPSULE ORAL at 08:39

## 2024-03-17 RX ADMIN — BUDESONIDE 500 MCG: 0.5 INHALANT RESPIRATORY (INHALATION) at 21:53

## 2024-03-17 RX ADMIN — ATORVASTATIN CALCIUM 40 MG: 40 TABLET, FILM COATED ORAL at 08:39

## 2024-03-17 RX ADMIN — ENOXAPARIN SODIUM 30 MG: 100 INJECTION SUBCUTANEOUS at 20:35

## 2024-03-17 RX ADMIN — ARFORMOTEROL TARTRATE 15 MCG: 15 SOLUTION RESPIRATORY (INHALATION) at 07:46

## 2024-03-17 RX ADMIN — DOXAZOSIN 4 MG: 2 TABLET ORAL at 20:34

## 2024-03-17 RX ADMIN — GABAPENTIN 600 MG: 300 CAPSULE ORAL at 13:48

## 2024-03-17 RX ADMIN — ACETAMINOPHEN 1000 MG: 500 TABLET ORAL at 13:48

## 2024-03-17 RX ADMIN — AMLODIPINE BESYLATE 5 MG: 5 TABLET ORAL at 08:39

## 2024-03-17 RX ADMIN — TRAZODONE HYDROCHLORIDE 100 MG: 50 TABLET ORAL at 20:34

## 2024-03-17 RX ADMIN — SPIRONOLACTONE 25 MG: 25 TABLET ORAL at 08:39

## 2024-03-17 RX ADMIN — ROPINIROLE HYDROCHLORIDE 3 MG: 1 TABLET, FILM COATED ORAL at 20:35

## 2024-03-17 RX ADMIN — METHOCARBAMOL 750 MG: 750 TABLET ORAL at 20:34

## 2024-03-17 RX ADMIN — PANTOPRAZOLE SODIUM 40 MG: 40 TABLET, DELAYED RELEASE ORAL at 05:26

## 2024-03-17 ASSESSMENT — PAIN SCALES - GENERAL
PAINLEVEL_OUTOF10: 0
PAINLEVEL_OUTOF10: 6

## 2024-03-17 ASSESSMENT — PAIN - FUNCTIONAL ASSESSMENT
PAIN_FUNCTIONAL_ASSESSMENT: ACTIVITIES ARE NOT PREVENTED
PAIN_FUNCTIONAL_ASSESSMENT: 0-10
PAIN_FUNCTIONAL_ASSESSMENT: 0-10

## 2024-03-17 ASSESSMENT — PAIN DESCRIPTION - ORIENTATION: ORIENTATION: RIGHT;LEFT

## 2024-03-17 ASSESSMENT — PAIN DESCRIPTION - LOCATION: LOCATION: BACK

## 2024-03-17 ASSESSMENT — PAIN DESCRIPTION - DESCRIPTORS: DESCRIPTORS: ACHING;DISCOMFORT

## 2024-03-17 NOTE — PROGRESS NOTES
Department of Physical Medicine & Rehabilitation  Progress Note    Patient Identification:  Marko Bermudez  [id number]  : 1950  Admit date: 3/13/2024    Chief Complaint: Thoracic myelopathy    Subjective:   Resting in his chair this morning on exam. No new complaints overnight. He is asking for his diet to be upgraded. Will monitor BG for the next day.   He slept well last night and his bowels are moving well.   Will continue therapy tomorrow and would like to go home sometime this week.     ROS: No f/c, n/v, cp     Objective:  Patient Vitals for the past 24 hrs:   BP Temp Temp src Pulse Resp SpO2   24 0800 132/84 98 °F (36.7 °C) Oral 84 18 95 %   24 2053 121/63 97.3 °F (36.3 °C) Oral 78 16 96 %       Const: Alert. No distress, pleasant.   HEENT: Normocephalic, atraumatic. Normal sclera/conjunctiva. MMM.   CV: Regular rate and rhythm.   Resp: No respiratory distress. Lungs CTAB.   Abd: Soft, nontender, nondistended, NABS+   Ext: No edema.   Neuro: Alert, oriented, appropriately interactive.   Psych: Cooperative, appropriate mood and affect    Laboratory data: Available via EMR.   Last 24 hour lab  No results found for this or any previous visit (from the past 24 hour(s)).      Therapy progress:  PT  Required Braces or Orthoses  Spinal: Thoracic Lumbar Sacral Orthotics  Position Activity Restriction  Spinal Precautions: No Bending, No Lifting, No Twisting  Other position/activity restrictions: up with assist, ambulate; full code; adult diet reg 3 carb; TLSO brace when OOB  Objective     Sit to Stand: Minimal Assistance (at recliner/EOB/wc with RW)  Stand to Sit: Minimal Assistance  Device: Rolling Walker  Assistance: Minimal assistance (CGA-min)  Distance: 185'  OT  PT Equipment Recommendations  Equipment Needed: No  Other: owns RW and wc  Toilet - Technique: Ambulating  Equipment Used: Standard toilet  Toilet Transfers Comments: heavy reliance of GB leverage to stand  Assessment        SLP

## 2024-03-17 NOTE — PLAN OF CARE
Problem: Discharge Planning  Goal: Discharge to home or other facility with appropriate resources  Outcome: Progressing  Discharge to home or other facility with appropriate resources: Identify barriers to discharge with patient and caregiver     Problem: Safety - Adult  Goal: Free from fall injury  Outcome: Progressing  Free From Fall Injury: Instruct family/caregiver on patient safety     Problem: ABCDS Injury Assessment  Goal: Absence of physical injury  Outcome: Progressing     Problem: Pain  Goal: Verbalizes/displays adequate comfort level or baseline comfort level  Outcome: Progressing  Verbalizes/displays adequate comfort level or baseline comfort level:   Encourage patient to monitor pain and request assistance   Assess pain using appropriate pain scale   Administer analgesics based on type and severity of pain and evaluate response   Implement non-pharmacological measures as appropriate and evaluate response

## 2024-03-17 NOTE — PLAN OF CARE
Problem: Discharge Planning  Goal: Discharge to home or other facility with appropriate resources  Outcome: Progressing  Flowsheets (Taken 3/17/2024 1658)  Discharge to home or other facility with appropriate resources: Identify barriers to discharge with patient and caregiver     Problem: Safety - Adult  Goal: Free from fall injury  Outcome: Progressing  Flowsheets (Taken 3/17/2024 1658)  Free From Fall Injury: Instruct family/caregiver on patient safety     Problem: Pain  Goal: Verbalizes/displays adequate comfort level or baseline comfort level  Outcome: Progressing  Flowsheets (Taken 3/17/2024 1658)  Verbalizes/displays adequate comfort level or baseline comfort level:   Encourage patient to monitor pain and request assistance   Assess pain using appropriate pain scale   Administer analgesics based on type and severity of pain and evaluate response   Implement non-pharmacological measures as appropriate and evaluate response

## 2024-03-18 LAB
ANION GAP SERPL CALCULATED.3IONS-SCNC: 9 MMOL/L (ref 3–16)
BASOPHILS # BLD: 0 K/UL (ref 0–0.2)
BASOPHILS NFR BLD: 0.1 %
BUN SERPL-MCNC: 26 MG/DL (ref 7–20)
CALCIUM SERPL-MCNC: 9.2 MG/DL (ref 8.3–10.6)
CHLORIDE SERPL-SCNC: 102 MMOL/L (ref 99–110)
CO2 SERPL-SCNC: 24 MMOL/L (ref 21–32)
CREAT SERPL-MCNC: 1.2 MG/DL (ref 0.8–1.3)
DEPRECATED RDW RBC AUTO: 13.2 % (ref 12.4–15.4)
EOSINOPHIL # BLD: 0.1 K/UL (ref 0–0.6)
EOSINOPHIL NFR BLD: 1.4 %
GFR SERPLBLD CREATININE-BSD FMLA CKD-EPI: >60 ML/MIN/{1.73_M2}
GLUCOSE BLD-MCNC: 105 MG/DL (ref 70–99)
GLUCOSE BLD-MCNC: 114 MG/DL (ref 70–99)
GLUCOSE BLD-MCNC: 129 MG/DL (ref 70–99)
GLUCOSE SERPL-MCNC: 138 MG/DL (ref 70–99)
HCT VFR BLD AUTO: 39.2 % (ref 40.5–52.5)
HGB BLD-MCNC: 13.2 G/DL (ref 13.5–17.5)
LYMPHOCYTES # BLD: 0.7 K/UL (ref 1–5.1)
LYMPHOCYTES NFR BLD: 9.1 %
MCH RBC QN AUTO: 34.6 PG (ref 26–34)
MCHC RBC AUTO-ENTMCNC: 33.8 G/DL (ref 31–36)
MCV RBC AUTO: 102.4 FL (ref 80–100)
MONOCYTES # BLD: 0.4 K/UL (ref 0–1.3)
MONOCYTES NFR BLD: 4.3 %
NEUTROPHILS # BLD: 6.9 K/UL (ref 1.7–7.7)
NEUTROPHILS NFR BLD: 85.1 %
PERFORMED ON: ABNORMAL
PLATELET # BLD AUTO: 178 K/UL (ref 135–450)
PMV BLD AUTO: 9.4 FL (ref 5–10.5)
POTASSIUM SERPL-SCNC: 4.3 MMOL/L (ref 3.5–5.1)
RBC # BLD AUTO: 3.83 M/UL (ref 4.2–5.9)
SODIUM SERPL-SCNC: 135 MMOL/L (ref 136–145)
WBC # BLD AUTO: 8.1 K/UL (ref 4–11)

## 2024-03-18 PROCEDURE — 94640 AIRWAY INHALATION TREATMENT: CPT

## 2024-03-18 PROCEDURE — 36415 COLL VENOUS BLD VENIPUNCTURE: CPT

## 2024-03-18 PROCEDURE — 97535 SELF CARE MNGMENT TRAINING: CPT

## 2024-03-18 PROCEDURE — 6360000002 HC RX W HCPCS: Performed by: PHYSICAL MEDICINE & REHABILITATION

## 2024-03-18 PROCEDURE — 97530 THERAPEUTIC ACTIVITIES: CPT

## 2024-03-18 PROCEDURE — 97116 GAIT TRAINING THERAPY: CPT

## 2024-03-18 PROCEDURE — 85025 COMPLETE CBC W/AUTO DIFF WBC: CPT

## 2024-03-18 PROCEDURE — 80048 BASIC METABOLIC PNL TOTAL CA: CPT

## 2024-03-18 PROCEDURE — 1280000000 HC REHAB R&B

## 2024-03-18 PROCEDURE — 6370000000 HC RX 637 (ALT 250 FOR IP): Performed by: PHYSICAL MEDICINE & REHABILITATION

## 2024-03-18 RX ADMIN — BUDESONIDE 500 MCG: 0.5 INHALANT RESPIRATORY (INHALATION) at 20:20

## 2024-03-18 RX ADMIN — ATORVASTATIN CALCIUM 40 MG: 40 TABLET, FILM COATED ORAL at 09:06

## 2024-03-18 RX ADMIN — GABAPENTIN 600 MG: 300 CAPSULE ORAL at 13:58

## 2024-03-18 RX ADMIN — GABAPENTIN 600 MG: 300 CAPSULE ORAL at 21:37

## 2024-03-18 RX ADMIN — DOXAZOSIN 4 MG: 2 TABLET ORAL at 21:38

## 2024-03-18 RX ADMIN — ENOXAPARIN SODIUM 30 MG: 100 INJECTION SUBCUTANEOUS at 09:35

## 2024-03-18 RX ADMIN — ROPINIROLE HYDROCHLORIDE 3 MG: 1 TABLET, FILM COATED ORAL at 09:35

## 2024-03-18 RX ADMIN — ACETAMINOPHEN 1000 MG: 500 TABLET ORAL at 17:25

## 2024-03-18 RX ADMIN — ACETAMINOPHEN 1000 MG: 500 TABLET ORAL at 05:32

## 2024-03-18 RX ADMIN — CARBIDOPA AND LEVODOPA 1 TABLET: 25; 250 TABLET ORAL at 21:52

## 2024-03-18 RX ADMIN — GABAPENTIN 600 MG: 300 CAPSULE ORAL at 09:36

## 2024-03-18 RX ADMIN — LOSARTAN POTASSIUM 50 MG: 25 TABLET, FILM COATED ORAL at 09:36

## 2024-03-18 RX ADMIN — GABAPENTIN 600 MG: 300 CAPSULE ORAL at 17:25

## 2024-03-18 RX ADMIN — PANTOPRAZOLE SODIUM 40 MG: 40 TABLET, DELAYED RELEASE ORAL at 05:31

## 2024-03-18 RX ADMIN — ACETAMINOPHEN 1000 MG: 500 TABLET ORAL at 12:13

## 2024-03-18 RX ADMIN — TRAZODONE HYDROCHLORIDE 100 MG: 50 TABLET ORAL at 21:38

## 2024-03-18 RX ADMIN — AMLODIPINE BESYLATE 5 MG: 5 TABLET ORAL at 09:06

## 2024-03-18 RX ADMIN — ARFORMOTEROL TARTRATE 15 MCG: 15 SOLUTION RESPIRATORY (INHALATION) at 20:20

## 2024-03-18 RX ADMIN — ALLOPURINOL 300 MG: 300 TABLET ORAL at 09:06

## 2024-03-18 RX ADMIN — VITAMIN B COMPLEX 1 CAPSULE: at 09:06

## 2024-03-18 RX ADMIN — ROPINIROLE HYDROCHLORIDE 3 MG: 1 TABLET, FILM COATED ORAL at 21:38

## 2024-03-18 RX ADMIN — SPIRONOLACTONE 25 MG: 25 TABLET ORAL at 09:36

## 2024-03-18 RX ADMIN — ROPINIROLE HYDROCHLORIDE 3 MG: 1 TABLET, FILM COATED ORAL at 13:58

## 2024-03-18 RX ADMIN — ENOXAPARIN SODIUM 30 MG: 100 INJECTION SUBCUTANEOUS at 21:37

## 2024-03-18 ASSESSMENT — PAIN SCALES - GENERAL
PAINLEVEL_OUTOF10: 0
PAINLEVEL_OUTOF10: 0
PAINLEVEL_OUTOF10: 3
PAINLEVEL_OUTOF10: 0

## 2024-03-18 ASSESSMENT — PAIN DESCRIPTION - LOCATION: LOCATION: BACK

## 2024-03-18 ASSESSMENT — PAIN - FUNCTIONAL ASSESSMENT
PAIN_FUNCTIONAL_ASSESSMENT: 0-10
PAIN_FUNCTIONAL_ASSESSMENT: 0-10
PAIN_FUNCTIONAL_ASSESSMENT: ACTIVITIES ARE NOT PREVENTED

## 2024-03-18 ASSESSMENT — PAIN DESCRIPTION - ORIENTATION: ORIENTATION: RIGHT;LEFT

## 2024-03-18 ASSESSMENT — PAIN DESCRIPTION - DESCRIPTORS: DESCRIPTORS: ACHING;DISCOMFORT

## 2024-03-18 NOTE — PROGRESS NOTES
5.1 mmol/L    Chloride 102 99 - 110 mmol/L    CO2 24 21 - 32 mmol/L    Anion Gap 9 3 - 16    Glucose 138 (H) 70 - 99 mg/dL    BUN 26 (H) 7 - 20 mg/dL    Creatinine 1.2 0.8 - 1.3 mg/dL    Est, Glom Filt Rate >60 >60    Calcium 9.2 8.3 - 10.6 mg/dL   CBC auto differential    Collection Time: 03/18/24  8:57 AM   Result Value Ref Range    WBC 8.1 4.0 - 11.0 K/uL    RBC 3.83 (L) 4.20 - 5.90 M/uL    Hemoglobin 13.2 (L) 13.5 - 17.5 g/dL    Hematocrit 39.2 (L) 40.5 - 52.5 %    .4 (H) 80.0 - 100.0 fL    MCH 34.6 (H) 26.0 - 34.0 pg    MCHC 33.8 31.0 - 36.0 g/dL    RDW 13.2 12.4 - 15.4 %    Platelets 178 135 - 450 K/uL    MPV 9.4 5.0 - 10.5 fL    Neutrophils % 85.1 %    Lymphocytes % 9.1 %    Monocytes % 4.3 %    Eosinophils % 1.4 %    Basophils % 0.1 %    Neutrophils Absolute 6.9 1.7 - 7.7 K/uL    Lymphocytes Absolute 0.7 (L) 1.0 - 5.1 K/uL    Monocytes Absolute 0.4 0.0 - 1.3 K/uL    Eosinophils Absolute 0.1 0.0 - 0.6 K/uL    Basophils Absolute 0.0 0.0 - 0.2 K/uL         Therapy progress:  PT  Required Braces or Orthoses  Spinal: Thoracic Lumbar Sacral Orthotics  Position Activity Restriction  Spinal Precautions: No Bending, No Lifting, No Twisting  Other position/activity restrictions: up with assist, ambulate; full code; adult diet reg 3 carb; TLSO brace when OOB  Objective     Sit to Stand: Minimal Assistance (at recliner/EOB/wc with RW)  Stand to Sit: Minimal Assistance  Device: Rolling Walker  Assistance: Minimal assistance (CGA-min)  Distance: 185'  OT  PT Equipment Recommendations  Equipment Needed: No  Other: owns RW and wc  Toilet - Technique: Ambulating  Equipment Used: Standard toilet  Toilet Transfers Comments: heavy reliance of GB leverage to stand  Assessment        SLP          Body mass index is 36.89 kg/m².    Assessment and Plan:  s/p THORACIC 6 - THORACIC 12 EXTENSION OF PREVIOUS SPINAL FUSION TO THORACIC 4 WITH DECOMPRESSION, REMOVAL OF SPINAL CORD STIMULATOR (N/A)  - PT/OT  - pain control  -  wound

## 2024-03-18 NOTE — PROGRESS NOTES
room with SBA. Chair alarm, call light and bedside table within reach. Pt. Reported NO more needs at EOS.     Pt will continue to benefit from skilled IP OT services to maximize INDependence with ADLs/IADLs, functional transfers/functional mobility. Continue OT POC.     Patient Education  Education  Education Given To: Patient  Education Method: Verbal  Barriers to Learning: None  Education Outcome: Verbalized understanding    Plan  Occupational Therapy Plan  Times Per Week: 90 minutes/day x 5 days/week  Current Treatment Recommendations: Balance training;Functional mobility training;Endurance training;Safety education & training;Patient/Caregiver education & training;Self-Care / ADL;Strengthening;Neuromuscular re-education;Equipment evaluation, education, & procurement;Home management training    Goals  Patient Goals   Patient goals : \"to walk out of here\"  Short Term Goals  Time Frame for Short Term Goals: 1 week- all goals ongoing  Short Term Goal 1: Pt will complete LB dressing using AE with mod I - progressing  Short Term Goal 2: Pt will complete bathing with mod I - progressing  Short Term Goal 3: Pt will complete IADL task of choice with mod I -progressing  Short Term Goal 4: Pt will complete UB dressing i'ly- progressing  Short Term Goal 5: Pt will complete toileting and toilet transfer with mod I -progressing      Therapy Time   Individual Concurrent Group Co-treatment   Time In 1000         Time Out 1100         Minutes 60          Timed Code Treatment Minutes:  60 minutes     Total Treatment Minutes: 60 minutes     Second Session Therapy Time: 1315 -1345   Individual Concurrent Group Co-treatment   Time In 1315         Time Out 1345         Minutes 30           Timed Code Treatment Minutes:  60 minutes + 30 minutes     Total Treatment Minutes: 90 minutes     MAHESH Sams/COURTNEY

## 2024-03-18 NOTE — PROGRESS NOTES
Aox4, tolerating therapy well. Vitals WNL. Transfers X1 w/ walker. Denies back/shoulder blade pain at this time. Scheduled tylenol given.       Vitals:    03/18/24 0904   BP: (!) 130/58   Pulse: 88   Resp: 16   Temp: 97.5 °F (36.4 °C)   SpO2: 97%

## 2024-03-18 NOTE — PROGRESS NOTES
Physical Therapy  Facility/Department: [facility] ACUTE REHAB UNIT  Rehabilitation Physical Therapy Treatment Note    NAME: Marko Bermudez  : 1950 (73 y.o.)  MRN: [id number]  CODE STATUS: Full Code    Date of Service: 3/18/24       Restrictions:  Required Braces or Orthoses  Spinal: Thoracic Lumbar Sacral Orthotics  Position Activity Restriction  Spinal Precautions: No Bending, No Lifting, No Twisting  Other position/activity restrictions: up with assist, ambulate; full code; adult diet reg 3 carb; TLSO brace when OOB     SUBJECTIVE  Subjective  Subjective: Pt seated in  recliner & agreeable to PT.  Pain: denies pain      OBJECTIVE  Cognition  Overall Cognitive Status: WNL  Orientation  Overall Orientation Status: Within Normal Limits  Orientation Level: Oriented X4    Functional Mobility  Transfers  Surface: From chair with arms;To chair with arms;Standard toilet;Wheelchair  Device: Walker (upright walker)  Sit to Stand  Assistance Level: Contact guard assist  Stand to Sit  Assistance Level: Contact guard assist      Environmental Mobility  Ambulation  Surface: Level surface  Device: Rolling walker (upright walker)  Distance: 350' (including ascent/descent of 70' ramp) + 200' + small distances in gym  Activity Comments: Ataxic gait with variable B LE placement, NBOS, forward flexed posture, decreased B step height with occasional decreased heel strike; CGA with exception of 1 instance of L LOB with min A required to correct. Several instances of minr R knee buckling on descent of ramp however corrects with CGA.  Assistance Level: Contact guard assist;Minimal assistance  Skilled Clinical Factors: VC for upright posture with hip extension and decreased weigth through UEs on upright walker. VC for controlled pace mohamud with descent of ramp.        PT Exercises  Dynamic Standing Balance Exercises: Pt completes 8 + 10 + 12 forward taps and 3x 15 B LE taps onto 6\" steps with B UE>UL UE support and CGA throughout. Seated

## 2024-03-19 LAB
GLUCOSE BLD-MCNC: 112 MG/DL (ref 70–99)
GLUCOSE BLD-MCNC: 126 MG/DL (ref 70–99)
PERFORMED ON: ABNORMAL
PERFORMED ON: ABNORMAL

## 2024-03-19 PROCEDURE — 97535 SELF CARE MNGMENT TRAINING: CPT

## 2024-03-19 PROCEDURE — 97110 THERAPEUTIC EXERCISES: CPT

## 2024-03-19 PROCEDURE — 6360000002 HC RX W HCPCS: Performed by: PHYSICAL MEDICINE & REHABILITATION

## 2024-03-19 PROCEDURE — 94660 CPAP INITIATION&MGMT: CPT

## 2024-03-19 PROCEDURE — 97530 THERAPEUTIC ACTIVITIES: CPT

## 2024-03-19 PROCEDURE — 6370000000 HC RX 637 (ALT 250 FOR IP): Performed by: PHYSICAL MEDICINE & REHABILITATION

## 2024-03-19 PROCEDURE — 97116 GAIT TRAINING THERAPY: CPT

## 2024-03-19 PROCEDURE — 1280000000 HC REHAB R&B

## 2024-03-19 PROCEDURE — 94640 AIRWAY INHALATION TREATMENT: CPT

## 2024-03-19 RX ADMIN — ROPINIROLE HYDROCHLORIDE 3 MG: 1 TABLET, FILM COATED ORAL at 20:12

## 2024-03-19 RX ADMIN — ACETAMINOPHEN 1000 MG: 500 TABLET ORAL at 17:24

## 2024-03-19 RX ADMIN — LOSARTAN POTASSIUM 50 MG: 25 TABLET, FILM COATED ORAL at 08:29

## 2024-03-19 RX ADMIN — TRAZODONE HYDROCHLORIDE 100 MG: 50 TABLET ORAL at 20:12

## 2024-03-19 RX ADMIN — GABAPENTIN 600 MG: 300 CAPSULE ORAL at 12:43

## 2024-03-19 RX ADMIN — ROPINIROLE HYDROCHLORIDE 3 MG: 1 TABLET, FILM COATED ORAL at 14:15

## 2024-03-19 RX ADMIN — ENOXAPARIN SODIUM 30 MG: 100 INJECTION SUBCUTANEOUS at 20:12

## 2024-03-19 RX ADMIN — ATORVASTATIN CALCIUM 40 MG: 40 TABLET, FILM COATED ORAL at 08:29

## 2024-03-19 RX ADMIN — ALLOPURINOL 300 MG: 300 TABLET ORAL at 08:30

## 2024-03-19 RX ADMIN — PANTOPRAZOLE SODIUM 40 MG: 40 TABLET, DELAYED RELEASE ORAL at 06:42

## 2024-03-19 RX ADMIN — METHOCARBAMOL 750 MG: 750 TABLET ORAL at 20:15

## 2024-03-19 RX ADMIN — ENOXAPARIN SODIUM 30 MG: 100 INJECTION SUBCUTANEOUS at 08:30

## 2024-03-19 RX ADMIN — DOXAZOSIN 4 MG: 2 TABLET ORAL at 21:00

## 2024-03-19 RX ADMIN — BUDESONIDE 500 MCG: 0.5 INHALANT RESPIRATORY (INHALATION) at 21:03

## 2024-03-19 RX ADMIN — METHOCARBAMOL 750 MG: 750 TABLET ORAL at 04:01

## 2024-03-19 RX ADMIN — GABAPENTIN 600 MG: 300 CAPSULE ORAL at 17:24

## 2024-03-19 RX ADMIN — ACETAMINOPHEN 1000 MG: 500 TABLET ORAL at 12:43

## 2024-03-19 RX ADMIN — ROPINIROLE HYDROCHLORIDE 3 MG: 1 TABLET, FILM COATED ORAL at 08:29

## 2024-03-19 RX ADMIN — SPIRONOLACTONE 25 MG: 25 TABLET ORAL at 08:33

## 2024-03-19 RX ADMIN — CARBIDOPA AND LEVODOPA 1 TABLET: 25; 250 TABLET ORAL at 20:14

## 2024-03-19 RX ADMIN — ARFORMOTEROL TARTRATE 15 MCG: 15 SOLUTION RESPIRATORY (INHALATION) at 21:03

## 2024-03-19 RX ADMIN — VITAMIN B COMPLEX 1 CAPSULE: at 08:29

## 2024-03-19 RX ADMIN — AMLODIPINE BESYLATE 5 MG: 5 TABLET ORAL at 08:29

## 2024-03-19 RX ADMIN — ACETAMINOPHEN 1000 MG: 500 TABLET ORAL at 04:01

## 2024-03-19 RX ADMIN — GABAPENTIN 600 MG: 300 CAPSULE ORAL at 20:14

## 2024-03-19 RX ADMIN — GABAPENTIN 600 MG: 300 CAPSULE ORAL at 08:28

## 2024-03-19 ASSESSMENT — PAIN - FUNCTIONAL ASSESSMENT
PAIN_FUNCTIONAL_ASSESSMENT: 0-10
PAIN_FUNCTIONAL_ASSESSMENT: ACTIVITIES ARE NOT PREVENTED

## 2024-03-19 ASSESSMENT — PAIN DESCRIPTION - LOCATION
LOCATION: GENERALIZED
LOCATION: BACK

## 2024-03-19 ASSESSMENT — PAIN DESCRIPTION - DESCRIPTORS
DESCRIPTORS: ACHING;DISCOMFORT
DESCRIPTORS: ACHING

## 2024-03-19 ASSESSMENT — PAIN DESCRIPTION - ORIENTATION: ORIENTATION: RIGHT;LEFT

## 2024-03-19 ASSESSMENT — PAIN SCALES - GENERAL
PAINLEVEL_OUTOF10: 3
PAINLEVEL_OUTOF10: 2

## 2024-03-19 NOTE — PLAN OF CARE
Problem: Discharge Planning  Goal: Discharge to home or other facility with appropriate resources  Outcome: Progressing  Flowsheets (Taken 3/19/2024 0827)  Discharge to home or other facility with appropriate resources: Identify barriers to discharge with patient and caregiver     Problem: Safety - Adult  Goal: Free from fall injury  Outcome: Progressing     Problem: ABCDS Injury Assessment  Goal: Absence of physical injury  Outcome: Progressing  Flowsheets (Taken 3/13/2024 7421 by Rupesh Vela, RN)  Absence of Physical Injury: Implement safety measures based on patient assessment     Problem: Pain  Goal: Verbalizes/displays adequate comfort level or baseline comfort level  Outcome: Progressing  Flowsheets  Taken 3/19/2024 0845  Verbalizes/displays adequate comfort level or baseline comfort level:   Assess pain using appropriate pain scale   Administer analgesics based on type and severity of pain and evaluate response   Implement non-pharmacological measures as appropriate and evaluate response  Taken 3/19/2024 0815  Verbalizes/displays adequate comfort level or baseline comfort level: Encourage patient to monitor pain and request assistance

## 2024-03-19 NOTE — PATIENT CARE CONFERENCE
Team Conference In Room Rounding Note     In room rounding was completed today with RN, PT/OT, LSW and ARU Supervisor present. LSW called patient's spouse,  Sheree, to allow them to be present and active during the conversation. Marko Bermudez was educated on their discharge date, 3/21/2024, and the recommendation for Home with home healthcare therapies and nursing after discharge. Physical Therapy informed the patient and family on their current assist level and plan of care. Occupation Therapy provided information to the patient and family on their current assist level for ADLs and the plan of care. The RN staff provided education on medication management, current co-morbidities that are being addressed by the physicians and the patient's plan of care for their stay.    The interdisciplinary team identified the following barriers to address prior to discharge:  Knee buckling during mobility, but this is decreased from prior to admission  Decreased endurance  Assistance needed with IADLs  Poor safety awareness with decreased consideration for energy conservation.    Education/recommendations to assist at discharge included:  Recommending increased supervision upon d/c to ensure his safety  Recommending having someone with him during walking intially  Recommending stay on the first floor for a while at d/c.  Recommending slowing down to increase his safety with mobilities and ADLs.  
requires continued physician supervision by a rehabilitation physician  [x] The patient requires an intensive and coordinated interdisciplinary team approach to the delivery of rehabilitative care    Medical Necessity-continued close physician medical management is required for:   [] Cardiac/Circulatory dysfunction  [] Respiratory/Pulmonary dysfunction  [] Integumentary complications  [] Peripheral Vascular dysfunction  [] Musculoskeletal dysfunction  [x] Neurological dysfunction d/t:  [] CVA  [x] SCI  [] TBI  [] Other: __________  [] Renal dysfunction  [] Hematologic dysfunction    [] Endocrine disorders  [] GI disorders     [] Genito-Urinary dysfunction    Assessment/Plan:  [x] The patient is making good progression towards their LTG's, is actively participating in, and has a reasonable expectation to continue to benefit from the intensive rehabilitation program.  [] The estimated discharge date has been changed from initial team conference due to:   [] The estimated discharge destination has been changed from initial team conference due to:     Rehab Team Members in attendance for Team Conference:  ARU Supervisor/PPS Coordinator:  Rene Gates PT, DPT    Therapy Manager/ARU :  Latasha Pizano PT, DPT    Nursing Manager:  Clarissa Armando, JOCE    Social Work:  Eduardo Mcgee    Nursing:  Vandana Aguirre, JOCE    Therapy:  Latasha Bear PT, DPT   MAHESH Martinez approve the established interdisciplinary plan of care as documented within the medical record of Marko Bermudez.    HAY Flores.O. M.P.H  PM&R  3/19/2024  11:41 AM

## 2024-03-19 NOTE — CARE COORDINATION
SW met with patient at bedside and called spouse to provide updates and give anticipated discharge date of 3/21. Patient was agreeable with anticipated discharge date and has no more questions at this time. Patient would like to discharge home with [facility] at discharge. SW following.      Electronically signed by MIKE Lawson on 3/19/2024 at 11:33 AM

## 2024-03-19 NOTE — PROGRESS NOTES
Physical Therapy  Facility/Department: [facility] ACUTE REHAB UNIT  Rehabilitation Physical Therapy Treatment Note    NAME: Marko Bermudez  : 1950 (73 y.o.)  MRN: [id number]  CODE STATUS: Full Code    Date of Service: 3/19/24       Restrictions:  Required Braces or Orthoses  Spinal: Thoracic Lumbar Sacral Orthotics  Position Activity Restriction  Spinal Precautions: No Bending, No Lifting, No Twisting  Other position/activity restrictions: up with assist, ambulate; full code; adult diet reg 3 carb; TLSO brace when OOB     SUBJECTIVE  Subjective  Subjective: Pt seated in  wc & agreeable to PT.  Pain: denies pain    OBJECTIVE  Cognition  Overall Cognitive Status: WNL  Orientation  Overall Orientation Status: Within Normal Limits    Functional Mobility  Transfers  Surface: From chair with arms;To chair with arms;Wheelchair (upright walker)  Sit to Stand  Assistance Level: Stand by assist;Contact guard assist  Skilled Clinical Factors: SBA with exception of CGA from upright walker. VC for safe technique with use of upright walker.  Stand to Sit  Assistance Level: Contact guard assist  Skilled Clinical Factors: VC hand placement and controlled eccentric lower. VC for safe technique with use of upright walker.      Environmental Mobility  Ambulation  Surface: Level surface  Device: Rolling walker (upright walker)  Distance: 200' + 450' (including ascent 70' ramp) + 375' (including descent of 70' ramp) + 75'  Activity Comments: Ataxic gait with variable B LE placement, NBOS, forward flexed posture, decreased B step height with occasional decreased heel strike; Several instances of minor L/R knee buckling on descent of ramp and with fatigue however corrects with CGA.  Assistance Level: Contact guard assist  Skilled Clinical Factors: VC for upright posture with hip extension and decreased WB through UEs on upright walker. VC for controlled pace mohamud with descent of ramp.  Stairs  Stair Height: 6''  Device: One

## 2024-03-19 NOTE — PROGRESS NOTES
Pt performed toilet transfer with the use of grab bars and RW. Pt able to doff/don brief and pant over hips in stance SBA. Pt ambulated to sink to complete handwashing in stance with RW SBA. Pt demo STS from different surface level heights in room. Pt demo bed mobility with HOB flat to simulate at home bed. Pt education on how to purchase bedrail AD on [employer]. Pt ambulated ~190 ft from room to supply room with upright walker. Pt completed functional reaching to retreive towels in cabinet above head. Pt demo 3 retractive steps and negotiated tight spaces with upright walker. Pt required a seated rest break. Pt ambulated back to room +~45ft with upright walker. Pt LOB 1x requiring Cecelia to adjust. Pt education on not making adjustments while ambulating. Pt performed standing pivot transfer from recliner to WC with SBA. Pt. will continue to benefit from skilled IP OT services to maximize INDependence for ADLs/IADLs, functional transfers/functional mobility. Continue OT POC.  Patient Education  Education  Education Given To: Patient  Education Provided: Role of Therapy;Plan of Care;Mobility Training;Transfer Training  Education Provided Comments: Therapist provided education to Pt. regarding awareness of slower andrew, slower movements to improve safety awareness with fucntional mobility, ADLs/IADL tasks. Pt. demonstrated understanding with slower gait from Therapy gym > room.  Education Method: Verbal  Barriers to Learning: None  Education Outcome: Verbalized understanding    Plan  Occupational Therapy Plan  Times Per Week: 90 minutes/day x 5 days/week  Times Per Day: Once a day  Current Treatment Recommendations: Balance training;Functional mobility training;Endurance training;Safety education & training;Patient/Caregiver education & training;Self-Care / ADL;Strengthening;Neuromuscular re-education;Equipment evaluation, education, & procurement;Home management training    Goals  Patient Goals   Patient goals : \"to

## 2024-03-19 NOTE — PROGRESS NOTES
Patient up in wheelchair at this time, participating in therapy without difficulty. Shift assessments completed, VSS. Patient alert & oriented x 4, denies pain/discomfort thus far this shift. Patient has estimated discharge date of Thursday 3/21/24. Dr. Phelps gave verbal order to stop AC&HS blood sugars at this time. Patient is recommended to follow up with PCP for further Diabetic management at discharge.Call light and personal items within reach, safety measures in place.

## 2024-03-19 NOTE — PROGRESS NOTES
Department of Physical Medicine & Rehabilitation  Progress Note    Patient Identification:  Marko Bermudez  [id number]  : 1950  Admit date: 3/13/2024    Chief Complaint: Thoracic myelopathy    Subjective:   Seen in therapy today. He says he is doing well and able to do a little more during therapy than yesterday. Denies any pain or new concerns. Says he sleeps very little at baseline, but this is unchanged since he is at the hospital.  Team conference today.    ROS: No f/c, n/v, cp     Objective:  Patient Vitals for the past 24 hrs:   BP Temp Temp src Pulse Resp SpO2 Weight   24 0815 (!) 148/73 97.8 °F (36.6 °C) Oral 86 18 95 % --   24 0345 -- -- -- -- -- -- 124 kg (273 lb 5.9 oz)   24 0115 -- -- -- -- 18 97 % --   24 120/65 98.2 °F (36.8 °C) Oral 77 16 97 % --       Const: Alert. No distress, pleasant.   HEENT: Normocephalic, atraumatic. Normal sclera/conjunctiva. MMM.   CV: Regular rate and rhythm.   Resp: No respiratory distress. Lungs CTAB.   Abd: Soft, nontender, nondistended, NABS+   Ext: No edema.   Neuro: Alert, oriented, appropriately interactive.   Psych: Cooperative, appropriate mood and affect    Laboratory data: Available via EMR.   Last 24 hour lab  Recent Results (from the past 24 hour(s))   POCT Glucose    Collection Time: 24 11:23 AM   Result Value Ref Range    POC Glucose 114 (H) 70 - 99 mg/dl    Performed on ACCU-CHEK    POCT Glucose    Collection Time: 24  4:58 PM   Result Value Ref Range    POC Glucose 129 (H) 70 - 99 mg/dl    Performed on ACCU-CHEK    POCT Glucose    Collection Time: 24  7:17 AM   Result Value Ref Range    POC Glucose 112 (H) 70 - 99 mg/dl    Performed on ACCU-CHEK          Therapy progress:  PT  Required Braces or Orthoses  Spinal: Thoracic Lumbar Sacral Orthotics  Position Activity Restriction  Spinal Precautions: No Bending, No Lifting, No Twisting  Other position/activity restrictions: up with assist, ambulate; full

## 2024-03-20 LAB
ANION GAP SERPL CALCULATED.3IONS-SCNC: 8 MMOL/L (ref 3–16)
BASOPHILS # BLD: 0 K/UL (ref 0–0.2)
BASOPHILS NFR BLD: 0.2 %
BUN SERPL-MCNC: 18 MG/DL (ref 7–20)
CALCIUM SERPL-MCNC: 8.9 MG/DL (ref 8.3–10.6)
CHLORIDE SERPL-SCNC: 104 MMOL/L (ref 99–110)
CO2 SERPL-SCNC: 25 MMOL/L (ref 21–32)
CREAT SERPL-MCNC: 1.1 MG/DL (ref 0.8–1.3)
DEPRECATED RDW RBC AUTO: 12.9 % (ref 12.4–15.4)
EOSINOPHIL # BLD: 0.1 K/UL (ref 0–0.6)
EOSINOPHIL NFR BLD: 1.6 %
GFR SERPLBLD CREATININE-BSD FMLA CKD-EPI: >60 ML/MIN/{1.73_M2}
GLUCOSE SERPL-MCNC: 104 MG/DL (ref 70–99)
HCT VFR BLD AUTO: 35.8 % (ref 40.5–52.5)
HGB BLD-MCNC: 12.3 G/DL (ref 13.5–17.5)
LYMPHOCYTES # BLD: 1.2 K/UL (ref 1–5.1)
LYMPHOCYTES NFR BLD: 15.9 %
MCH RBC QN AUTO: 34.6 PG (ref 26–34)
MCHC RBC AUTO-ENTMCNC: 34.3 G/DL (ref 31–36)
MCV RBC AUTO: 100.8 FL (ref 80–100)
MONOCYTES # BLD: 0.7 K/UL (ref 0–1.3)
MONOCYTES NFR BLD: 9 %
NEUTROPHILS # BLD: 5.4 K/UL (ref 1.7–7.7)
NEUTROPHILS NFR BLD: 73.3 %
PLATELET # BLD AUTO: 162 K/UL (ref 135–450)
PMV BLD AUTO: 9.3 FL (ref 5–10.5)
POTASSIUM SERPL-SCNC: 4.4 MMOL/L (ref 3.5–5.1)
RBC # BLD AUTO: 3.55 M/UL (ref 4.2–5.9)
SODIUM SERPL-SCNC: 137 MMOL/L (ref 136–145)
WBC # BLD AUTO: 7.4 K/UL (ref 4–11)

## 2024-03-20 PROCEDURE — 6360000002 HC RX W HCPCS: Performed by: PHYSICAL MEDICINE & REHABILITATION

## 2024-03-20 PROCEDURE — 97535 SELF CARE MNGMENT TRAINING: CPT

## 2024-03-20 PROCEDURE — 94660 CPAP INITIATION&MGMT: CPT

## 2024-03-20 PROCEDURE — 85025 COMPLETE CBC W/AUTO DIFF WBC: CPT

## 2024-03-20 PROCEDURE — 80048 BASIC METABOLIC PNL TOTAL CA: CPT

## 2024-03-20 PROCEDURE — 97116 GAIT TRAINING THERAPY: CPT

## 2024-03-20 PROCEDURE — 1280000000 HC REHAB R&B

## 2024-03-20 PROCEDURE — 36415 COLL VENOUS BLD VENIPUNCTURE: CPT

## 2024-03-20 PROCEDURE — 97530 THERAPEUTIC ACTIVITIES: CPT

## 2024-03-20 PROCEDURE — 94640 AIRWAY INHALATION TREATMENT: CPT

## 2024-03-20 PROCEDURE — 97110 THERAPEUTIC EXERCISES: CPT

## 2024-03-20 PROCEDURE — 6370000000 HC RX 637 (ALT 250 FOR IP): Performed by: PHYSICAL MEDICINE & REHABILITATION

## 2024-03-20 RX ORDER — OXYCODONE HYDROCHLORIDE 5 MG/1
5 TABLET ORAL EVERY 4 HOURS PRN
Qty: 20 TABLET | Refills: 0 | Status: SHIPPED | OUTPATIENT
Start: 2024-03-20 | End: 2024-03-23

## 2024-03-20 RX ORDER — ATORVASTATIN CALCIUM 40 MG/1
40 TABLET, FILM COATED ORAL DAILY
Qty: 30 TABLET | Refills: 3 | Status: SHIPPED | OUTPATIENT
Start: 2024-03-21

## 2024-03-20 RX ORDER — SENNA AND DOCUSATE SODIUM 50; 8.6 MG/1; MG/1
1 TABLET, FILM COATED ORAL 2 TIMES DAILY
Qty: 60 TABLET | Refills: 1 | Status: SHIPPED | OUTPATIENT
Start: 2024-03-20

## 2024-03-20 RX ORDER — DOXAZOSIN MESYLATE 4 MG/1
4 TABLET ORAL NIGHTLY
Qty: 30 TABLET | Refills: 3 | Status: SHIPPED | OUTPATIENT
Start: 2024-03-20

## 2024-03-20 RX ADMIN — TRAZODONE HYDROCHLORIDE 100 MG: 50 TABLET ORAL at 22:49

## 2024-03-20 RX ADMIN — GABAPENTIN 600 MG: 300 CAPSULE ORAL at 22:50

## 2024-03-20 RX ADMIN — AMLODIPINE BESYLATE 5 MG: 5 TABLET ORAL at 09:23

## 2024-03-20 RX ADMIN — GABAPENTIN 600 MG: 300 CAPSULE ORAL at 09:23

## 2024-03-20 RX ADMIN — ALLOPURINOL 300 MG: 300 TABLET ORAL at 09:24

## 2024-03-20 RX ADMIN — ATORVASTATIN CALCIUM 40 MG: 40 TABLET, FILM COATED ORAL at 09:24

## 2024-03-20 RX ADMIN — GABAPENTIN 600 MG: 300 CAPSULE ORAL at 18:34

## 2024-03-20 RX ADMIN — ACETAMINOPHEN 1000 MG: 500 TABLET ORAL at 18:35

## 2024-03-20 RX ADMIN — CARBIDOPA AND LEVODOPA 1 TABLET: 25; 250 TABLET ORAL at 22:54

## 2024-03-20 RX ADMIN — DOXAZOSIN 4 MG: 2 TABLET ORAL at 22:50

## 2024-03-20 RX ADMIN — ROPINIROLE HYDROCHLORIDE 3 MG: 1 TABLET, FILM COATED ORAL at 14:22

## 2024-03-20 RX ADMIN — PANTOPRAZOLE SODIUM 40 MG: 40 TABLET, DELAYED RELEASE ORAL at 05:45

## 2024-03-20 RX ADMIN — BUDESONIDE 500 MCG: 0.5 INHALANT RESPIRATORY (INHALATION) at 20:30

## 2024-03-20 RX ADMIN — ENOXAPARIN SODIUM 30 MG: 100 INJECTION SUBCUTANEOUS at 09:22

## 2024-03-20 RX ADMIN — GABAPENTIN 600 MG: 300 CAPSULE ORAL at 12:59

## 2024-03-20 RX ADMIN — ACETAMINOPHEN 1000 MG: 500 TABLET ORAL at 12:59

## 2024-03-20 RX ADMIN — ROPINIROLE HYDROCHLORIDE 3 MG: 1 TABLET, FILM COATED ORAL at 22:49

## 2024-03-20 RX ADMIN — ARFORMOTEROL TARTRATE 15 MCG: 15 SOLUTION RESPIRATORY (INHALATION) at 20:30

## 2024-03-20 RX ADMIN — ACETAMINOPHEN 1000 MG: 500 TABLET ORAL at 05:44

## 2024-03-20 RX ADMIN — ENOXAPARIN SODIUM 30 MG: 100 INJECTION SUBCUTANEOUS at 22:50

## 2024-03-20 RX ADMIN — LOSARTAN POTASSIUM 50 MG: 25 TABLET, FILM COATED ORAL at 09:23

## 2024-03-20 RX ADMIN — SPIRONOLACTONE 25 MG: 25 TABLET ORAL at 09:53

## 2024-03-20 RX ADMIN — ROPINIROLE HYDROCHLORIDE 3 MG: 1 TABLET, FILM COATED ORAL at 09:23

## 2024-03-20 RX ADMIN — VITAMIN B COMPLEX 1 CAPSULE: at 09:23

## 2024-03-20 ASSESSMENT — PAIN SCALES - GENERAL
PAINLEVEL_OUTOF10: 0
PAINLEVEL_OUTOF10: 0
PAINLEVEL_OUTOF10: 3
PAINLEVEL_OUTOF10: 0

## 2024-03-20 ASSESSMENT — PAIN DESCRIPTION - ORIENTATION: ORIENTATION: RIGHT;LEFT

## 2024-03-20 ASSESSMENT — PAIN DESCRIPTION - DESCRIPTORS: DESCRIPTORS: ACHING;DISCOMFORT

## 2024-03-20 ASSESSMENT — PAIN DESCRIPTION - LOCATION: LOCATION: BACK

## 2024-03-20 NOTE — PROGRESS NOTES
Pt Aox4. Vitals WNL. Denies pain this shift. Pt tolerated therapy very well and looks forward to discharging tomorrow. ..  Vitals:    03/20/24 0920   BP: 138/74   Pulse: 88   Resp: 16   Temp: 97.9 °F (36.6 °C)   SpO2: 95%

## 2024-03-20 NOTE — PROGRESS NOTES
Department of Physical Medicine & Rehabilitation  Progress Note    Patient Identification:  Marko Bermudez  [id number]  : 1950  Admit date: 3/13/2024    Chief Complaint: Thoracic myelopathy    Subjective:   Seen at bedside today. Says he did well in therapy today, working a little harder than yesterday. Denies any pain and reports that he slept better yesterday. Tolerating meals without issue. Reports he is highly motivated to continue therapy in order to return to his baseline if possible.    ROS: No f/c, n/v, cp     Objective:  Patient Vitals for the past 24 hrs:   BP Temp Temp src Pulse Resp SpO2 Weight   24 0920 138/74 97.9 °F (36.6 °C) Oral 88 16 95 % --   24 0614 -- -- -- 79 16 -- --   24 0544 -- -- -- 75 16 -- --   24 0253 -- -- -- -- 16 98 % --   24 0200 -- -- -- -- -- -- 124.5 kg (274 lb 7.6 oz)   24 -- -- -- 74 -- -- --   24 (!) 143/66 97.9 °F (36.6 °C) Oral 72 16 97 % --       Const: Alert. No distress, pleasant.   HEENT: Normocephalic, atraumatic. Normal sclera/conjunctiva. MMM.   CV: Regular rate and rhythm.   Resp: No respiratory distress. Lungs CTAB.   Abd: Soft, nontender, nondistended, NABS+   Ext: No edema.   Neuro: Alert, oriented, appropriately interactive.   Psych: Cooperative, appropriate mood and affect    Laboratory data: Available via EMR.   Last 24 hour lab  Recent Results (from the past 24 hour(s))   POCT Glucose    Collection Time: 24 11:47 AM   Result Value Ref Range    POC Glucose 126 (H) 70 - 99 mg/dl    Performed on ACCU-CHEK    Basic Metabolic Panel w/ Reflex to MG    Collection Time: 24  5:52 AM   Result Value Ref Range    Sodium 137 136 - 145 mmol/L    Potassium reflex Magnesium 4.4 3.5 - 5.1 mmol/L    Chloride 104 99 - 110 mmol/L    CO2 25 21 - 32 mmol/L    Anion Gap 8 3 - 16    Glucose 104 (H) 70 - 99 mg/dL    BUN 18 7 - 20 mg/dL    Creatinine 1.1 0.8 - 1.3 mg/dL    Est, Glom Filt Rate >60 >60    Calcium 8.9

## 2024-03-20 NOTE — PROGRESS NOTES
03/20/24 1510   Encounter Summary   Encounter Overview/Reason  Spiritual/Emotional Needs;Rituals, Rites and Sacraments   Service Provided For: Patient   Last Encounter  03/17/24   Complexity of Encounter Moderate   Begin Time 1452   End Time  1511   Total Time Calculated 19 min   Spiritual/Emotional needs   Type Spiritual Support   Rituals, Rites and Sacraments   Type Jewish Communion;Blessings   Assessment/Intervention/Outcome   Assessment Calm;Peaceful   Intervention Active listening;Discussed belief system/Yarsani practices/adelso;Explored/Affirmed feelings, thoughts, concerns;Prayer (assurance of)/Orient;Read/Provided Scripture   Outcome Comfort;Connection/Belonging;Encouraged;Engaged in conversation;Expressed feelings of Brigitte, Peace and/or Love;Expressed Gratitude;Optimistic;Peace;Receptive      Intern Wandy Mcginnis EdD, TERESE DEJESUS ([facility])

## 2024-03-20 NOTE — DISCHARGE INSTR - COC
Continuity of Care Form    Patient Name: Marko Bermudez   :  1950  MRN:  [id number]    Admit date:  3/13/2024  Discharge date:  3/21/2024      Code Status Order: Full Code   Advance Directives:     Admitting Physician:  Mac Phelps DO  PCP: Emmanuel Russo    Discharging Nurse: amy HOBSON    Discharging Hospital Unit/Room#: 3102/3102-01  Discharging Unit Phone Number: [phone]      Emergency Contact:   Extended Emergency Contact Information  Primary Emergency Contact: Sheree Bermudez  Address: [address] [place] [address] of [facility]  Home Phone: [phone]  Mobile Phone: [phone]  Relation: Spouse    Past Surgical History:  Past Surgical History:   Procedure Laterality Date    BACK SURGERY      three times    BACK SURGERY  may 2013    CARPAL TUNNEL RELEASE      CERVICAL FUSION      COLONOSCOPY      CYSTOSCOPY      HERNIA REPAIR      ventral    KNEE ARTHROPLASTY Left 13    KNEE ARTHROSCOPY      left    KNEE ARTHROSCOPY  [id number]    left    LAMINECTOMY N/A 3/9/2024    THORACIC 6 - THORACIC 12 EXTENSION OF PREVIOUS SPINAL FUSION TO THORACIC 4 WITH DECOMPRESSION, REMOVAL OF SPINAL CORD STIMULATOR performed by Danny Wilson MD at [facility] OR    NECK SURGERY      twice    OTHER SURGICAL HISTORY  2013    LEFT SHOULDER ARTHROSCOPY, DEBRIDEMENT, ROTATOR CUFF REPAIR,    ROTATOR CUFF REPAIR      SHOULDER ARTHROSCOPY Right 3/26/14    RIGHT SHOULDER ARTHROSCOPY ; ROTATOR CUFF REPAIR;    SHOULDER SURGERY      bilateral    SHOULDER SURGERY Right 05/10/2017    RIGHT SHOULDER ARTHROSCOPY WITH DEBRIDEMENT ROTATOR CUFF REPAIR    TOE SURGERY      implant right great toe       Immunization History:   Immunization History   Administered Date(s) Administered    COVID-19, PFIZER GRAY top, DO NOT Dilute, (age 12 y+), IM, 30 mcg/0.3 mL 05/10/2022    COVID-19, PFIZER PURPLE top, DILUTE for use, (age 12 y+), 30mcg/0.3mL 2021, 2021, 2021    COVID-19, PFIZER,

## 2024-03-20 NOTE — PROGRESS NOTES
each step in order to improve safety. NO LOB observed.  Sit to Stand  Assistance Level: Supervision;Stand by assist  Stand to Sit  Assistance Level: Supervision;Stand by assist  Stand Pivot  Assistance Level: Stand by assist;Supervision  Sit Pivot  Assistance Level: Supervision;Stand by assist         Assessment  Assessment  Assessment: Pt. agreeable and eager to participate in this 60 minute OT ADL Discharge Session. Pt. mobilized into bathroom with use of RW requiring SBA only for safety. Pt. completed all ADLs with SPV/SBA for safety. Pt. demonstrates strong safety awareness and adheres to spinal precautions without verbal cues. Pt. mobilize backto bedroom to perform dressing tasks to simulate how he would complete dressing at home, as he discharges to home on 3.21.24. Pt. reported NO more needs at EOS. Pt. left in wheelchair, as he is untethered. Pt. will contiue to benefit from Home Health OT to maximize functional INDependence for ADLs/IADLs, functional transfers/fucntional mobiltiy. Continue OT POC.  Activity Tolerance: Patient tolerated treatment well  Discharge Recommendations: Continue to assess pending progress;24 hour supervision or assist;Home with Home health OT  OT Equipment Recommendations  ADL Assistive Devices: Reacher  Other: defer to next level of care  Safety Devices  Safety Devices in place: Yes  Type of devices: Left in chair;Call light within reach;Chair alarm in place;Nurse notified    SECOND TREATMENT SESSION [[id number]]    Pt agreeable and eager to participate in this 30 minute session OT Session. Pt completed 10 total minutes on the SCIFIT to increase endurance and strength to maximize independence with ADLs and IADLs. Pt completed 9 minutes on resistance 3 and 1 minute on resistance 4. Pt ambulated ~50ft (CGA) with RW to demo household distance. Pt participated in neuromuscular balance activity to increase dynamic standing balance. Pt bounced yoga ball back and fourth for 5 minutes with

## 2024-03-20 NOTE — PLAN OF CARE
Problem: Discharge Planning  Goal: Discharge to home or other facility with appropriate resources  Outcome: Progressing  Discharge to home or other facility with appropriate resources: Identify barriers to discharge with patient and caregiver     Problem: Safety - Adult  Goal: Free from fall injury  Outcome: Progressing     Problem: ABCDS Injury Assessment  Goal: Absence of physical injury  Outcome: Progressing  Absence of Physical Injury: Implement safety measures based on patient assessment     Problem: Pain  Goal: Verbalizes/displays adequate comfort level or baseline comfort level  Outcome: Progressing  Flowsheets  Verbalizes/displays adequate comfort level or baseline comfort level:   Assess pain using appropriate pain scale   Administer analgesics based on type and severity of pain and evaluate response   Implement non-pharmacological measures as appropriate and evaluate response

## 2024-03-21 VITALS
WEIGHT: 274.69 LBS | DIASTOLIC BLOOD PRESSURE: 75 MMHG | OXYGEN SATURATION: 96 % | HEART RATE: 69 BPM | SYSTOLIC BLOOD PRESSURE: 138 MMHG | TEMPERATURE: 97.5 F | HEIGHT: 72 IN | RESPIRATION RATE: 16 BRPM | BODY MASS INDEX: 37.21 KG/M2

## 2024-03-21 PROCEDURE — 6370000000 HC RX 637 (ALT 250 FOR IP): Performed by: PHYSICAL MEDICINE & REHABILITATION

## 2024-03-21 PROCEDURE — 94660 CPAP INITIATION&MGMT: CPT

## 2024-03-21 PROCEDURE — 6360000002 HC RX W HCPCS: Performed by: PHYSICAL MEDICINE & REHABILITATION

## 2024-03-21 PROCEDURE — 94761 N-INVAS EAR/PLS OXIMETRY MLT: CPT

## 2024-03-21 PROCEDURE — 94640 AIRWAY INHALATION TREATMENT: CPT

## 2024-03-21 RX ADMIN — ENOXAPARIN SODIUM 30 MG: 100 INJECTION SUBCUTANEOUS at 09:21

## 2024-03-21 RX ADMIN — ATORVASTATIN CALCIUM 40 MG: 40 TABLET, FILM COATED ORAL at 09:20

## 2024-03-21 RX ADMIN — AMLODIPINE BESYLATE 5 MG: 5 TABLET ORAL at 09:21

## 2024-03-21 RX ADMIN — ROPINIROLE HYDROCHLORIDE 3 MG: 1 TABLET, FILM COATED ORAL at 09:20

## 2024-03-21 RX ADMIN — SPIRONOLACTONE 25 MG: 25 TABLET ORAL at 09:21

## 2024-03-21 RX ADMIN — ALLOPURINOL 300 MG: 300 TABLET ORAL at 09:20

## 2024-03-21 RX ADMIN — VITAMIN B COMPLEX 1 CAPSULE: at 09:21

## 2024-03-21 RX ADMIN — ARFORMOTEROL TARTRATE 15 MCG: 15 SOLUTION RESPIRATORY (INHALATION) at 10:37

## 2024-03-21 RX ADMIN — ACETAMINOPHEN 1000 MG: 500 TABLET ORAL at 06:51

## 2024-03-21 RX ADMIN — PANTOPRAZOLE SODIUM 40 MG: 40 TABLET, DELAYED RELEASE ORAL at 06:51

## 2024-03-21 RX ADMIN — GABAPENTIN 600 MG: 300 CAPSULE ORAL at 09:21

## 2024-03-21 RX ADMIN — LOSARTAN POTASSIUM 50 MG: 25 TABLET, FILM COATED ORAL at 09:20

## 2024-03-21 ASSESSMENT — PAIN SCALES - GENERAL: PAINLEVEL_OUTOF10: 0

## 2024-03-21 NOTE — CARE COORDINATION
[facility] hospital    Patient aware and agreeable to services. Faxed [facility] to [facility] access hospital for SOC by 3/23    Tresa Kelly LPN  Care Transition Nurse  [facility]  [phone]

## 2024-03-21 NOTE — PROGRESS NOTES
Pt in bed, awake. Alert & oriented x 3. VSS. Assessment completed. Nighttime medications given, pt refused the Senokot and Flonase spray. Pt advised if he is sleep at midnight do not wake him up for the Tylenol. Reminded pt to call for assistance with any needs. Call light within reach. Safety measures in place.

## 2024-03-21 NOTE — CARE COORDINATION
[facility]     LOC at discharge: Not Applicable  JENY Completed: Yes    Notification completed in HENS/PAS?:  Not Applicable    IMM Completed:   Yes, Case management has presented and reviewed IMM letter #2 to the patient and/or family/ POA. Patient and/or family/POA verbalized understanding of their medicare rights and appeal process if needed. Patient and/or family/POA has signed and placed today's date (3/20/24) and time (1500) on letter #2 on the the appropriate lines. Patient and/or family/POA, provided copy of signed letter and they are aware that this original copy of IMM letter #2 is available prior to discharge from the paper chart on the unit.  Electronic documentation has been entered into epic for IMM letter #2 and original paper copy has been added to the paper chart at the nurses station.          Transportation:  Transportation PLAN for discharge: family   Mode of Transport: Private Car    Home Care:  Home Care ordered at discharge: Yes  Home Care Agency: [facility]  Phone: [phone]  Fax: [phone]  Orders faxed: No Will pull from epic.     Referrals made at DISCHARGE for outpatient continued care:  Not Applicable    Additional CM Notes: Patient to [facility] home today [facility] and family to transport patient home. SW met with patient at bedside to discuss discharge plan. Patient has no more questions at this time and is content with the discharge plan.      COVID Result:  No results found for: \"COVID19\"    The Plan for Transition of Care is related to the following treatment goals of Thoracic myelopathy [M47.14]    The Patient and/or patient representative Marko and his family were provided with a choice of provider and agrees with the discharge plan Yes    Freedom of choice list was provided with basic dialogue that supports the patient's individualized plan of care/goals and shares the quality data associated with the providers. Yes    Care

## 2024-03-21 NOTE — PROGRESS NOTES
Discharge teaching provided to pt and wife, both stated they had no questions. Discharged through main lobby. No complications.

## 2024-03-21 NOTE — PROGRESS NOTES
Department of Physical Medicine & Rehabilitation  Progress Note    Patient Identification:  Marko Bermudez  [id number]  : 1950  Admit date: 3/13/2024    Chief Complaint: Thoracic myelopathy    Subjective:   Seen at bedside today. Denies nay pain or new concerns. Says he is doing well and excited to get home today.    ROS: No f/c, n/v, cp     Objective:  Patient Vitals for the past 24 hrs:   BP Temp Temp src Pulse Resp SpO2 Weight   24 0919 138/75 97.5 °F (36.4 °C) Oral 78 16 95 % --   24 0651 -- -- -- -- -- -- 124.6 kg (274 lb 11.1 oz)   24 0104 -- -- -- -- 18 95 % --   24 2230 130/65 97.6 °F (36.4 °C) Oral 66 18 96 % --   24 2030 -- -- -- -- 18 95 % --       Const: Alert. No distress, pleasant.   HEENT: Normocephalic, atraumatic. Normal sclera/conjunctiva. MMM.   CV: Regular rate and rhythm.   Resp: No respiratory distress. Lungs CTAB.   Abd: Soft, nontender, nondistended, NABS+   Ext: No edema.   Neuro: Alert, oriented, appropriately interactive.   Psych: Cooperative, appropriate mood and affect    Laboratory data: Available via EMR.   Last 24 hour lab  No results found for this or any previous visit (from the past 24 hour(s)).        Therapy progress:  PT  Required Braces or Orthoses  Spinal: Thoracic Lumbar Sacral Orthotics  Position Activity Restriction  Spinal Precautions: No Bending, No Lifting, No Twisting  Other position/activity restrictions: up with assist, ambulate; full code; adult diet reg 3 carb; TLSO brace when OOB  Objective     Sit to Stand: Minimal Assistance (at recliner/EOB/wc with RW)  Stand to Sit: Minimal Assistance  Device: Rolling Walker  Assistance: Minimal assistance (CGA-min)  Distance: 185'  OT  PT Equipment Recommendations  Equipment Needed: No  Other: owns RW and wc  Toilet - Technique: Ambulating  Equipment Used: Standard toilet  Toilet Transfers Comments: heavy reliance of GB leverage to stand  Assessment        SLP          Body mass index is

## 2024-03-21 NOTE — PLAN OF CARE
Problem: Discharge Planning  Goal: Discharge to home or other facility with appropriate resources  3/21/2024 0609 by Maye Sofia RN  Outcome: Adequate for Discharge  Flowsheets (Taken 3/21/2024 0609)  Discharge to home or other facility with appropriate resources:   Identify barriers to discharge with patient and caregiver   Identify discharge learning needs (meds, wound care, etc)     Problem: Safety - Adult  Goal: Free from fall injury  3/21/2024 0609 by Maye Sofia RN  Outcome: Adequate for Discharge  Flowsheets (Taken 3/21/2024 0609)  Free From Fall Injury: Instruct family/caregiver on patient safety  Note: Pt has been free from fall injury during admission on ARU.     Problem: ABCDS Injury Assessment  Goal: Absence of physical injury  3/21/2024 0609 by Maye Sofia RN  Outcome: Adequate for Discharge  Flowsheets (Taken 3/21/2024 0609)  Absence of Physical Injury: Implement safety measures based on patient assessment  Note: Pt has been absent of physical injury during admission on ARU.     Problem: Pain  Goal: Verbalizes/displays adequate comfort level or baseline comfort level  3/21/2024 0609 by Maye Sofia RN  Outcome: Adequate for Discharge  Flowsheets (Taken 3/21/2024 0609)  Verbalizes/displays adequate comfort level or baseline comfort level: Encourage patient to monitor pain and request assistance  Note: Pt has not complained of any pain during shift.

## 2024-03-21 NOTE — PLAN OF CARE
Problem: Discharge Planning  Goal: Discharge to home or other facility with appropriate resources  3/21/2024 1220 by Shadia Mullins, RN  Outcome: Completed  Flowsheets (Taken 3/21/2024 0919)  Discharge to home or other facility with appropriate resources: Identify barriers to discharge with patient and caregiver     Problem: Safety - Adult  Goal: Free from fall injury  3/21/2024 1220 by Shadia Mullins, RN  Outcome: Completed       Problem: ABCDS Injury Assessment  Goal: Absence of physical injury  3/21/2024 1220 by Shadia Mullins, RN  Outcome: Completed     Problem: Pain  Goal: Verbalizes/displays adequate comfort level or baseline comfort level  3/21/2024 1220 by Shadia Mullins, RN  Outcome: Completed

## 2024-03-21 NOTE — PROGRESS NOTES
Opioid [x] [x]   Antiplatelet [] []   Hypoglycemic (including insulin) [] []   None of the above []     Special Treatments, Procedures, and Programs (THROUGHOUT LAST 3 DAYS OF STAY)    Check all of the following treatments, procedures, and programs that apply at discharge. At Discharge (check all that apply)   Cancer Treatments   A1. Chemotherapy []           A2. IV []           A3. Oral []           A10. Other []   B1. Radiation []   Respiratory Therapies   C1. Oxygen Therapy []           C2. Continuous (continuously for at least 14 hours per day) []           C3. Intermittent [x]           C4. High-concentration []   D1. Suctioning (Does not include oral suctioning) []           D2. Scheduled []           D3. As needed []   E1. Tracheostomy Care []   F1. Invasive Mechanical Ventilator (ventilator or respirator) []   G1. Non-invasive Mechanical Ventilator []           G2. BiPAP []           G3. CPAP []   Other   H1. IV Medications (Do not include sub Q pumps, flushes, Dextrose 50% or lactated ringers) []           H2. Vasoactive medications []           H3. Antibiotics []           H4. Anticoagulation []           H10. Other []   I1. Transfusions []   J1. Dialysis []           J2. Hemodialysis []           J3. Peritoneal dialysis []   O1. IV access (including a catheter in a vein) []           O2. Peripheral []           O3. Midline []           O4. Central (PICC, tunneled, port) []      None of the above (select if no Cancer, Respiratory, or Other boxes are checked) []

## 2024-03-22 NOTE — DISCHARGE SUMMARY
Physical Medicine & Rehabilitation  Discharge Summary     Patient Identification:  Marko Bermudez  : 1950  Admit date: 3/13/2024  Discharge date: 3/21/2024  Attending provider: No att. providers found        Primary care provider: Emmanuel Russo     Discharge Diagnoses:   Patient Active Problem List   Diagnosis    Hypertension    Asthma    Gout    Sleep apnea    S/P shoulder surgery    S/p right shoulder surgery 06: arthroscopy, MUM, Neer, RCTR, Slap    Status post right rotator cuff surgery, arthroscopy, debridement, Neer and Biceps Tenotomy 3/26/2014    S/P left knee arthroscopy, chondroplasty, synovectomy, and partial lateral meniscectomy 11    Status post total left knee replacement using cement 2013    History of multiple low back surgeries by Dr. Augustin 2005, ,  and  And     Numbness and tingling of left leg    Left knee pain    Chronic right shoulder pain    Status post neck surgery by Dr. Augustin    S/P right rotator cuff repair, arthroscopy and debridement 5/10/2017    Ambulatory dysfunction    Thoracic myelopathy       Discharge Functional Status:      Physical therapy:  Supine to Sit: Partial/moderate assistance  Sit to Supine: Supervision or touching assistance      Sit to Stand: Partial/moderate assistance  Chair/Bed to Chair Transfer: Partial/moderate assistance  Car Transfer: Partial/moderate assistance     Ambulation 10 ft: Partial/moderate assistance  Ambulation 50 ft: Partial/moderate assistance  Ambulation 150 ft: Partial/moderate assistance  Stairs - 1 Step: Partial/moderate assistance  Stairs - 4 Step: Partial/moderate assistance  Stairs - 12 Step:      Occupational therapy:  Eating: Independent  Oral Hygiene: Independent  Bathing: Independent  Upper Body Dressing: Independent  Lower Body Dressing: Supervision or touching assistance     Toilet Transfer: Supervision or touching assistance  Toilet Hygiene: Supervision or touching

## 2025-01-03 NOTE — PROGRESS NOTES
Diet, NPO with Tube Feed:   Tube Feeding Modality: Gastrostomy  Jevity 1.5 Seng  Total Volume for 24 Hours (mL): 1440  Continuous  Starting Tube Feed Rate {mL per Hour}: 10  Increase Tube Feed Rate by (mL): 10     Every 4 hours  Until Goal Tube Feed Rate (mL per Hour): 60  Tube Feed Duration (in Hours): 24  Tube Feed Start Time: 00:00  Bolus   Total Volume per Flush (mL): 250   Frequency: Every 6 Hours  Prosource Gelatein Plus     Qty per Day:  2  Supplement Feeding Modality:  Gastrostomy  Ensure Enlive Cans or Servings Per Day:  1       Frequency:  Three Times a day (01-02-25 @ 18:02)   required      Environmental Mobility  Ambulation  Surface: Level surface  Device: Rolling walker (upright walker)  Distance: >500' x 2 (including ascent/descent  70' ramp) + 200' + small distances in gym  Activity Comments: Ataxic gait with variable B LE placement, NBOS, forward flexed posture, decreased B step height with occasional decreased heel strike; Several instances of minor L/R knee buckling on descent of ramp and with fatigue however corrects with CGA.  Assistance Level: Contact guard assist  Skilled Clinical Factors: VC for upright posture with hip extension and decreased WB through UEs on upright walker. VC for controlled pace mohamud with descent of ramp.  Stairs  Stair Height: 6''  Device: One handrail  Number of Stairs: 12  Additional Factors: Verbal cues  Assistance Level: Contact guard assist  Skilled Clinical Factors: B UEs on L HR with forwards ascent/ lateral descent  Skilled Clinical Factors - Comments: cue for upright posture, step clearance on ascent and controlled lower on decent      PT Exercises  Exercise Treatment: x10 BLE  SLR with VC/TC for technique. Pt completes x 3 bridges however limited by L LE hamstring cramp. Then completes x 60 seconds AAROM hamstring stretch with gait belt in supine and reports decreased strength post completion    Second Session:   Pt seated in wc upon approach and agreeable to PT. Pt completes sit<>stands at wc/EOB with mod I and RW. Completes 200' with RW with CGA with same gait mechanics/cues as above. While in gym pt completes  ~ 2 min bean bag toss activity including reaching outside of DHRUV and crossbody with no UE support (outside of 1 instance UE righting reaction on RW) with CGA throughout. Pt picks hernandez bags off ground with reacher UL UE support on RW and SBA. Upon completion of session pt left seated in chair in gym working with OT.     Third Session:   Pt seated in recliner upon approach and agreeable to PT. Pt completes sit<>stands at wc with RW and mod

## (undated) DEVICE — SOLUTION IV 1000ML 0.9% SOD CHL

## (undated) DEVICE — ELECTROSURGICAL PENCIL ROCKER SWITCH NON COATED BLADE ELECTRODE 10 FT (3 M) CORD HOLSTER: Brand: MEGADYNE

## (undated) DEVICE — SUTURE STRATAFIX SYMMETRIC PDS + SZ 1 L18IN ABSRB VLT L48MM SXPP1A400

## (undated) DEVICE — TOWEL,STOP FLAG GOLD N-W: Brand: MEDLINE

## (undated) DEVICE — MARKER SURG PASS SPHR NDI

## (undated) DEVICE — COVER LT HNDL CAM BLU DISP W/ SURG CTRL

## (undated) DEVICE — CRADLE ARM INDIV PT CARE KT COMP FOR FOR RADLUC WILSON FRME

## (undated) DEVICE — SUTURE MCRYL + SZ 4-0 L27IN ABSRB UD L19MM PS-2 3/8 CIR MCP426H

## (undated) DEVICE — GLOVE SURG SZ 75 L12IN FNGR THK94MIL TRNSLUC YEL LTX

## (undated) DEVICE — AGENT HEMOSTATIC SURGIFLOW MATRIX KIT W/THROMBIN

## (undated) DEVICE — 3M™ TEGADERM™ TRANSPARENT FILM DRESSING FRAME STYLE, 1626W, 4 IN X 4-3/4 IN (10 CM X 12 CM), 50/CT 4CT/CASE: Brand: 3M™ TEGADERM™

## (undated) DEVICE — LAMINECTOMY: Brand: MEDLINE INDUSTRIES, INC.

## (undated) DEVICE — BLANKET WRM W29.9XL79.1IN UP BODY FORC AIR MISTRAL-AIR

## (undated) DEVICE — PACK,UNIVERSAL,NO GOWNS: Brand: MEDLINE

## (undated) DEVICE — CONTAINER,SPECIMEN,PNEU TUBE,3OZ,OR STRL: Brand: MEDLINE

## (undated) DEVICE — ORTHO PRE OP PACK: Brand: MEDLINE INDUSTRIES, INC.

## (undated) DEVICE — PAD,NON-ADHERENT,3X8,STERILE,LF,1/PK: Brand: MEDLINE

## (undated) DEVICE — BIPOLAR SEALER 23-112-1 AQM 6.0: Brand: AQUAMANTYS ®

## (undated) DEVICE — SUTURE VCRL + SZ 2-0 L18IN ABSRB UD CT1 L36MM 1/2 CIR VCP839D

## (undated) DEVICE — SEALANT SURG 13 YR DURA AUTOSPRAY ADHERUS NUS109] SURGICAL ONE]

## (undated) DEVICE — 3M™ TEGADERM™ CHG CHLORHEXIDINE GLUCONATE GEL PAD 1664, 25 EACH/CARTON, 4 CARTONS/CASE: Brand: 3M™ TEGADERM™

## (undated) DEVICE — 3M™ TEGADERM™ TRANSPARENT FILM DRESSING FRAME STYLE, 1627, 4 IN X 10 IN (10 CM X 25 CM), 20/CT 4CT/CASE: Brand: 3M™ TEGADERM™

## (undated) DEVICE — UNDERGLOVE SURG SZ 8 BLU LTX FREE SYN POLYISOPRENE POLYMER

## (undated) DEVICE — CODMAN® SURGICAL PATTIES 1/2" X 1/2" (1.27CM X 1.27CM): Brand: CODMAN®

## (undated) DEVICE — SUTURE NRLN SZ 4-0 L18IN NONABSORBABLE BLK L13MM TF 1/2 CIR C584D

## (undated) DEVICE — C-ARMOR C-ARM EQUIPMENT COVERS CLEAR STERILE UNIVERSAL FIT 12 PER CASE: Brand: C-ARMOR

## (undated) DEVICE — SUTURE VCRL + SZ 0 L18IN ABSRB UD L36MM CT-1 1/2 CIR VCP840D

## (undated) DEVICE — NEURO SPONGES: Brand: DEROYAL

## (undated) DEVICE — DRAPE MICSCP W54XL150IN W/ 4 BINOC GLS LENS LEICA

## (undated) DEVICE — KIT EVAC 0.13IN RECT TB DIA10FR 400CC PVC 3 SPR Y CONN DRN

## (undated) DEVICE — TOOL MR8-14MH30 MR8 14CM MATCH 3MM: Brand: MIDAS REX MR8

## (undated) DEVICE — PROBE 8225101 5PK STD PRASS FL TIP ROHS

## (undated) DEVICE — STAPLER SKIN H3.9MM WIRE DIA0.58MM CRWN 6.9MM 35 STPL ROT